# Patient Record
Sex: FEMALE | Race: WHITE | Employment: OTHER | URBAN - METROPOLITAN AREA
[De-identification: names, ages, dates, MRNs, and addresses within clinical notes are randomized per-mention and may not be internally consistent; named-entity substitution may affect disease eponyms.]

---

## 2018-06-12 ENCOUNTER — HOSPITAL ENCOUNTER (OUTPATIENT)
Age: 73
Discharge: HOME | End: 2018-06-12
Payer: MEDICARE

## 2018-06-12 LAB
ANION GAP SERPL CALCULATED.3IONS-SCNC: 12 MMOL/L (ref 7–16)
BUN BLDV-MCNC: 31 MG/DL (ref 8–23)
CALCIUM SERPL-MCNC: 9.6 MG/DL (ref 8.6–10.2)
CHLORIDE BLD-SCNC: 100 MMOL/L (ref 98–107)
CO2: 26 MMOL/L (ref 22–29)
CREAT SERPL-MCNC: 1.5 MG/DL (ref 0.5–1)
CREATININE URINE: 231 MG/DL (ref 29–226)
FERRITIN: 59 NG/ML
GFR AFRICAN AMERICAN: 41
GFR NON-AFRICAN AMERICAN: 34 ML/MIN/1.73
GLUCOSE BLD-MCNC: 206 MG/DL (ref 74–109)
HCT VFR BLD CALC: 40.1 % (ref 34–48)
HEMOGLOBIN: 13.1 G/DL (ref 11.5–15.5)
IMMATURE RETIC FRACT: 11.3 % (ref 3–15.9)
IRON SATURATION: 40 % (ref 15–50)
IRON: 159 MCG/DL (ref 37–145)
MAGNESIUM: 1.5 MG/DL (ref 1.6–2.6)
MCH RBC QN AUTO: 29.2 PG (ref 26–35)
MCHC RBC AUTO-ENTMCNC: 32.7 % (ref 32–34.5)
MCV RBC AUTO: 89.3 FL (ref 80–99.9)
MICROALBUMIN UR-MCNC: <12 MG/L
MICROALBUMIN/CREAT UR-RTO: ABNORMAL (ref 0–30)
PARATHYROID HORMONE INTACT: 14 PG/ML (ref 15–65)
PDW BLD-RTO: 12.4 FL (ref 11.5–15)
PHOSPHORUS: 3 MG/DL (ref 2.5–4.5)
PLATELET # BLD: 319 E9/L (ref 130–450)
PMV BLD AUTO: 10 FL (ref 7–12)
POTASSIUM SERPL-SCNC: 4.4 MMOL/L (ref 3.5–5)
RBC # BLD: 4.49 E12/L (ref 3.5–5.5)
RETIC HGB EQUIVALENT: 36.3 PG (ref 28.2–36.6)
RETICULOCYTE ABSOLUTE COUNT: 0.08 E12/L
RETICULOCYTE COUNT PCT: 1.8 % (ref 0.4–1.9)
SODIUM BLD-SCNC: 138 MMOL/L (ref 132–146)
TOTAL IRON BINDING CAPACITY: 394 MCG/DL (ref 250–450)
URIC ACID, SERUM: 6.2 MG/DL (ref 2.4–5.7)
VITAMIN D 25-HYDROXY: 41 NG/ML (ref 30–100)
WBC # BLD: 8.4 E9/L (ref 4.5–11.5)

## 2018-06-12 PROCEDURE — 82570 ASSAY OF URINE CREATININE: CPT

## 2018-06-12 PROCEDURE — 83970 ASSAY OF PARATHORMONE: CPT

## 2018-06-12 PROCEDURE — 85027 COMPLETE CBC AUTOMATED: CPT

## 2018-06-12 PROCEDURE — 80048 BASIC METABOLIC PNL TOTAL CA: CPT

## 2018-06-12 PROCEDURE — 82044 UR ALBUMIN SEMIQUANTITATIVE: CPT

## 2018-06-12 PROCEDURE — 82306 VITAMIN D 25 HYDROXY: CPT

## 2018-06-12 PROCEDURE — 83735 ASSAY OF MAGNESIUM: CPT

## 2018-06-12 PROCEDURE — 36415 COLL VENOUS BLD VENIPUNCTURE: CPT

## 2018-06-12 PROCEDURE — 83550 IRON BINDING TEST: CPT

## 2018-06-12 PROCEDURE — 85045 AUTOMATED RETICULOCYTE COUNT: CPT

## 2018-06-12 PROCEDURE — 84550 ASSAY OF BLOOD/URIC ACID: CPT

## 2018-06-12 PROCEDURE — 82728 ASSAY OF FERRITIN: CPT

## 2018-06-12 PROCEDURE — 84100 ASSAY OF PHOSPHORUS: CPT

## 2018-06-12 PROCEDURE — 83540 ASSAY OF IRON: CPT

## 2018-11-15 ENCOUNTER — HOSPITAL ENCOUNTER (OUTPATIENT)
Age: 73
Discharge: HOME | End: 2018-11-15
Payer: MEDICARE

## 2018-11-15 LAB
ANION GAP SERPL CALCULATED.3IONS-SCNC: 11 MMOL/L (ref 7–16)
BUN BLDV-MCNC: 26 MG/DL (ref 8–23)
CALCIUM SERPL-MCNC: 9.5 MG/DL (ref 8.6–10.2)
CHLORIDE BLD-SCNC: 103 MMOL/L (ref 98–107)
CO2: 27 MMOL/L (ref 22–29)
CREAT SERPL-MCNC: 1.5 MG/DL (ref 0.5–1)
CREATININE URINE: 105 MG/DL (ref 29–226)
FERRITIN: 85 NG/ML
GFR AFRICAN AMERICAN: 41
GFR NON-AFRICAN AMERICAN: 34 ML/MIN/1.73
GLUCOSE BLD-MCNC: 91 MG/DL (ref 74–99)
HCT VFR BLD CALC: 37.7 % (ref 34–48)
HEMOGLOBIN: 12.5 G/DL (ref 11.5–15.5)
IMMATURE RETIC FRACT: 6.8 % (ref 3–15.9)
IRON SATURATION: 30 % (ref 15–50)
IRON: 103 MCG/DL (ref 37–145)
MAGNESIUM: 1.7 MG/DL (ref 1.6–2.6)
MCH RBC QN AUTO: 29.8 PG (ref 26–35)
MCHC RBC AUTO-ENTMCNC: 33.2 % (ref 32–34.5)
MCV RBC AUTO: 90 FL (ref 80–99.9)
MICROALBUMIN UR-MCNC: 32.6 MG/L
MICROALBUMIN/CREAT UR-RTO: 31 (ref 0–30)
PARATHYROID HORMONE INTACT: 27 PG/ML (ref 15–65)
PDW BLD-RTO: 12.2 FL (ref 11.5–15)
PHOSPHORUS: 3 MG/DL (ref 2.5–4.5)
PLATELET # BLD: 319 E9/L (ref 130–450)
PMV BLD AUTO: 10.2 FL (ref 7–12)
POTASSIUM SERPL-SCNC: 4.6 MMOL/L (ref 3.5–5)
RBC # BLD: 4.19 E12/L (ref 3.5–5.5)
RETIC HGB EQUIVALENT: 35.2 PG (ref 28.2–36.6)
RETICULOCYTE ABSOLUTE COUNT: 0.08 E12/L
RETICULOCYTE COUNT PCT: 1.9 % (ref 0.4–1.9)
SODIUM BLD-SCNC: 141 MMOL/L (ref 132–146)
TOTAL IRON BINDING CAPACITY: 344 MCG/DL (ref 250–450)
URIC ACID, SERUM: 5.7 MG/DL (ref 2.4–5.7)
VITAMIN D 25-HYDROXY: 56 NG/ML (ref 30–100)
WBC # BLD: 8.3 E9/L (ref 4.5–11.5)

## 2018-11-15 PROCEDURE — 83970 ASSAY OF PARATHORMONE: CPT

## 2018-11-15 PROCEDURE — 83550 IRON BINDING TEST: CPT

## 2018-11-15 PROCEDURE — 83540 ASSAY OF IRON: CPT

## 2018-11-15 PROCEDURE — 84100 ASSAY OF PHOSPHORUS: CPT

## 2018-11-15 PROCEDURE — 83735 ASSAY OF MAGNESIUM: CPT

## 2018-11-15 PROCEDURE — 82044 UR ALBUMIN SEMIQUANTITATIVE: CPT

## 2018-11-15 PROCEDURE — 84550 ASSAY OF BLOOD/URIC ACID: CPT

## 2018-11-15 PROCEDURE — 82570 ASSAY OF URINE CREATININE: CPT

## 2018-11-15 PROCEDURE — 80048 BASIC METABOLIC PNL TOTAL CA: CPT

## 2018-11-15 PROCEDURE — 82306 VITAMIN D 25 HYDROXY: CPT

## 2018-11-15 PROCEDURE — 85027 COMPLETE CBC AUTOMATED: CPT

## 2018-11-15 PROCEDURE — 82728 ASSAY OF FERRITIN: CPT

## 2018-11-15 PROCEDURE — 36415 COLL VENOUS BLD VENIPUNCTURE: CPT

## 2018-11-15 PROCEDURE — 85045 AUTOMATED RETICULOCYTE COUNT: CPT

## 2019-06-05 ENCOUNTER — HOSPITAL ENCOUNTER (OUTPATIENT)
Dept: MAMMOGRAPHY | Age: 74
Discharge: HOME | End: 2019-06-07
Payer: MEDICARE

## 2019-06-05 DIAGNOSIS — Z12.39 BREAST CANCER SCREENING: ICD-10-CM

## 2019-06-05 PROCEDURE — 77063 BREAST TOMOSYNTHESIS BI: CPT

## 2019-06-13 ENCOUNTER — HOSPITAL ENCOUNTER (OUTPATIENT)
Age: 74
Discharge: HOME | End: 2019-06-13
Payer: MEDICARE

## 2019-06-13 LAB
ANION GAP SERPL CALCULATED.3IONS-SCNC: 13 MMOL/L (ref 7–16)
BACTERIA: ABNORMAL /HPF
BILIRUBIN URINE: NEGATIVE
BLOOD, URINE: NEGATIVE
BUN BLDV-MCNC: 30 MG/DL (ref 8–23)
CALCIUM SERPL-MCNC: 9.4 MG/DL (ref 8.6–10.2)
CHLORIDE BLD-SCNC: 104 MMOL/L (ref 98–107)
CLARITY: ABNORMAL
CO2: 25 MMOL/L (ref 22–29)
COLOR: YELLOW
CREAT SERPL-MCNC: 1.7 MG/DL (ref 0.5–1)
CREATININE URINE: 228 MG/DL (ref 29–226)
EPITHELIAL CELLS, UA: ABNORMAL /HPF
FERRITIN: 158 NG/ML
GFR AFRICAN AMERICAN: 36
GFR NON-AFRICAN AMERICAN: 30 ML/MIN/1.73
GLUCOSE BLD-MCNC: 111 MG/DL (ref 74–99)
GLUCOSE URINE: NEGATIVE MG/DL
HCT VFR BLD CALC: 39.8 % (ref 34–48)
HEMOGLOBIN: 12.8 G/DL (ref 11.5–15.5)
IMMATURE RETIC FRACT: 10.4 % (ref 3–15.9)
IRON SATURATION: 29 % (ref 15–50)
IRON: 108 MCG/DL (ref 37–145)
KETONES, URINE: NEGATIVE MG/DL
LEUKOCYTE ESTERASE, URINE: ABNORMAL
MAGNESIUM: 1.7 MG/DL (ref 1.6–2.6)
MCH RBC QN AUTO: 30 PG (ref 26–35)
MCHC RBC AUTO-ENTMCNC: 32.2 % (ref 32–34.5)
MCV RBC AUTO: 93.2 FL (ref 80–99.9)
MICROALBUMIN UR-MCNC: 78.3 MG/L
MICROALBUMIN/CREAT UR-RTO: 34.3 (ref 0–30)
NITRITE, URINE: POSITIVE
PARATHYROID HORMONE INTACT: 25 PG/ML (ref 15–65)
PDW BLD-RTO: 12.4 FL (ref 11.5–15)
PH UA: 5.5 (ref 5–9)
PHOSPHORUS: 3.8 MG/DL (ref 2.5–4.5)
PLATELET # BLD: 321 E9/L (ref 130–450)
PMV BLD AUTO: 10.6 FL (ref 7–12)
POTASSIUM SERPL-SCNC: 4.5 MMOL/L (ref 3.5–5)
PROTEIN UA: ABNORMAL MG/DL
RBC # BLD: 4.27 E12/L (ref 3.5–5.5)
RBC UA: ABNORMAL /HPF (ref 0–2)
RETIC HGB EQUIVALENT: 34.3 PG (ref 28.2–36.6)
RETICULOCYTE ABSOLUTE COUNT: 0.09 E12/L
RETICULOCYTE COUNT PCT: 2.2 % (ref 0.4–1.9)
SODIUM BLD-SCNC: 142 MMOL/L (ref 132–146)
SPECIFIC GRAVITY UA: >=1.03 (ref 1–1.03)
TOTAL IRON BINDING CAPACITY: 373 MCG/DL (ref 250–450)
URIC ACID, SERUM: 5.5 MG/DL (ref 2.4–5.7)
UROBILINOGEN, URINE: 0.2 E.U./DL
VITAMIN D 25-HYDROXY: 46 NG/ML (ref 30–100)
WBC # BLD: 9.2 E9/L (ref 4.5–11.5)
WBC UA: ABNORMAL /HPF (ref 0–5)

## 2019-06-13 PROCEDURE — 83540 ASSAY OF IRON: CPT

## 2019-06-13 PROCEDURE — 80048 BASIC METABOLIC PNL TOTAL CA: CPT

## 2019-06-13 PROCEDURE — 83735 ASSAY OF MAGNESIUM: CPT

## 2019-06-13 PROCEDURE — 85045 AUTOMATED RETICULOCYTE COUNT: CPT

## 2019-06-13 PROCEDURE — 82044 UR ALBUMIN SEMIQUANTITATIVE: CPT

## 2019-06-13 PROCEDURE — 83550 IRON BINDING TEST: CPT

## 2019-06-13 PROCEDURE — 85027 COMPLETE CBC AUTOMATED: CPT

## 2019-06-13 PROCEDURE — 82570 ASSAY OF URINE CREATININE: CPT

## 2019-06-13 PROCEDURE — 81001 URINALYSIS AUTO W/SCOPE: CPT

## 2019-06-13 PROCEDURE — 82306 VITAMIN D 25 HYDROXY: CPT

## 2019-06-13 PROCEDURE — 36415 COLL VENOUS BLD VENIPUNCTURE: CPT

## 2019-06-13 PROCEDURE — 84550 ASSAY OF BLOOD/URIC ACID: CPT

## 2019-06-13 PROCEDURE — 84100 ASSAY OF PHOSPHORUS: CPT

## 2019-06-13 PROCEDURE — 82728 ASSAY OF FERRITIN: CPT

## 2019-06-13 PROCEDURE — 83970 ASSAY OF PARATHORMONE: CPT

## 2019-06-19 ENCOUNTER — HOSPITAL ENCOUNTER (OUTPATIENT)
Dept: ULTRASOUND IMAGING | Age: 74
Discharge: HOME | End: 2019-06-19
Payer: MEDICARE

## 2019-06-19 DIAGNOSIS — N18.30 CHRONIC KIDNEY DISEASE, STAGE III (MODERATE) (HCC): ICD-10-CM

## 2019-06-19 PROCEDURE — 76775 US EXAM ABDO BACK WALL LIM: CPT

## 2019-11-01 ENCOUNTER — HOSPITAL ENCOUNTER (OUTPATIENT)
Age: 74
Discharge: HOME | End: 2019-11-01
Payer: MEDICARE

## 2019-11-01 LAB
ALBUMIN SERPL-MCNC: 4.6 G/DL (ref 3.5–5.2)
ALP BLD-CCNC: 47 U/L (ref 35–104)
ALT SERPL-CCNC: 22 U/L (ref 0–32)
ANION GAP SERPL CALCULATED.3IONS-SCNC: 10 MMOL/L (ref 7–16)
AST SERPL-CCNC: 24 U/L (ref 0–31)
BACTERIA: ABNORMAL /HPF
BASOPHILS ABSOLUTE: 0.13 E9/L (ref 0–0.2)
BASOPHILS RELATIVE PERCENT: 1.6 % (ref 0–2)
BILIRUB SERPL-MCNC: 0.4 MG/DL (ref 0–1.2)
BILIRUBIN URINE: NEGATIVE
BLOOD, URINE: NEGATIVE
BUN BLDV-MCNC: 28 MG/DL (ref 8–23)
CALCIUM SERPL-MCNC: 9.7 MG/DL (ref 8.6–10.2)
CHLORIDE BLD-SCNC: 104 MMOL/L (ref 98–107)
CHOLESTEROL, FASTING: 185 MG/DL (ref 0–199)
CLARITY: CLEAR
CO2: 28 MMOL/L (ref 22–29)
COLOR: YELLOW
CREAT SERPL-MCNC: 1.4 MG/DL (ref 0.5–1)
CREATININE URINE: 102 MG/DL (ref 29–226)
EOSINOPHILS ABSOLUTE: 0.28 E9/L (ref 0.05–0.5)
EOSINOPHILS RELATIVE PERCENT: 3.6 % (ref 0–6)
EPITHELIAL CELLS, UA: ABNORMAL /HPF
FERRITIN: 243 NG/ML
GFR AFRICAN AMERICAN: 45
GFR NON-AFRICAN AMERICAN: 37 ML/MIN/1.73
GLUCOSE FASTING: 133 MG/DL (ref 74–99)
GLUCOSE URINE: NEGATIVE MG/DL
HBA1C MFR BLD: 7.9 % (ref 4–5.6)
HCT VFR BLD CALC: 38.2 % (ref 34–48)
HCT VFR BLD CALC: 38.6 % (ref 34–48)
HDLC SERPL-MCNC: 33 MG/DL
HEMOGLOBIN: 12.3 G/DL (ref 11.5–15.5)
IMMATURE GRANULOCYTES #: 0.2 E9/L
IMMATURE GRANULOCYTES %: 2.5 % (ref 0–5)
IMMATURE RETIC FRACT: 11.7 % (ref 3–15.9)
IRON SATURATION: 29 % (ref 15–50)
IRON: 118 MCG/DL (ref 37–145)
KETONES, URINE: NEGATIVE MG/DL
LDL CHOLESTEROL CALCULATED: 97 MG/DL (ref 0–99)
LEUKOCYTE ESTERASE, URINE: ABNORMAL
LYMPHOCYTES ABSOLUTE: 1.62 E9/L (ref 1.5–4)
LYMPHOCYTES RELATIVE PERCENT: 20.6 % (ref 20–42)
MAGNESIUM: 1.7 MG/DL (ref 1.6–2.6)
MCH RBC QN AUTO: 31.1 PG (ref 26–35)
MCHC RBC AUTO-ENTMCNC: 32.2 % (ref 32–34.5)
MCV RBC AUTO: 96.7 FL (ref 80–99.9)
MICROALBUMIN UR-MCNC: 98.8 MG/L
MICROALBUMIN/CREAT UR-RTO: 96.9 (ref 0–30)
MONOCYTES ABSOLUTE: 0.57 E9/L (ref 0.1–0.95)
MONOCYTES RELATIVE PERCENT: 7.2 % (ref 2–12)
NEUTROPHILS ABSOLUTE: 5.08 E9/L (ref 1.8–7.3)
NEUTROPHILS RELATIVE PERCENT: 64.5 % (ref 43–80)
NITRITE, URINE: NEGATIVE
PARATHYROID HORMONE INTACT: 15 PG/ML (ref 15–65)
PDW BLD-RTO: 11.9 FL (ref 11.5–15)
PH UA: 5 (ref 5–9)
PHOSPHORUS: 2.5 MG/DL (ref 2.5–4.5)
PLATELET # BLD: 344 E9/L (ref 130–450)
PMV BLD AUTO: 10.3 FL (ref 7–12)
POTASSIUM SERPL-SCNC: 4.5 MMOL/L (ref 3.5–5)
PROTEIN UA: ABNORMAL MG/DL
RBC # BLD: 3.95 E12/L (ref 3.5–5.5)
RBC UA: ABNORMAL /HPF (ref 0–2)
RETIC HGB EQUIVALENT: 35 PG (ref 28.2–36.6)
RETICULOCYTE ABSOLUTE COUNT: 0.08 E12/L
RETICULOCYTE COUNT PCT: 2.1 % (ref 0.4–1.9)
SODIUM BLD-SCNC: 142 MMOL/L (ref 132–146)
SPECIFIC GRAVITY UA: 1.02 (ref 1–1.03)
T4 FREE: 1.11 NG/DL (ref 0.93–1.7)
TOTAL IRON BINDING CAPACITY: 406 MCG/DL (ref 250–450)
TOTAL PROTEIN: 7.8 G/DL (ref 6.4–8.3)
TRIGLYCERIDE, FASTING: 274 MG/DL (ref 0–149)
TSH SERPL DL<=0.05 MIU/L-ACNC: 1.99 UIU/ML (ref 0.27–4.2)
URIC ACID, SERUM: 5.4 MG/DL (ref 2.4–5.7)
UROBILINOGEN, URINE: 0.2 E.U./DL
VITAMIN D 25-HYDROXY: 47 NG/ML (ref 30–100)
VLDLC SERPL CALC-MCNC: 55 MG/DL
WBC # BLD: 7.9 E9/L (ref 4.5–11.5)
WBC UA: ABNORMAL /HPF (ref 0–5)

## 2019-11-01 PROCEDURE — 83550 IRON BINDING TEST: CPT

## 2019-11-01 PROCEDURE — 80053 COMPREHEN METABOLIC PANEL: CPT

## 2019-11-01 PROCEDURE — 36415 COLL VENOUS BLD VENIPUNCTURE: CPT

## 2019-11-01 PROCEDURE — 80061 LIPID PANEL: CPT

## 2019-11-01 PROCEDURE — 82306 VITAMIN D 25 HYDROXY: CPT

## 2019-11-01 PROCEDURE — 83970 ASSAY OF PARATHORMONE: CPT

## 2019-11-01 PROCEDURE — 84443 ASSAY THYROID STIM HORMONE: CPT

## 2019-11-01 PROCEDURE — 84550 ASSAY OF BLOOD/URIC ACID: CPT

## 2019-11-01 PROCEDURE — 85045 AUTOMATED RETICULOCYTE COUNT: CPT

## 2019-11-01 PROCEDURE — 84439 ASSAY OF FREE THYROXINE: CPT

## 2019-11-01 PROCEDURE — 81001 URINALYSIS AUTO W/SCOPE: CPT

## 2019-11-01 PROCEDURE — 83540 ASSAY OF IRON: CPT

## 2019-11-01 PROCEDURE — 83735 ASSAY OF MAGNESIUM: CPT

## 2019-11-01 PROCEDURE — 84100 ASSAY OF PHOSPHORUS: CPT

## 2019-11-01 PROCEDURE — 83036 HEMOGLOBIN GLYCOSYLATED A1C: CPT

## 2019-11-01 PROCEDURE — 82570 ASSAY OF URINE CREATININE: CPT

## 2019-11-01 PROCEDURE — 82728 ASSAY OF FERRITIN: CPT

## 2019-11-01 PROCEDURE — 85025 COMPLETE CBC W/AUTO DIFF WBC: CPT

## 2019-11-01 PROCEDURE — 82044 UR ALBUMIN SEMIQUANTITATIVE: CPT

## 2020-12-28 ENCOUNTER — HOSPITAL ENCOUNTER (OUTPATIENT)
Age: 75
Discharge: HOME | End: 2020-12-28
Payer: MEDICARE

## 2020-12-28 LAB
ALBUMIN SERPL-MCNC: 4.1 G/DL (ref 3.5–5.2)
ALP BLD-CCNC: 73 U/L (ref 35–104)
ALT SERPL-CCNC: 15 U/L (ref 0–32)
ANION GAP SERPL CALCULATED.3IONS-SCNC: 10 MMOL/L (ref 7–16)
AST SERPL-CCNC: 18 U/L (ref 0–31)
BACTERIA: ABNORMAL /HPF
BILIRUB SERPL-MCNC: 0.3 MG/DL (ref 0–1.2)
BILIRUBIN URINE: NEGATIVE
BLOOD, URINE: NEGATIVE
BUN BLDV-MCNC: 29 MG/DL (ref 8–23)
CALCIUM SERPL-MCNC: 9.2 MG/DL (ref 8.6–10.2)
CHLORIDE BLD-SCNC: 101 MMOL/L (ref 98–107)
CHOLESTEROL, FASTING: 221 MG/DL (ref 0–199)
CLARITY: CLEAR
CO2: 28 MMOL/L (ref 22–29)
COLOR: YELLOW
CREAT SERPL-MCNC: 1.4 MG/DL (ref 0.5–1)
CREATININE URINE: 205 MG/DL (ref 29–226)
EPITHELIAL CELLS, UA: ABNORMAL /HPF
FERRITIN: 75 NG/ML
GFR AFRICAN AMERICAN: 45
GFR NON-AFRICAN AMERICAN: 37 ML/MIN/1.73
GLUCOSE FASTING: 100 MG/DL (ref 74–99)
GLUCOSE URINE: NEGATIVE MG/DL
HBA1C MFR BLD: 6.9 % (ref 4–5.6)
HCT VFR BLD CALC: 38.4 % (ref 34–48)
HDLC SERPL-MCNC: 39 MG/DL
HEMOGLOBIN: 12.5 G/DL (ref 11.5–15.5)
HYALINE CASTS: ABNORMAL /LPF (ref 0–2)
IMMATURE RETIC FRACT: 15 % (ref 3–15.9)
IRON SATURATION: 27 % (ref 15–50)
IRON: 81 MCG/DL (ref 37–145)
KETONES, URINE: NEGATIVE MG/DL
LDL CHOLESTEROL CALCULATED: 129 MG/DL (ref 0–99)
LEUKOCYTE ESTERASE, URINE: NEGATIVE
MAGNESIUM: 1.6 MG/DL (ref 1.6–2.6)
MCH RBC QN AUTO: 29.3 PG (ref 26–35)
MCHC RBC AUTO-ENTMCNC: 32.6 % (ref 32–34.5)
MCV RBC AUTO: 89.9 FL (ref 80–99.9)
MICROALBUMIN UR-MCNC: 76.3 MG/L
MICROALBUMIN/CREAT UR-RTO: 37.2 (ref 0–30)
MUCUS: PRESENT /LPF
NITRITE, URINE: NEGATIVE
PARATHYROID HORMONE INTACT: 36 PG/ML (ref 15–65)
PDW BLD-RTO: 12.8 FL (ref 11.5–15)
PH UA: 5 (ref 5–9)
PHOSPHORUS: 4.1 MG/DL (ref 2.5–4.5)
PLATELET # BLD: 358 E9/L (ref 130–450)
PMV BLD AUTO: 9.9 FL (ref 7–12)
POTASSIUM SERPL-SCNC: 4.5 MMOL/L (ref 3.5–5)
PROTEIN UA: NORMAL MG/DL
RBC # BLD: 4.27 E12/L (ref 3.5–5.5)
RBC UA: ABNORMAL /HPF (ref 0–2)
RETIC HGB EQUIVALENT: 33.6 PG (ref 28.2–36.6)
RETICULOCYTE ABSOLUTE COUNT: 0.09 E12/L
RETICULOCYTE COUNT PCT: 2 % (ref 0.4–1.9)
SODIUM BLD-SCNC: 139 MMOL/L (ref 132–146)
SPECIFIC GRAVITY UA: 1.02 (ref 1–1.03)
T4 FREE: 0.96 NG/DL (ref 0.93–1.7)
TOTAL IRON BINDING CAPACITY: 299 MCG/DL (ref 250–450)
TOTAL PROTEIN: 7.9 G/DL (ref 6.4–8.3)
TRIGLYCERIDE, FASTING: 267 MG/DL (ref 0–149)
TSH SERPL DL<=0.05 MIU/L-ACNC: 1.57 UIU/ML (ref 0.27–4.2)
URIC ACID, SERUM: 8.3 MG/DL (ref 2.4–5.7)
UROBILINOGEN, URINE: 0.2 E.U./DL
VITAMIN D 25-HYDROXY: 52 NG/ML (ref 30–100)
VLDLC SERPL CALC-MCNC: 53 MG/DL
WBC # BLD: 9.5 E9/L (ref 4.5–11.5)
WBC UA: ABNORMAL /HPF (ref 0–5)

## 2020-12-28 PROCEDURE — 36415 COLL VENOUS BLD VENIPUNCTURE: CPT

## 2020-12-28 PROCEDURE — 82570 ASSAY OF URINE CREATININE: CPT

## 2020-12-28 PROCEDURE — 80061 LIPID PANEL: CPT

## 2020-12-28 PROCEDURE — 83540 ASSAY OF IRON: CPT

## 2020-12-28 PROCEDURE — 83550 IRON BINDING TEST: CPT

## 2020-12-28 PROCEDURE — 83036 HEMOGLOBIN GLYCOSYLATED A1C: CPT

## 2020-12-28 PROCEDURE — 81001 URINALYSIS AUTO W/SCOPE: CPT

## 2020-12-28 PROCEDURE — 85045 AUTOMATED RETICULOCYTE COUNT: CPT

## 2020-12-28 PROCEDURE — 84443 ASSAY THYROID STIM HORMONE: CPT

## 2020-12-28 PROCEDURE — 84550 ASSAY OF BLOOD/URIC ACID: CPT

## 2020-12-28 PROCEDURE — 83735 ASSAY OF MAGNESIUM: CPT

## 2020-12-28 PROCEDURE — 82044 UR ALBUMIN SEMIQUANTITATIVE: CPT

## 2020-12-28 PROCEDURE — 82728 ASSAY OF FERRITIN: CPT

## 2020-12-28 PROCEDURE — 80053 COMPREHEN METABOLIC PANEL: CPT

## 2020-12-28 PROCEDURE — 84439 ASSAY OF FREE THYROXINE: CPT

## 2020-12-28 PROCEDURE — 82306 VITAMIN D 25 HYDROXY: CPT

## 2020-12-28 PROCEDURE — 85027 COMPLETE CBC AUTOMATED: CPT

## 2020-12-28 PROCEDURE — 84100 ASSAY OF PHOSPHORUS: CPT

## 2020-12-28 PROCEDURE — 83970 ASSAY OF PARATHORMONE: CPT

## 2021-01-13 ENCOUNTER — HOSPITAL ENCOUNTER (OUTPATIENT)
Dept: MAMMOGRAPHY | Age: 76
Discharge: HOME | End: 2021-01-15
Payer: MEDICARE

## 2021-01-13 DIAGNOSIS — Z12.31 OTHER SCREENING MAMMOGRAM: ICD-10-CM

## 2021-01-13 PROCEDURE — 77063 BREAST TOMOSYNTHESIS BI: CPT

## 2022-12-21 ENCOUNTER — HOSPITAL ENCOUNTER (OUTPATIENT)
Dept: PHYSICAL THERAPY | Age: 77
Setting detail: THERAPIES SERIES
Discharge: HOME | End: 2022-12-21
Payer: MEDICARE

## 2022-12-21 PROCEDURE — 97161 PT EVAL LOW COMPLEX 20 MIN: CPT

## 2022-12-23 NOTE — PROGRESS NOTES
Physical Therapy  Initial Assessment  Date: 2022  Patient Name: Jaclyn White  MRN: [id number]  : 1945    Referring Physician: Brian Gray MD     PCP: Amisha Mcknight MD     Medical Diagnosis: Radiculopathy, lumbar region [M54.16] Back Pain  No data recorded    Insurance: Payor: Royx Lomeli / Plan: Kt Zuluaga / Product Type: *No Product type* /   Insurance ID: [id number] - (Medicare Managed)      Restrictions:       Subjective:   General  Chart Reviewed: Yes  Patient Assessed for Rehabilitation Services: Yes  Additional Pertinent Hx: Patient presents to PT to assess and treat issues of persistent back pain with limited mobility. Patient is s/p epidural injection per Dr Conner Dean  History obtained from[de-identified] Chart Review, Patient  Family/Caregiver Present: No  Diagnosis: Back Pain  Follows Commands: Within Functional Limits  PT Visit Information  Onset Date: 22  PT Insurance Information: [employer]  Subjective  Subjective: Persistent pain Dependent gait Limited functional mobility Difficulty with maintained and sustained postures Left LE radicular sympotms  Previous treatments prior to current episode?: Injections, Outpatient PT, Home PT, Surgery  Pain Screening  Patient Currently in Pain: Yes       Vision/Hearing:       Orientation:       Social History:       Functional Status:       Objective:                                                                   Outpatient Rehab Objectives (Peds):  Na    Neuro Screen (Peds): Not Assessed     Outcome Measure(s) Completed (Peds):    Not Assessed    Treatments Completed:  N/A - Evaluation Only    Assessment:              Plan:    Physcial Therapy Plan  Plan weeks: 5 weeks  Current Treatment Recommendations: Strengthening, ROM, Functional mobility training, Balance training, Endurance training, Home exercise program, Patient/Caregiver education & training, Aquatics    G-Code:       Balance and Gait:  Not Assessed    OutComes Score:                                                      AM-PAC Score:             Goals:  Short Term Goals  Time Frame for Short Term Goals: 2 weeks  Short Term Goal 1: patient will be able to engage in consistent active exercise while reporting no increase in back pain  STG Goal 1 Status[de-identified] New  Long Term Goals  Time Frame for Long Term Goals : 5 weeks  Long Term Goal 1: Patient will be able to engage in ADL's and ambulation while effectively controlling back pain at 4/10 or less  LTG Goal 1 Status[de-identified] New  Long Term Goal 2: Trunk ROM will be Fair or better  LTG Goal 2 Status[de-identified] New  Long Term Goal 3: Trunk strength 3+/5 or better  LTG Goal 3 Status[de-identified] New  Long Term Goal 4: Patient will be able to maintain and self direct an appropriate follow up independent exercise program  Patient Goals   Patient Goals : control back pain       Therapy Time:   Individual Concurrent Group Co-treatment   Time In           Time Out           Minutes                   Rafael Bae PT       Physical Therapy: Initial Evaluation    Patient: Olena Chambers (39 y.o. female)   Examination Date:   Plan of Care Certification Period: 2022 to        :  1945 ;    Confirmed: Yes MRN: [id number]  CSN: [id number]   Insurance: Payor: [address] [place] [address] / Plan: Jasper Gonzalez / Product Type: *No Product type* /   Insurance ID: [id number] - (Medicare Managed) Secondary Insurance (if applicable):    Referring Physician: Quang Villasenor MD     PCP: Iva Bazzi MD Visits to Date/Visits Approved:   /      No Show/Cancelled Appts:   /       Medical Diagnosis: Radiculopathy, lumbar region [M54.16] Back Pain  Treatment Diagnosis:       PERTINENT MEDICAL HISTORY   Patient Assessed for Rehabilitation Services: Yes       Medical History: Chart Reviewed: Yes   Past Medical History:   Diagnosis Date    Atrial fibrillation ([facility] [address]     CAD (coronary artery disease)     CHF (congestive heart failure) ([facility] [address]     after heart attack    Chronic back pain     GERD (gastroesophageal reflux disease)     Hx of blood clots 1997    thrombophlebitis leg    Hyperlipidemia     Hypertension     Irritable bowel syndrome     Myocardial infarction [facility]) 2004    Neuropathy     Obesity     Osteoarthritis     Renal insufficiency     chronic see's dr Powell December    Restless legs syndrome     Type II or unspecified type diabetes mellitus without mention of complication, not stated as uncontrolled      Surgical History:   Past Surgical History:   Procedure Laterality Date    CARPAL TUNNEL RELEASE Bilateral     CATARACT REMOVAL WITH IMPLANT Right 12 1 15    CATARACT REMOVAL WITH IMPLANT Left 5 10 16    COLONOSCOPY      CORONARY ANGIOPLASTY WITH STENT PLACEMENT  2004    2 stents    HYSTERECTOMY (CERVIX STATUS UNKNOWN)      vaginal    PARATHYROIDECTOMY      SPINE SURGERY      lumbar x5 rods & screws    TONSILLECTOMY      UPPER GASTROINTESTINAL ENDOSCOPY         Medications:   Current Outpatient Medications:     TRESIBA FLEXTOUCH 200 UNIT/ML SOPN, , Disp: , Rfl:     HUMALOG KWIKPEN 200 UNIT/ML SOPN pen, , Disp: , Rfl:     Insulin Lispro, Human, (HUMALOG SC), Inject into the skin three times daily Sliding scale, Disp: , Rfl:     aspirin 81 MG tablet, Take 81 mg by mouth nightly 4 tabs, Disp: , Rfl:     Liraglutide (VICTOZA SC), Inject 1.8 mg into the skin daily, Disp: , Rfl:     Insulin Detemir (LEVEMIR SC), Inject 64 Units into the skin nightly Sliding scale, Disp: , Rfl:     atorvastatin (LIPITOR) 40 MG tablet, Take 1 tablet by mouth nightly., Disp: 90 tablet, Rfl: 1    gabapentin (NEURONTIN) 300 MG capsule, Take 1 capsule by mouth 2 times daily. , Disp: 180 capsule, Rfl: 1    omeprazole (PRILOSEC) 20 MG capsule, Take 20 mg by mouth daily. , Disp: , Rfl:     Insulin Syringes, Disposable, U-100 0.5 ML MISC, Pt injects 4 x daily, Disp: 400 each, Rfl: 0    Insulin Syringes, Disposable, U-100 1 ML MISC, Pt inject 2 x daily, Disp: 200 each, Rfl: 0    Insulin Pen Needle (BD PEN NEEDLE LEXIE U/F) 32G X 4 MM MISC, Injects 1 x daily, Disp: 100 each, Rfl: 0    lisinopril (PRINIVIL;ZESTRIL) 40 MG tablet, Take 20 mg by mouth daily. , Disp: , Rfl:     isosorbide mononitrate (IMDUR) 30 MG CR tablet, Take 30 mg by mouth daily. , Disp: , Rfl:     metoprolol (TOPROL-XL) 100 MG XL tablet, Take 150 mg by mouth nightly , Disp: , Rfl:     Probiotic Product (ACIDOPHILUS PROBIOTIC) CAPS capsule, Take 1 capsule by mouth daily. , Disp: , Rfl:     fexofenadine (ALLEGRA) 180 MG tablet, Take 180 mg by mouth nightly., Disp: , Rfl:     Multiple Vitamins-Minerals (PRESERVISION AREDS 2) CAPS, Take 1 tablet by mouth nightly., Disp: , Rfl:     Cobalamine Combinations (VITAMIN B12-FOLIC ACID PO), Take 1 capsule by mouth daily. , Disp: , Rfl:     Omega-3 Fatty Acids (OMEGA-3 FISH OIL PO), Take 1 capsule by mouth 2 times daily , Disp: , Rfl:     Biotin 5000 MCG CAPS, Take 1 capsule by mouth daily. , Disp: , Rfl:     Cholecalciferol (VITAMIN D) 2000 UNITS CAPS capsule, Take 1 capsule by mouth daily. , Disp: , Rfl:     Coenzyme Q-10 200 MG CAPS, Take 1 capsule by mouth every morning., Disp: , Rfl:     Calcium Citrate-Vitamin D (CITRACAL + D PO), Take 1 tablet by mouth. 400mg/500iu, Disp: , Rfl:     HYDROcodone-acetaminophen (NORCO) 5-325 MG per tablet, Take 1 tablet by mouth every 6 hours as needed. , Disp: , Rfl:   Allergies: Doxycycline calcium, Sulfa antibiotics, Iodine, and Pcn [penicillins]      SUBJECTIVE EXAMINATION     History obtained from[de-identified] Chart Review, Patient,      Family/Caregiver Present: No    Subjective History: Onset Date: 11/09/22  Subjective: Persistent pain Dependent gait Limited functional mobility Difficulty with maintained and sustained postures Left LE radicular sympotms  Additional Pertinent Hx (if applicable): Patient presents to PT to assess and treat issues of persistent back pain with limited mobility.  Patient is s/p epidural injection per Dr Lucy Barillas   Previous treatments prior to current episode?: Injections, Outpatient PT, Home PT, Surgery      Learning/Language: Learning  Does the patient/guardian have any barriers to learning?: No barriers  Will there be a co-learner?: No  What is the preferred language of the patient/guardian?: English  Is an  required?: No  How does the patient/guardian prefer to learn new concepts?: Listening     Pain Screening    Pain Screening  Patient Currently in Pain: Yes    Functional Status         Social History:    Social History  Lives With: Alone  Type of Home: House  Home Layout: One level, Laundry in basement  Home Equipment: Eletha Bouquet, rolling, Cane, [employer], quad    Occupation/Interests:        Prior Level of Function:              Current Level of Function:                  OBJECTIVE EXAMINATION   Restrictions:              Review of Systems:  Vision: Within Functional Limits  Hearing: Within functional limits  Overall Orientation Status: Within Functional Limits  Patient affect[de-identified] Normal  Follows Commands: Within Functional Limits    VBI Screening / Lumbar Screening:         Regional Screen:         Observations:   General Observations  Posture:  Other (comment)  Cervical: Forward head posture, Right Lateral flexion/head tilt, Right cervical rotation  Thoracic Spine: Thoracic Hyperkyphosis, Trunk Flexed Posture  Shoulders: Rounded, Asymmetric  Spine / Pelvis: Decreased lumbar lordosis, Trunk Flexed Posture, Leg Length Discrepancy    Palpation:        Ambulation/Gait (if applicable):   Ambulation  Surface: Level tile  Device: Single point cane  Assistance: Modified Independent  Gait Deviations: Slow Arianne, Decreased step length, Decreased step height, Decreased head and trunk rotation  Stairs/Curb  Stairs?: No    Balance Screen:        Neuro Screen:   Not Assessed  Left AROM  Right AROM         AROM LLE (degrees)  L SLR: 30  L Hip Flexion (0-125): 90  L Hip Extension (0-10): 5    AROM RLE (degrees)  R SLR: 30  R Hip Flexion (0-125): 90  R Hip Extension (0-10): 5     Left PROM  Right PROM                    Left Strength  Right Strength         Strength LLE  L Hip Flexion: 3/5  L Hip Extension: 3-/5  L Hip ABduction: 3-/5    Strength RLE  R Hip Flexion: 3/5  R Hip Extension: 3-/5  R Hip ABduction: 3-/5     Cervical Assessment               Thoracic Assessment             Lumbar Assessment     AROM Lumbar Spine   Lumbar Spine AROM : Painful  Flexion: 60  Extension: 30       Trunk Strength     Trunk Strength  Trunk Flexion: 3-/5  Trunk Extension: 3-/5     Muscle Length/Flexibility:      Joint Mobility (if applicable):        Special Tests:        Balance/Gait Assessment(s) Performed:   Not Assessed    Additional Finding(s) (if applicable):    NA       ASSESSMENT     Impression:      Body Structures, Functions, Activity Limitations Requiring Skilled Therapeutic Intervention: Decreased functional mobility , Decreased ROM, Decreased strength, Decreased endurance, Decreased balance, Decreased posture, Increased pain    Statement of Medical Necessity: Physical Therapy is both indicated and medically necessary as outlined in the POC to increase the likelihood of meeting the functionally related goals stated below. Patient's Activity Tolerance:        Patient's rehabilitation potential/prognosis is considered to be:  Fair    Factors which may impact rehabilitation potential include: None        GOALS   Patient Goal(s):    Short Term Goals Completed by 2 weeks Goal Status   patient will be able to engage in consistent active exercise while reporting no increase in back pain New                               Long Term Goals Completed by 5 weeks Goal Status   Patient will be able to engage in ADL's and ambulation while effectively controlling back pain at 4/10 or less New   Trunk ROM will be Fair or better New   Trunk strength 3+/5 or better New   Patient will be able to maintain and self direct an appropriate follow up independent exercise program TREATMENT PLAN       Requires PT Follow-Up: Yes    Pt. actively involved in establishing Plan of Care and Goals: Yes  Patient/ Caregiver education and instruction:               Treatment may include any combination of the following: Strengthening, ROM, Functional mobility training, Balance training, Endurance training, Home exercise program, Patient/Caregiver education & training, Aquatics     Frequency / Duration:  Patient to be seen 2 times per week for 5 weeks weeks      Eval Complexity: Overall Evaluation : Low  Decision Making: Low Complexity  Clinical Presentation: Low    PT Treatment Completed:  N/A - Evaluation Only    Therapy Time  Individual Time In:         Individual Time Out:    Minutes: Therapist Signature: Harvey Lara, PT    Date: 62/76/2133     I certify that the above Therapy Services are being furnished while the patient is under my care. I agree with the treatment plan and certify that this therapy is necessary. Physician's Signature:  ___________________________   Date:_______                                                                   Hilary Chairez MD        Physician Comments: _______________________________________________    Please sign and return to [facility]. Please fax to the location listed below.  Sebastian Ryan for this referral!    [address] [facility]  [facility] [address]  Dept: [phone]  Fax: [phone]       POC NOTE

## 2022-12-23 NOTE — PLAN OF CARE
[address]  [facility]  [place] [address]  Dept: [address]: [phone]    PHYSICAL THERAPY PLAN OF CARE: INITIAL EVALUATION    Patient: Mel Rubinstein (19 y.o. female)   Examination Date:   Plan of Care Certification Period: 2022 to  23      :  1945  MRN: [id number]  CSN: [id number]   Insurance: Payor: Caitie Puente / Plan: Monico Gitelman / Product Type: *No Product type* /   Insurance ID: [id number] - (Medicare Managed) Secondary Insurance (if applicable):    Referring Physician: Ivelisse Yoon MD     PCP: Placido Jim MD Visits to Date/Visits Approved:   /      No Show/Cancelled Appts:   /       Medical Diagnosis: Radiculopathy, lumbar region [M54.16] Back Pain  No data recorded        SUBJECTIVE EXAMINATION      History obtained from[de-identified] Chart Review, Patient,      Family/Caregiver Present: No     Subjective History: Onset Date: 22  Subjective: Persistent pain Dependent gait Limited functional mobility Difficulty with maintained and sustained postures Left LE radicular sympotms  Additional Pertinent Hx (if applicable): Patient presents to PT to assess and treat issues of persistent back pain with limited mobility. Patient is s/p epidural injection per Dr Ernesto Banks   Previous treatments prior to current episode?: Injections, Outpatient PT, Home PT, Surgery     ASSESSMENT      Impression:       Body Structures, Functions, Activity Limitations Requiring Skilled Therapeutic Intervention: Decreased functional mobility , Decreased ROM, Decreased strength, Decreased endurance, Decreased balance, Decreased posture, Increased pain     Statement of Medical Necessity: Physical Therapy is both indicated and medically necessary as outlined in the POC to increase the likelihood of meeting the functionally related goals stated below.       Patient's Activity Tolerance:        Patient's rehabilitation potential/prognosis is considered to be: Fair     Factors which may impact rehabilitation potential include: None          Physcial Therapy Plan  Plan weeks: 5 weeks  Current Treatment Recommendations: Strengthening, ROM, Functional mobility training, Balance training, Endurance training, Home exercise program, Patient/Caregiver education & training, Aquatics    G-Code:       Balance and Gait:  Not Assessed    OutComes Score:                                                     AM-PAC Score:             Goals:  Short Term Goals  Time Frame for Short Term Goals: 2 weeks  Short Term Goal 1: patient will be able to engage in consistent active exercise while reporting no increase in back pain  STG Goal 1 Status[de-identified] New  Long Term Goals  Time Frame for Long Term Goals : 5 weeks  Long Term Goal 1: Patient will be able to engage in ADL's and ambulation while effectively controlling back pain at 4/10 or less  LTG Goal 1 Status[de-identified] New  Long Term Goal 2: Trunk ROM will be Fair or better  LTG Goal 2 Status[de-identified] New  Long Term Goal 3: Trunk strength 3+/5 or better  LTG Goal 3 Status[de-identified] New  Long Term Goal 4: Patient will be able to maintain and self direct an appropriate follow up independent exercise program  Patient Goals   Patient Goals : control back pain      Patient Status: [x] Continue / Initiate Plan of Care   Frequency / Duration:  Patient to be seen 2 times per week for 5 weeks weeks   Eval Complexity: Overall Evaluation : Low  Decision Making: Low Complexity  Clinical Presentation: Low     PT Treatment Completed:  N/A - Evaluation Only     Therapy Time  Individual Time In:         Individual Time Out:    Minutes: Therapist Signature: Delma Bowling PT    Date: 46/83/9024      I certify that the above Therapy Services are being furnished while the patient is under my care. I agree with the treatment plan and certify that this therapy is necessary.        [de-identified] Signature:  ___________________________   Date:_______ Sachin Boyer MD          Physician Comments: _______________________________________________     Please sign and return to [facility]. Please fax to the location listed below. Sebastian Ryan for this referral!     [address]  [facility]  [address] [address]  Dept: [phone]  Fax: [phone]         POC NOTE    Signature: Electronically signed by Radha Vega PT on 12/23/2022 at 12:18 PM.     If you have any questions or concerns, please don't hesitate to call.   Thank you for your referral!

## 2023-01-09 ENCOUNTER — HOSPITAL ENCOUNTER (OUTPATIENT)
Dept: PHYSICAL THERAPY | Age: 78
Setting detail: THERAPIES SERIES
Discharge: HOME | End: 2023-01-09
Payer: MEDICARE

## 2023-01-09 ENCOUNTER — HOSPITAL ENCOUNTER (OUTPATIENT)
Dept: PHYSICAL THERAPY | Age: 78
Setting detail: THERAPIES SERIES
End: 2023-01-09
Payer: MEDICARE

## 2023-01-09 PROCEDURE — 97113 AQUATIC THERAPY/EXERCISES: CPT

## 2023-01-11 ENCOUNTER — HOSPITAL ENCOUNTER (OUTPATIENT)
Dept: PHYSICAL THERAPY | Age: 78
Setting detail: THERAPIES SERIES
Discharge: HOME | End: 2023-01-11

## 2023-01-16 ENCOUNTER — HOSPITAL ENCOUNTER (OUTPATIENT)
Dept: PHYSICAL THERAPY | Age: 78
Setting detail: THERAPIES SERIES
Discharge: HOME | End: 2023-01-16
Payer: MEDICARE

## 2023-01-16 PROCEDURE — 97113 AQUATIC THERAPY/EXERCISES: CPT

## 2023-01-16 NOTE — PROGRESS NOTES
[facility]  Phone: [phone] Fax: [phone]        Physical Therapy Aquatic Flow Sheet   Date: 2023  Date:  2023    Patient Name:  Michele Walsh    :  1945  Restrictions/Precautions:    Diagnosis:   Radiculopathy, lumbar region [M54.16] Back Pain  Treatment Diagnosis:    Insurance/Certification information:  [employer] MEDICARE / Plan: Shital Distad / Product Type: *No Product type* /   Insurance ID: [id number] - (Medicare Managed)  Referring Physician:  William Thornton MD     PCP: Lisa Olivas MD  Plan of care signed (Y/N):    Visit# / total visits:  2/10 visits  Pain level: 8/10   Electronically signed by:  Sunitha German PTA  Time In: 6370  Time Out:1045    Subjective:  Patient presents to PT to assess and treat issues of persistent back pain with limited mobility.  Patient is s/p epidural injection per Dr Rodney Horvath   Previous treatments prior to current episode?: Injections, Outpatient PT, Home PT, Surgery         Key  B= Belt DB= Dumbells T= Theratube   H= Hydrotone N= Noodles W= Weights   P= Paddles S= Speedo equipment K= Kickboard     Exercises/Activities   Warm-up/Amb  Minutes  Exercises  Minutes    Slow forward   5  HR/TR  5    Slow sideways  5  Marches  5    Slow backwards  5  Mini-squats  5    Medium forward    Forward backward kick  5x2    Medium sideways    Hip abduction  5x2    Small shuffle    Hamstring curls      Jog    Knee extension      Braiding    Pelvic tilts      Bicycling  5  Scap squeezes          Shoulder flex/ext      Functional    Shoulder abd/add      Step    Shoulder H. abd/add      Lifting    Shoulder IR/ER      Hand to opp knee    Rowing      Push down squat    Bilateral pull down      UE PNF    Push/pull      LE PNF    Push downs      Wall push ups    Arm circles      SLS    Elbow flex/ext          Chin tuck      Stretching    UT shrugs/rolls      Gastroc/Soleus    Rocking horse      Hamstring  5        SKTC    Other Piriformis          Hip flexor          Ladder pull          Pec stretch          Post deltoid           Timed Code Treatment Minutes:  60    Total Treatment Minutes:  60    Treatment/Activity Tolerance:  [x] Patient tolerated treatment well [] Patient limited by fatique  [] Patient limited by pain [] Patient limited by other medical complications  [] Other:     Prognosis: [] Good [x] Fair  [] Poor    Patient Requires Follow-up: [x] Yes  [] No    Plan:   [x] Continue per plan of care [] Alter current plan (see comments)  [] Plan of care initiated [] Hold pending MD visit [] Discharge    Treatment Charges: Mins Units   Initial Evaluation     Re-Evaluation     Ther Exercise         TE     Aquatic Treatment 60 4   Ther Activities        TA     Gait Training          GT     Neuro Re-education NR     Modalities     Non-Billable Service Time     Other     Total Time/Units 60 4         Electronically signed by:  Jordyn Mcintyre PTA [id number]

## 2023-01-18 ENCOUNTER — HOSPITAL ENCOUNTER (OUTPATIENT)
Dept: PHYSICAL THERAPY | Age: 78
Setting detail: THERAPIES SERIES
Discharge: HOME | End: 2023-01-18
Payer: MEDICARE

## 2023-01-18 PROCEDURE — 97113 AQUATIC THERAPY/EXERCISES: CPT

## 2023-01-18 NOTE — PROGRESS NOTES
[facility]  Phone: [phone] Fax: [phone]        Physical Therapy Aquatic Flow Sheet   Date: 2023  Date:  2023    Patient Name:  Anuja Mckenzie    :  1945  Restrictions/Precautions:    Diagnosis:   Radiculopathy, lumbar region [M54.16] Back Pain  Treatment Diagnosis:    Insurance/Certification information:  HUMANA MEDICARE / Plan: Holley Clipper / Product Type: *No Product type* /   Insurance ID: [id number] - (Medicare Managed)  Referring Physician:  Isaac Thomson MD     PCP: Eleazar Ortiz MD  Plan of care signed (Y/N):    Visit# / total visits:  4/10 visits  Pain level: 8/10   Electronically signed by:  Johnathan Paulino PTA  Time In: 945  Time Out:      Key  B= Belt DB= Dumbells T= Theratube   H= Hydrotone N= Noodles W= Weights   P= Paddles S= Speedo equipment K= Kickboard     Exercises/Activities   Warm-up/Amb  Minutes  Exercises  Minutes    Slow forward   5  HR/TR  5    Slow sideways  5  Marches  5    Slow backwards  5  Mini-squats  5    Medium forward    Forward backward kick  5x2    Medium sideways    Hip abduction  5x2    Small shuffle    Hamstring curls      Jog    Knee extension      Braiding    Pelvic tilts      Bicycling  5  Scap squeezes          Shoulder flex/ext      Functional    Shoulder abd/add      Step    Shoulder H. abd/add      Lifting    Shoulder IR/ER      Hand to opp knee    Rowing      Push down squat    Bilateral pull down      UE PNF    Push/pull      LE PNF    Push downs      Wall push ups    Arm circles      SLS    Elbow flex/ext          Chin tuck      Stretching    UT shrugs/rolls      Gastroc/Soleus    Rocking horse      Hamstring  5        SKTC    Other      Piriformis          Hip flexor          Ladder pull          Pec stretch          Post deltoid           Timed Code Treatment Minutes:  60    Total Treatment Minutes:  60    Treatment/Activity Tolerance:  [x] Patient tolerated treatment well [] Patient limited by fatique  [] Patient limited by pain [] Patient limited by other medical complications  [] Other:     Prognosis: [] Good [x] Fair  [] Poor    Patient Requires Follow-up: [x] Yes  [] No    Plan:   [x] Continue per plan of care [] Alter current plan (see comments)  [] Plan of care initiated [] Hold pending MD visit [] Discharge    Treatment Charges: Mins Units   Initial Evaluation     Re-Evaluation     Ther Exercise         TE     Aquatic Treatment 60 4   Ther Activities        TA     Gait Training          GT     Neuro Re-education NR     Modalities     Non-Billable Service Time     Other     Total Time/Units 60 4         Electronically signed by:  Freddy Bolanos PTA [id number]

## 2023-01-21 NOTE — PROGRESS NOTES
[facility]  Phone: [phone] Fax: [phone]        Physical Therapy Aquatic Flow Sheet   Date: 2023  Date:  2023    Patient Name:  Trinity Martin    :  1945  Restrictions/Precautions:    Diagnosis:   Radiculopathy, lumbar region [M54.16] Back Pain  Treatment Diagnosis:    Insurance/Certification information:  HUMANA MEDICARE / Plan: Ada Robb / Product Type: *No Product type* /   Insurance ID: [id number] - (Medicare Managed)  Referring Physician:  Pancho Briceno MD     PCP: Mirella Madison MD  Plan of care signed (Y/N):    Visit# / total visits:  5/10 visits  Pain level: 8/10   Electronically signed by:  Flori Sweet PTA  Time In: 1030  Time Out:1130      Key  B= Belt DB= Dumbells T= Theratube   H= Hydrotone N= Noodles W= Weights   P= Paddles S= Speedo equipment K= Kickboard     Exercises/Activities   Warm-up/Amb  Minutes  Exercises  Minutes    Slow forward   5  HR/TR  5    Slow sideways  5  Marches  5    Slow backwards  5  Mini-squats  5    Medium forward    Forward backward kick  5x2    Medium sideways    Hip abduction  5x2    Small shuffle    Hamstring curls      Jog    Knee extension      Braiding    Pelvic tilts      Bicycling  5  Scap squeezes          Shoulder flex/ext      Functional    Shoulder abd/add      Step    Shoulder H. abd/add      Lifting    Shoulder IR/ER      Hand to opp knee    Rowing      Push down squat    Bilateral pull down      UE PNF    Push/pull      LE PNF    Push downs      Wall push ups    Arm circles      SLS    Elbow flex/ext          Chin tuck      Stretching    UT shrugs/rolls      Gastroc/Soleus    Rocking horse      Hamstring  5        SKTC    Other      Piriformis          Hip flexor          Ladder pull          Pec stretch          Post deltoid           Timed Code Treatment Minutes:  60    Total Treatment Minutes:  60    Treatment/Activity Tolerance:  [x] Patient tolerated treatment well [] Patient limited by fatique  [] Patient limited by pain [] Patient limited by other medical complications  [] Other:     Prognosis: [] Good [x] Fair  [] Poor    Patient Requires Follow-up: [x] Yes  [] No    Plan:   [x] Continue per plan of care [] Alter current plan (see comments)  [] Plan of care initiated [] Hold pending MD visit [] Discharge    Treatment Charges: Mins Units   Initial Evaluation     Re-Evaluation     Ther Exercise         TE     Aquatic Treatment 60 4   Ther Activities        TA     Gait Training          GT     Neuro Re-education NR     Modalities     Non-Billable Service Time     Other     Total Time/Units 60 4         Electronically signed by:  Jacki Ba PTA [id number]

## 2023-01-23 ENCOUNTER — HOSPITAL ENCOUNTER (OUTPATIENT)
Dept: PHYSICAL THERAPY | Age: 78
Setting detail: THERAPIES SERIES
Discharge: HOME | End: 2023-01-23
Payer: MEDICARE

## 2023-01-23 PROCEDURE — 97113 AQUATIC THERAPY/EXERCISES: CPT

## 2023-01-25 ENCOUNTER — HOSPITAL ENCOUNTER (OUTPATIENT)
Dept: PHYSICAL THERAPY | Age: 78
Setting detail: THERAPIES SERIES
End: 2023-01-25
Payer: MEDICARE

## 2023-01-25 NOTE — PROGRESS NOTES
[facility]  Phone: [phone] Fax: [phone]        Physical Therapy Aquatic Flow Sheet   Date: 2023  Date:  2023    Patient Name:  Priti Camilo    :  1945  Restrictions/Precautions:    Diagnosis:   Radiculopathy, lumbar region [M54.16] Back Pain  Treatment Diagnosis:    Insurance/Certification information:  HUMANA MEDICARE / Plan: Racquel Chandlerin / Product Type: *No Product type* /   Insurance ID: [id number] - (Medicare Managed)  Referring Physician:  Lesley Ramírez MD     PCP: Eddi Russ MD  Plan of care signed (Y/N):    Visit# / total visits:  6/10 visits  Pain level: 8/10   Electronically signed by:  Jos Ansari PTA  Time In: 1030  Time Out:1130      Key  B= Belt DB= Dumbells T= Theratube   H= Hydrotone N= Noodles W= Weights   P= Paddles S= Speedo equipment K= Kickboard     Exercises/Activities   Warm-up/Amb  Minutes  Exercises  Minutes    Slow forward   5  HR/TR  5    Slow sideways  5  Marches  5    Slow backwards  5  Mini-squats  5    Medium forward    Forward backward kick  5x2    Medium sideways    Hip abduction  5x2    Small shuffle    Hamstring curls      Jog    Knee extension      Braiding    Pelvic tilts      Bicycling  5  Scap squeezes          Shoulder flex/ext      Functional    Shoulder abd/add      Step    Shoulder H. abd/add      Lifting    Shoulder IR/ER      Hand to opp knee    Rowing      Push down squat    Bilateral pull down      UE PNF    Push/pull      LE PNF    Push downs      Wall push ups    Arm circles      SLS    Elbow flex/ext          Chin tuck      Stretching    UT shrugs/rolls      Gastroc/Soleus    Rocking horse      Hamstring  5        SKTC    Other      Piriformis          Hip flexor          Ladder pull          Pec stretch          Post deltoid           Timed Code Treatment Minutes:  60    Total Treatment Minutes:  60    Treatment/Activity Tolerance:  [x] Patient tolerated treatment well [] Patient limited by fatique  [] Patient limited by pain [] Patient limited by other medical complications  [x] Other:Time Frame for Short Term Goals: 2 weeks  Short Term Goal 1: patient will be able to engage in consistent active exercise while reporting no increase in back pain  STG Goal 1 Status[de-identified] New  Long Term Goals  Time Frame for Long Term Goals : 5 weeks  Long Term Goal 1: Patient will be able to engage in ADL's and ambulation while effectively controlling back pain at 4/10 or less  LTG Goal 1 Status[de-identified] New  Long Term Goal 2: Trunk ROM will be Fair or better  LTG Goal 2 Status[de-identified] New  Long Term Goal 3: Trunk strength 3+/5 or better  LTG Goal 3 Status[de-identified] New  Long Term Goal 4: Patient will be able to maintain and self direct an appropriate follow up independent exercise program  Patient Goals   Patient Goals : control back pain     Prognosis: [] Good [x] Fair  [] Poor    Patient Requires Follow-up: [x] Yes  [] No    Plan:   [x] Continue per plan of care [] Alter current plan (see comments)  [] Plan of care initiated [] Hold pending MD visit [] Discharge    Treatment Charges: Mins Units   Initial Evaluation     Re-Evaluation     Ther Exercise         TE     Aquatic Treatment 60 4   Ther Activities        TA     Gait Training          GT     Neuro Re-education NR     Modalities     Non-Billable Service Time     Other     Total Time/Units 60 4         Electronically signed by:  Claudia Dooley PTA [id number]

## 2023-01-30 ENCOUNTER — APPOINTMENT (OUTPATIENT)
Dept: PHYSICAL THERAPY | Age: 78
End: 2023-01-30
Payer: MEDICARE

## 2023-02-01 ENCOUNTER — APPOINTMENT (OUTPATIENT)
Dept: PHYSICAL THERAPY | Age: 78
End: 2023-02-01
Payer: MEDICARE

## 2023-02-13 ENCOUNTER — HOSPITAL ENCOUNTER (OUTPATIENT)
Dept: PHYSICAL THERAPY | Age: 78
Setting detail: THERAPIES SERIES
Discharge: HOME | End: 2023-02-13
Payer: MEDICARE

## 2023-02-13 PROCEDURE — 97113 AQUATIC THERAPY/EXERCISES: CPT

## 2023-02-13 NOTE — PROGRESS NOTES
[facility]  Phone: [phone] Fax: [phone]        Physical Therapy Aquatic Flow Sheet     Date:  2023    Patient Name:  Juan Cox    :  1945  Restrictions/Precautions:    Diagnosis:   Radiculopathy, lumbar region [M54.16] Back Pain  Treatment Diagnosis:    Insurance/Certification information:  HUMANA MEDICARE / Plan: Moody Minus / Product Type: *No Product type* /   Insurance ID: [id number] - (Medicare Managed)  Referring Physician:  Micaela Lutz MD     PCP: Vasiliy Peralta MD  Plan of care signed (Y/N):    Visit# / total visits:  7/10 visits  Pain level: 8/10   Electronically signed by:  Lucinda King PTA  Time In: 945  Time Out:      Key  B= Belt DB= Dumbells T= Theratube   H= Hydrotone N= Noodles W= Weights   P= Paddles S= Speedo equipment K= Kickboard     Exercises/Activities   Warm-up/Amb  Minutes  Exercises  Minutes    Slow forward   5  HR/TR  5    Slow sideways  5  Marches  5    Slow backwards  5  Mini-squats  5    Medium forward    Forward backward kick  5x2    Medium sideways    Hip abduction  5x2    Small shuffle    Hamstring curls      Jog    Knee extension      Braiding    Pelvic tilts      Bicycling  5  Scap squeezes          Shoulder flex/ext      Functional    Shoulder abd/add      Step    Shoulder H. abd/add      Lifting    Shoulder IR/ER      Hand to opp knee    Rowing      Push down squat    Bilateral pull down      UE PNF    Push/pull      LE PNF    Push downs      Wall push ups    Arm circles      SLS    Elbow flex/ext          Chin tuck      Stretching    UT shrugs/rolls      Gastroc/Soleus    Rocking horse      Hamstring  5        SKTC    Other      Piriformis          Hip flexor          Ladder pull          Pec stretch          Post deltoid           Timed Code Treatment Minutes:  60    Total Treatment Minutes:  60    Treatment/Activity Tolerance:  [x] Patient tolerated treatment well [] Patient limited by shanon  [] Patient limited by pain [] Patient limited by other medical complications  [x] Other:Time Frame for Short Term Goals: 2 weeks  Short Term Goal 1: patient will be able to engage in consistent active exercise while reporting no increase in back pain  STG Goal 1 Status[de-identified] New  Long Term Goals  Time Frame for Long Term Goals : 5 weeks  Long Term Goal 1: Patient will be able to engage in ADL's and ambulation while effectively controlling back pain at 4/10 or less  LTG Goal 1 Status[de-identified] New  Long Term Goal 2: Trunk ROM will be Fair or better  LTG Goal 2 Status[de-identified] New  Long Term Goal 3: Trunk strength 3+/5 or better  LTG Goal 3 Status[de-identified] New  Long Term Goal 4: Patient will be able to maintain and self direct an appropriate follow up independent exercise program  Patient Goals   Patient Goals : control back pain     Prognosis: [] Good [x] Fair  [] Poor    Patient Requires Follow-up: [x] Yes  [] No    Plan:   [x] Continue per plan of care [] Alter current plan (see comments)  [] Plan of care initiated [] Hold pending MD visit [] Discharge    Treatment Charges: Mins Units   Initial Evaluation     Re-Evaluation     Ther Exercise         TE     Aquatic Treatment 60 4   Ther Activities        TA     Gait Training          GT     Neuro Re-education NR     Modalities     Non-Billable Service Time     Other     Total Time/Units 60 4         Electronically signed by:  Bam Hutchins PTA [id number]

## 2023-02-15 ENCOUNTER — HOSPITAL ENCOUNTER (OUTPATIENT)
Dept: PHYSICAL THERAPY | Age: 78
Setting detail: THERAPIES SERIES
End: 2023-02-15
Payer: MEDICARE

## 2023-02-20 ENCOUNTER — HOSPITAL ENCOUNTER (OUTPATIENT)
Dept: PHYSICAL THERAPY | Age: 78
Setting detail: THERAPIES SERIES
Discharge: HOME | End: 2023-02-20
Payer: MEDICARE

## 2023-02-20 PROCEDURE — 97113 AQUATIC THERAPY/EXERCISES: CPT

## 2023-02-20 NOTE — PROGRESS NOTES
[facility]  Phone: [phone] Fax: [phone]        Physical Therapy Aquatic Flow Sheet     Date:  2023    Patient Name:  Evin Lewis    :  1945  Restrictions/Precautions:    Diagnosis:   Radiculopathy, lumbar region [M54.16] Back Pain  Treatment Diagnosis:    Insurance/Certification information:  HUMANA MEDICARE / Plan: Laban Latus / Product Type: *No Product type* /   Insurance ID: [id number] - (Medicare Managed)  Referring Physician:  Shawna Henriquez MD     PCP: Florence Erwin MD  Plan of care signed (Y/N):    Visit# / total visits:  8/10 visits  Pain level: 8/10   Electronically signed by:  Xiomara Arzate PTA  Time In: 945  Time XTL:8258      Key  B= Belt DB= Dumbells T= Theratube   H= Hydrotone N= Noodles W= Weights   P= Paddles S= Speedo equipment K= Kickboard     Exercises/Activities   Warm-up/Amb  Minutes  Exercises  Minutes    Slow forward   5  HR/TR  5    Slow sideways  5  Marches  5    Slow backwards  5  Mini-squats  5    Medium forward    Forward backward kick  5x2    Medium sideways    Hip abduction  5x2    Small shuffle    Hamstring curls      Jog    Knee extension      Braiding    Pelvic tilts      Bicycling  5  Scap squeezes          Shoulder flex/ext      Functional    Shoulder abd/add      Step    Shoulder H. abd/add      Lifting    Shoulder IR/ER      Hand to opp knee    Rowing      Push down squat    Bilateral pull down      UE PNF    Push/pull      LE PNF    Push downs      Wall push ups    Arm circles      SLS    Elbow flex/ext          Chin tuck      Stretching    UT shrugs/rolls      Gastroc/Soleus    Rocking horse      Hamstring  5        SKTC    Other      Piriformis          Hip flexor          Ladder pull          Pec stretch          Post deltoid           Timed Code Treatment Minutes:  60    Total Treatment Minutes:  60    Treatment/Activity Tolerance:  [x] Patient tolerated treatment well [] Patient limited by shanon  [] Patient limited by pain [] Patient limited by other medical complications  [x] Other:Time Frame for Short Term Goals: 2 weeks  Short Term Goal 1: patient will be able to engage in consistent active exercise while reporting no increase in back pain  STG Goal 1 Status[de-identified] New  Long Term Goals  Time Frame for Long Term Goals : 5 weeks  Long Term Goal 1: Patient will be able to engage in ADL's and ambulation while effectively controlling back pain at 4/10 or less  LTG Goal 1 Status[de-identified] New  Long Term Goal 2: Trunk ROM will be Fair or better  LTG Goal 2 Status[de-identified] New  Long Term Goal 3: Trunk strength 3+/5 or better  LTG Goal 3 Status[de-identified] New  Long Term Goal 4: Patient will be able to maintain and self direct an appropriate follow up independent exercise program  Patient Goals   Patient Goals : control back pain     Prognosis: [] Good [x] Fair  [] Poor    Patient Requires Follow-up: [x] Yes  [] No    Plan:   [x] Continue per plan of care [] Alter current plan (see comments)  [] Plan of care initiated [] Hold pending MD visit [] Discharge    Treatment Charges: Mins Units   Initial Evaluation     Re-Evaluation     Ther Exercise         TE     Aquatic Treatment 60 4   Ther Activities        TA     Gait Training          GT     Neuro Re-education NR     Modalities     Non-Billable Service Time     Other     Total Time/Units 60 4         Electronically signed by:  Saintclair Chandler PTA [id number]

## 2023-02-22 ENCOUNTER — HOSPITAL ENCOUNTER (OUTPATIENT)
Dept: PHYSICAL THERAPY | Age: 78
Setting detail: THERAPIES SERIES
Discharge: HOME | End: 2023-02-22
Payer: MEDICARE

## 2023-02-27 ENCOUNTER — HOSPITAL ENCOUNTER (OUTPATIENT)
Dept: PHYSICAL THERAPY | Age: 78
Setting detail: THERAPIES SERIES
Discharge: HOME | End: 2023-02-27
Payer: MEDICARE

## 2023-02-27 PROCEDURE — 97113 AQUATIC THERAPY/EXERCISES: CPT

## 2023-02-27 NOTE — PROGRESS NOTES
[facility]  Phone: [phone] Fax: [phone]        Physical Therapy Aquatic Flow Sheet     Date:  2023    Patient Name:  Dusty Mcnulty    :  1945  Restrictions/Precautions:    Diagnosis:   Radiculopathy, lumbar region [M54.16] Back Pain  Treatment Diagnosis:    Insurance/Certification information:  HUMANA MEDICARE / Plan: Ludin Shila / Product Type: *No Product type* /   Insurance ID: [id number] - (Medicare Managed)  Referring Physician:  Lorraine Gonzalez MD     PCP: Sheila Gutierrez MD  Plan of care signed (Y/N):    Visit# / total visits:its  Pain level: 8/10   Electronically signed by:  Sergei Yepez PTA  Time In: 945  Time SEK:5289      Key  B= Belt DB= Dumbells T= Theratube   H= Hydrotone N= Noodles W= Weights   P= Paddles S= Speedo equipment K= Kickboard     Exercises/Activities   Warm-up/Amb  Minutes  Exercises  Minutes    Slow forward   5  HR/TR  5    Slow sideways  5  Marches  5    Slow backwards  5  Mini-squats  5    Medium forward    Forward backward kick  5x2    Medium sideways    Hip abduction  5x2    Small shuffle    Hamstring curls      Jog    Knee extension      Braiding    Pelvic tilts      Bicycling  5  Scap squeezes          Shoulder flex/ext      Functional    Shoulder abd/add      Step    Shoulder H. abd/add      Lifting    Shoulder IR/ER      Hand to opp knee    Rowing      Push down squat    Bilateral pull down      UE PNF    Push/pull      LE PNF    Push downs      Wall push ups    Arm circles      SLS    Elbow flex/ext          Chin tuck      Stretching    UT shrugs/rolls      Gastroc/Soleus    Rocking horse      Hamstring  5        SKTC    Other      Piriformis          Hip flexor          Ladder pull          Pec stretch          Post deltoid           Timed Code Treatment Minutes:  60    Total Treatment Minutes:  60    Treatment/Activity Tolerance:  [x] Patient tolerated treatment well [] Patient limited by shanon  [] Patient limited by pain [] Patient limited by other medical complications  [x] Other:Time Frame for Short Term Goals: 2 weeks  Short Term Goal 1: patient will be able to engage in consistent active exercise while reporting no increase in back pain  STG Goal 1 Status[de-identified] New  Long Term Goals  Time Frame for Long Term Goals : 5 weeks  Long Term Goal 1: Patient will be able to engage in ADL's and ambulation while effectively controlling back pain at 4/10 or less  LTG Goal 1 Status[de-identified] New  Long Term Goal 2: Trunk ROM will be Fair or better  LTG Goal 2 Status[de-identified] New  Long Term Goal 3: Trunk strength 3+/5 or better  LTG Goal 3 Status[de-identified] New  Long Term Goal 4: Patient will be able to maintain and self direct an appropriate follow up independent exercise program  Patient Goals   Patient Goals : control back pain     Prognosis: [] Good [x] Fair  [] Poor    Patient Requires Follow-up: [x] Yes  [] No    Plan:   [x] Continue per plan of care [] Alter current plan (see comments)  [] Plan of care initiated [] Hold pending MD visit [] Discharge    Treatment Charges: Mins Units   Initial Evaluation     Re-Evaluation     Ther Exercise         TE     Aquatic Treatment 60 4   Ther Activities        TA     Gait Training          GT     Neuro Re-education NR     Modalities     Non-Billable Service Time     Other     Total Time/Units 60 4         Electronically signed by:  Ailyn Randle PTA [id number]

## 2023-09-28 ENCOUNTER — HOSPITAL ENCOUNTER (EMERGENCY)
Age: 78
Discharge: HOME | End: 2023-09-28
Attending: STUDENT IN AN ORGANIZED HEALTH CARE EDUCATION/TRAINING PROGRAM
Payer: MEDICARE

## 2023-09-28 ENCOUNTER — APPOINTMENT (OUTPATIENT)
Dept: MRI IMAGING | Age: 78
End: 2023-09-28
Payer: MEDICARE

## 2023-09-28 DIAGNOSIS — M54.40 ACUTE LEFT-SIDED LOW BACK PAIN WITH SCIATICA, SCIATICA LATERALITY UNSPECIFIED: ICD-10-CM

## 2023-09-28 DIAGNOSIS — M48.00 SPINAL STENOSIS, UNSPECIFIED SPINAL REGION: Primary | ICD-10-CM

## 2023-09-28 LAB
ALBUMIN SERPL-MCNC: 4.1 G/DL (ref 3.5–5.2)
ALP SERPL-CCNC: 80 U/L (ref 35–104)
ALT SERPL-CCNC: 13 U/L (ref 0–32)
ANION GAP SERPL CALCULATED.3IONS-SCNC: 9 MMOL/L (ref 7–16)
AST SERPL-CCNC: 19 U/L (ref 0–31)
BASOPHILS # BLD: 0.13 K/UL (ref 0–0.2)
BASOPHILS NFR BLD: 2 % (ref 0–2)
BILIRUB SERPL-MCNC: 0.4 MG/DL (ref 0–1.2)
BUN SERPL-MCNC: 23 MG/DL (ref 6–23)
CALCIUM SERPL-MCNC: 9.2 MG/DL (ref 8.6–10.2)
CHLORIDE SERPL-SCNC: 106 MMOL/L (ref 98–107)
CO2 SERPL-SCNC: 27 MMOL/L (ref 22–29)
CREAT SERPL-MCNC: 1.1 MG/DL (ref 0.5–1)
EOSINOPHIL # BLD: 0.38 K/UL (ref 0.05–0.5)
EOSINOPHILS RELATIVE PERCENT: 4 % (ref 0–6)
ERYTHROCYTE [DISTWIDTH] IN BLOOD BY AUTOMATED COUNT: 12.7 % (ref 11.5–15)
GFR SERPL CREATININE-BSD FRML MDRD: 52 ML/MIN/1.73M2
GLUCOSE SERPL-MCNC: 119 MG/DL (ref 74–99)
HCT VFR BLD AUTO: 38.5 % (ref 34–48)
HGB BLD-MCNC: 12.4 G/DL (ref 11.5–15.5)
IMM GRANULOCYTES # BLD AUTO: 0.06 K/UL (ref 0–0.58)
IMM GRANULOCYTES NFR BLD: 1 % (ref 0–5)
LYMPHOCYTES NFR BLD: 1.71 K/UL (ref 1.5–4)
LYMPHOCYTES RELATIVE PERCENT: 20 % (ref 20–42)
MCH RBC QN AUTO: 29.4 PG (ref 26–35)
MCHC RBC AUTO-ENTMCNC: 32.2 G/DL (ref 32–34.5)
MCV RBC AUTO: 91.2 FL (ref 80–99.9)
MONOCYTES NFR BLD: 0.65 K/UL (ref 0.1–0.95)
MONOCYTES NFR BLD: 8 % (ref 2–12)
NEUTROPHILS NFR BLD: 66 % (ref 43–80)
NEUTS SEG NFR BLD: 5.68 K/UL (ref 1.8–7.3)
PLATELET # BLD AUTO: 320 K/UL (ref 130–450)
PMV BLD AUTO: 10.3 FL (ref 7–12)
POTASSIUM SERPL-SCNC: 4.8 MMOL/L (ref 3.5–5)
PROT SERPL-MCNC: 7.4 G/DL (ref 6.4–8.3)
RBC # BLD AUTO: 4.22 M/UL (ref 3.5–5.5)
SODIUM SERPL-SCNC: 142 MMOL/L (ref 132–146)
WBC OTHER # BLD: 8.6 K/UL (ref 4.5–11.5)

## 2023-09-28 PROCEDURE — 99285 EMERGENCY DEPT VISIT HI MDM: CPT

## 2023-09-28 PROCEDURE — A9577 INJ MULTIHANCE: HCPCS | Performed by: RADIOLOGY

## 2023-09-28 PROCEDURE — 80053 COMPREHEN METABOLIC PANEL: CPT

## 2023-09-28 PROCEDURE — 6360000004 HC RX CONTRAST MEDICATION: Performed by: RADIOLOGY

## 2023-09-28 PROCEDURE — 6370000000 HC RX 637 (ALT 250 FOR IP): Performed by: STUDENT IN AN ORGANIZED HEALTH CARE EDUCATION/TRAINING PROGRAM

## 2023-09-28 PROCEDURE — 85025 COMPLETE CBC W/AUTO DIFF WBC: CPT

## 2023-09-28 PROCEDURE — 72158 MRI LUMBAR SPINE W/O & W/DYE: CPT

## 2023-09-28 RX ORDER — HYDROCODONE BITARTRATE AND ACETAMINOPHEN 5; 325 MG/1; MG/1
1 TABLET ORAL ONCE
Status: COMPLETED | OUTPATIENT
Start: 2023-09-28 | End: 2023-09-28

## 2023-09-28 RX ADMIN — HYDROCODONE BITARTRATE AND ACETAMINOPHEN 1 TABLET: 5; 325 TABLET ORAL at 17:51

## 2023-09-28 RX ADMIN — HYDROCODONE BITARTRATE AND ACETAMINOPHEN 1 TABLET: 5; 325 TABLET ORAL at 21:10

## 2023-09-28 RX ADMIN — GADOBENATE DIMEGLUMINE 20 ML: 529 INJECTION, SOLUTION INTRAVENOUS at 19:49

## 2023-09-28 ASSESSMENT — ENCOUNTER SYMPTOMS
DIARRHEA: 0
VOMITING: 0
SHORTNESS OF BREATH: 0
COLOR CHANGE: 0
ABDOMINAL PAIN: 0
NAUSEA: 0
BACK PAIN: 1
COUGH: 0

## 2023-09-28 ASSESSMENT — PAIN SCALES - GENERAL
PAINLEVEL_OUTOF10: 10
PAINLEVEL_OUTOF10: 10

## 2023-09-28 ASSESSMENT — PAIN DESCRIPTION - DESCRIPTORS: DESCRIPTORS: SHARP

## 2023-09-28 ASSESSMENT — PAIN - FUNCTIONAL ASSESSMENT: PAIN_FUNCTIONAL_ASSESSMENT: 0-10

## 2023-09-28 ASSESSMENT — PAIN DESCRIPTION - LOCATION
LOCATION: BACK
LOCATION: BACK;BUTTOCKS;LEG

## 2023-09-28 ASSESSMENT — PAIN DESCRIPTION - ORIENTATION: ORIENTATION: LEFT

## 2023-09-28 NOTE — ED PROVIDER NOTES
normal.      Breath sounds: Normal breath sounds. Abdominal:      General: There is no distension. Tenderness: There is no abdominal tenderness. Musculoskeletal:         General: Normal range of motion. Cervical back: Neck supple. Comments: Strength 4 out of 5 in the left lower extremity, 5 out of 5 in the right lower extremity. 2+ DP pulses bilaterally. Producible tenderness to palpation over the left lateral lumbar region around L4-L5 area but in the paraspinal musculature no tenderness in the midline. There is also some tenderness in the SI joint area. Skin:     General: Skin is warm. Capillary Refill: Capillary refill takes less than 2 seconds. Neurological:      General: No focal deficit present. Mental Status: She is alert. Comments: Sensation mildly decreased in the left leg anteriorly as compared to the right leg. Procedures     MDM  Patient presented to emergency department for concern of possible cauda equina. She does have shooting pain in the left SI joint left lower back radiating down the left leg. She does have slightly diminished sensation to the left leg as compared to the right with slightly diminished strength this has been ongoing and chronic. She does have exacerbation of her pain here. With patient passing gas and having associated passing of soft stool the previous provider was concern for possible cauda equina. MRI here was obtained, noted to have severe spinal stenosis at L4-L5 which may be creased compared to prior, they note no new impingement upon L5 nerve root. No cauda equina. Patient is ambulatory with walker at baseline. She was given Norco for pain here which she does have at home as well. She is to follow-up with her pain management physician and she is also to follow-up with her spinal surgeon. She was given copy of the MRI reports as well. Vitals remained stable. She is stable for discharge home.

## 2023-09-29 VITALS
RESPIRATION RATE: 16 BRPM | TEMPERATURE: 98.2 F | HEART RATE: 72 BPM | OXYGEN SATURATION: 97 % | DIASTOLIC BLOOD PRESSURE: 64 MMHG | SYSTOLIC BLOOD PRESSURE: 187 MMHG

## 2023-09-29 NOTE — DISCHARGE INSTRUCTIONS
FINDINGS:  BONES/ALIGNMENT: There is stable mild scoliosis of the lumbar spine convex  towards the right with the apex at the L2-3 level. Again seen are changes of laminectomy and fusion from L2 through L4 with  bilateral inter pedicular screws and vertical connecting rods. The fused  segments remain in anatomic alignment. There is no sign of residual or  recurrent stenosis in the area of fusion. There is stable mild anterior wedging of the L1 and L2 vertebral bodies  without any osseous edema to suggest acute fracture. These findings are  consistent with old compression fractures. There is slightly increased grade 1 anterior subluxation of L5 on S1 related  to bilateral facet arthropathy. SPINAL CORD:  The conus terminates normally. SOFT TISSUES: No abnormal enhancement is seen of the lumbar spine. No  paraspinal mass identified. T12-L1: Stable mild central and right disc bulge with posterior osteophytic  ridging, unchanged compared to the previous study. No foraminal stenosis. Stable severe disc degenerative disease. Mild bilateral facet arthropathy. L1-L2: Stable mild diffuse disc bulging with posterior osteophytic ridging,  without spinal stenosis. Stable moderate right and mild left foraminal zone  disc bulging without contact with the exiting nerve roots. Progression of  mild bilateral facet arthropathy. Severe disc degenerative disease. L2-L3: Status post brought central laminectomy with stable mild broad  posterior osteophytic ridging without spinal stenosis. Posterior osteophytes  extend into both neural foramina without impingement upon the exiting nerve  roots, unchanged from the previous study. Stable solid osseous fusion. L3-L4: Status post broad central laminectomy with stable minimal posterior  osteophytic ridging, without spinal stenosis.   Posterior osteophytes result  in mild narrowing of the neural foramina, without impingement upon the  exiting nerve

## 2023-11-02 ENCOUNTER — HOSPITAL ENCOUNTER (OUTPATIENT)
Age: 78
Discharge: HOME | End: 2023-11-04

## 2023-11-02 LAB
ABO + RH BLD: NORMAL
ANION GAP SERPL CALCULATED.3IONS-SCNC: 10 MMOL/L (ref 7–16)
ARM BAND NUMBER: NORMAL
BLOOD BANK SAMPLE EXPIRATION: NORMAL
BLOOD GROUP ANTIBODIES SERPL: NEGATIVE
BUN SERPL-MCNC: 28 MG/DL (ref 6–23)
CALCIUM SERPL-MCNC: 9.6 MG/DL (ref 8.6–10.2)
CHLORIDE SERPL-SCNC: 101 MMOL/L (ref 98–107)
CO2 SERPL-SCNC: 28 MMOL/L (ref 22–29)
CREAT SERPL-MCNC: 1.3 MG/DL (ref 0.5–1)
GFR SERPL CREATININE-BSD FRML MDRD: 41 ML/MIN/1.73M2
GLUCOSE SERPL-MCNC: 127 MG/DL (ref 74–99)
POTASSIUM SERPL-SCNC: 4.3 MMOL/L (ref 3.5–5)
SODIUM SERPL-SCNC: 139 MMOL/L (ref 132–146)

## 2023-11-02 PROCEDURE — 86850 RBC ANTIBODY SCREEN: CPT

## 2023-11-02 PROCEDURE — 87081 CULTURE SCREEN ONLY: CPT

## 2023-11-02 PROCEDURE — 86901 BLOOD TYPING SEROLOGIC RH(D): CPT

## 2023-11-02 PROCEDURE — 86900 BLOOD TYPING SEROLOGIC ABO: CPT

## 2023-11-02 PROCEDURE — 80048 BASIC METABOLIC PNL TOTAL CA: CPT

## 2023-11-04 LAB
MICROORGANISM SPEC CULT: NORMAL
SPECIMEN DESCRIPTION: NORMAL

## 2023-11-10 ENCOUNTER — HOSPITAL ENCOUNTER (OUTPATIENT)
Age: 78
DRG: 175 | End: 2023-11-10
Payer: MEDICARE

## 2023-11-10 LAB
ANION GAP SERPL CALCULATED.3IONS-SCNC: 13 MMOL/L (ref 7–16)
BUN SERPL-MCNC: 24 MG/DL (ref 6–23)
CALCIUM SERPL-MCNC: 8.4 MG/DL (ref 8.6–10.2)
CHLORIDE SERPL-SCNC: 101 MMOL/L (ref 98–107)
CO2 SERPL-SCNC: 21 MMOL/L (ref 22–29)
CREAT SERPL-MCNC: 1.1 MG/DL (ref 0.5–1)
ERYTHROCYTE [DISTWIDTH] IN BLOOD BY AUTOMATED COUNT: 12.9 % (ref 11.5–15)
GFR SERPL CREATININE-BSD FRML MDRD: 52 ML/MIN/1.73M2
GLUCOSE SERPL-MCNC: 209 MG/DL (ref 74–99)
HCT VFR BLD AUTO: 32.8 % (ref 34–48)
HGB BLD-MCNC: 10.3 G/DL (ref 11.5–15.5)
MCH RBC QN AUTO: 29.4 PG (ref 26–35)
MCHC RBC AUTO-ENTMCNC: 31.4 G/DL (ref 32–34.5)
MCV RBC AUTO: 93.7 FL (ref 80–99.9)
PLATELET # BLD AUTO: 261 K/UL (ref 130–450)
PMV BLD AUTO: 10.8 FL (ref 7–12)
POTASSIUM SERPL-SCNC: 4.7 MMOL/L (ref 3.5–5)
RBC # BLD AUTO: 3.5 M/UL (ref 3.5–5.5)
SODIUM SERPL-SCNC: 135 MMOL/L (ref 132–146)
WBC OTHER # BLD: 18.1 K/UL (ref 4.5–11.5)

## 2023-11-10 PROCEDURE — 80048 BASIC METABOLIC PNL TOTAL CA: CPT

## 2023-11-10 PROCEDURE — 85027 COMPLETE CBC AUTOMATED: CPT

## 2023-11-11 LAB
ANION GAP SERPL CALCULATED.3IONS-SCNC: 13 MMOL/L (ref 7–16)
BNP SERPL-MCNC: ABNORMAL PG/ML (ref 0–450)
BUN SERPL-MCNC: 29 MG/DL (ref 6–23)
CALCIUM SERPL-MCNC: 9 MG/DL (ref 8.6–10.2)
CHLORIDE SERPL-SCNC: 101 MMOL/L (ref 98–107)
CO2 SERPL-SCNC: 23 MMOL/L (ref 22–29)
CREAT SERPL-MCNC: 1.5 MG/DL (ref 0.5–1)
ERYTHROCYTE [DISTWIDTH] IN BLOOD BY AUTOMATED COUNT: 13.3 % (ref 11.5–15)
GFR SERPL CREATININE-BSD FRML MDRD: 36 ML/MIN/1.73M2
GLUCOSE SERPL-MCNC: 158 MG/DL (ref 74–99)
HCT VFR BLD AUTO: 29.7 % (ref 34–48)
HGB BLD-MCNC: 9.2 G/DL (ref 11.5–15.5)
MCH RBC QN AUTO: 29.7 PG (ref 26–35)
MCHC RBC AUTO-ENTMCNC: 31 G/DL (ref 32–34.5)
MCV RBC AUTO: 95.8 FL (ref 80–99.9)
PLATELET # BLD AUTO: 225 K/UL (ref 130–450)
PMV BLD AUTO: 11 FL (ref 7–12)
POTASSIUM SERPL-SCNC: 4.5 MMOL/L (ref 3.5–5)
RBC # BLD AUTO: 3.1 M/UL (ref 3.5–5.5)
SODIUM SERPL-SCNC: 137 MMOL/L (ref 132–146)
WBC OTHER # BLD: 19.2 K/UL (ref 4.5–11.5)

## 2023-11-11 PROCEDURE — 83880 ASSAY OF NATRIURETIC PEPTIDE: CPT

## 2023-11-11 PROCEDURE — 80048 BASIC METABOLIC PNL TOTAL CA: CPT

## 2023-11-11 PROCEDURE — 85027 COMPLETE CBC AUTOMATED: CPT

## 2023-11-11 PROCEDURE — 84145 PROCALCITONIN (PCT): CPT

## 2023-11-11 PROCEDURE — 87040 BLOOD CULTURE FOR BACTERIA: CPT

## 2023-11-12 ENCOUNTER — APPOINTMENT (OUTPATIENT)
Dept: GENERAL RADIOLOGY | Age: 78
DRG: 175 | End: 2023-11-12
Payer: MEDICARE

## 2023-11-12 ENCOUNTER — HOSPITAL ENCOUNTER (OUTPATIENT)
Age: 78
Discharge: HOME | End: 2023-11-14

## 2023-11-12 ENCOUNTER — APPOINTMENT (OUTPATIENT)
Dept: CT IMAGING | Age: 78
DRG: 175 | End: 2023-11-12
Payer: MEDICARE

## 2023-11-12 ENCOUNTER — HOSPITAL ENCOUNTER (INPATIENT)
Age: 78
LOS: 1 days | Discharge: ANOTHER HOSPITAL | DRG: 175 | End: 2023-11-13
Attending: STUDENT IN AN ORGANIZED HEALTH CARE EDUCATION/TRAINING PROGRAM | Admitting: INTERNAL MEDICINE
Payer: MEDICARE

## 2023-11-12 DIAGNOSIS — J96.01 ACUTE RESPIRATORY FAILURE WITH HYPOXIA (HCC): ICD-10-CM

## 2023-11-12 DIAGNOSIS — I26.99 OTHER ACUTE PULMONARY EMBOLISM WITHOUT ACUTE COR PULMONALE (HCC): Primary | ICD-10-CM

## 2023-11-12 DIAGNOSIS — J18.9 PNEUMONIA OF BOTH LUNGS DUE TO INFECTIOUS ORGANISM, UNSPECIFIED PART OF LUNG: ICD-10-CM

## 2023-11-12 DIAGNOSIS — I21.4 NSTEMI (NON-ST ELEVATED MYOCARDIAL INFARCTION) (HCC): ICD-10-CM

## 2023-11-12 LAB
ANION GAP SERPL CALCULATED.3IONS-SCNC: 9 MMOL/L (ref 7–16)
ANION GAP SERPL CALCULATED.3IONS-SCNC: 9 MMOL/L (ref 7–16)
BASOPHILS # BLD: 0.04 K/UL (ref 0–0.2)
BASOPHILS NFR BLD: 0 % (ref 0–2)
BNP SERPL-MCNC: ABNORMAL PG/ML (ref 0–450)
BUN SERPL-MCNC: 30 MG/DL (ref 6–23)
BUN SERPL-MCNC: 33 MG/DL (ref 6–23)
CALCIUM SERPL-MCNC: 9 MG/DL (ref 8.6–10.2)
CALCIUM SERPL-MCNC: 9.1 MG/DL (ref 8.6–10.2)
CHLORIDE SERPL-SCNC: 100 MMOL/L (ref 98–107)
CHLORIDE SERPL-SCNC: 102 MMOL/L (ref 98–107)
CO2 SERPL-SCNC: 23 MMOL/L (ref 22–29)
CO2 SERPL-SCNC: 25 MMOL/L (ref 22–29)
CREAT SERPL-MCNC: 1.2 MG/DL (ref 0.5–1)
CREAT SERPL-MCNC: 1.4 MG/DL (ref 0.5–1)
D DIMER: 1826 NG/ML DDU (ref 0–232)
EOSINOPHIL # BLD: 0.03 K/UL (ref 0.05–0.5)
EOSINOPHILS RELATIVE PERCENT: 0 % (ref 0–6)
ERYTHROCYTE [DISTWIDTH] IN BLOOD BY AUTOMATED COUNT: 13.2 % (ref 11.5–15)
ERYTHROCYTE [DISTWIDTH] IN BLOOD BY AUTOMATED COUNT: 13.2 % (ref 11.5–15)
GFR SERPL CREATININE-BSD FRML MDRD: 38 ML/MIN/1.73M2
GFR SERPL CREATININE-BSD FRML MDRD: 46 ML/MIN/1.73M2
GLUCOSE SERPL-MCNC: 169 MG/DL (ref 74–99)
GLUCOSE SERPL-MCNC: 217 MG/DL (ref 74–99)
HCT VFR BLD AUTO: 29 % (ref 34–48)
HCT VFR BLD AUTO: 30.5 % (ref 34–48)
HGB BLD-MCNC: 9.4 G/DL (ref 11.5–15.5)
HGB BLD-MCNC: 9.6 G/DL (ref 11.5–15.5)
IMM GRANULOCYTES # BLD AUTO: 0.14 K/UL (ref 0–0.58)
IMM GRANULOCYTES NFR BLD: 1 % (ref 0–5)
LYMPHOCYTES NFR BLD: 0.96 K/UL (ref 1.5–4)
LYMPHOCYTES RELATIVE PERCENT: 6 % (ref 20–42)
MCH RBC QN AUTO: 29.7 PG (ref 26–35)
MCH RBC QN AUTO: 29.9 PG (ref 26–35)
MCHC RBC AUTO-ENTMCNC: 31.5 G/DL (ref 32–34.5)
MCHC RBC AUTO-ENTMCNC: 32.4 G/DL (ref 32–34.5)
MCV RBC AUTO: 91.5 FL (ref 80–99.9)
MCV RBC AUTO: 95 FL (ref 80–99.9)
MONOCYTES NFR BLD: 1.14 K/UL (ref 0.1–0.95)
MONOCYTES NFR BLD: 7 % (ref 2–12)
NEUTROPHILS NFR BLD: 86 % (ref 43–80)
NEUTS SEG NFR BLD: 14.63 K/UL (ref 1.8–7.3)
PLATELET # BLD AUTO: 207 K/UL (ref 130–450)
PLATELET # BLD AUTO: 235 K/UL (ref 130–450)
PMV BLD AUTO: 10.6 FL (ref 7–12)
PMV BLD AUTO: 11.1 FL (ref 7–12)
POTASSIUM SERPL-SCNC: 4.4 MMOL/L (ref 3.5–5)
POTASSIUM SERPL-SCNC: 5 MMOL/L (ref 3.5–5)
RBC # BLD AUTO: 3.17 M/UL (ref 3.5–5.5)
RBC # BLD AUTO: 3.21 M/UL (ref 3.5–5.5)
SODIUM SERPL-SCNC: 134 MMOL/L (ref 132–146)
SODIUM SERPL-SCNC: 134 MMOL/L (ref 132–146)
TROPONIN I SERPL HS-MCNC: 777 NG/L (ref 0–9)
TROPONIN I SERPL HS-MCNC: NORMAL NG/L (ref 0–14)
TROPONIN INTERP: NORMAL
TROPONIN T SERPL-MCNC: NORMAL NG/ML
WBC OTHER # BLD: 16.2 K/UL (ref 4.5–11.5)
WBC OTHER # BLD: 16.9 K/UL (ref 4.5–11.5)

## 2023-11-12 PROCEDURE — 6370000000 HC RX 637 (ALT 250 FOR IP): Performed by: STUDENT IN AN ORGANIZED HEALTH CARE EDUCATION/TRAINING PROGRAM

## 2023-11-12 PROCEDURE — 85379 FIBRIN DEGRADATION QUANT: CPT

## 2023-11-12 PROCEDURE — 71045 X-RAY EXAM CHEST 1 VIEW: CPT

## 2023-11-12 PROCEDURE — 85025 COMPLETE CBC W/AUTO DIFF WBC: CPT

## 2023-11-12 PROCEDURE — 99285 EMERGENCY DEPT VISIT HI MDM: CPT

## 2023-11-12 PROCEDURE — 93005 ELECTROCARDIOGRAM TRACING: CPT | Performed by: STUDENT IN AN ORGANIZED HEALTH CARE EDUCATION/TRAINING PROGRAM

## 2023-11-12 PROCEDURE — 83880 ASSAY OF NATRIURETIC PEPTIDE: CPT

## 2023-11-12 PROCEDURE — 80048 BASIC METABOLIC PNL TOTAL CA: CPT

## 2023-11-12 PROCEDURE — 96375 TX/PRO/DX INJ NEW DRUG ADDON: CPT

## 2023-11-12 PROCEDURE — 84484 ASSAY OF TROPONIN QUANT: CPT

## 2023-11-12 PROCEDURE — 85027 COMPLETE CBC AUTOMATED: CPT

## 2023-11-12 PROCEDURE — 6360000002 HC RX W HCPCS: Performed by: STUDENT IN AN ORGANIZED HEALTH CARE EDUCATION/TRAINING PROGRAM

## 2023-11-12 PROCEDURE — 96374 THER/PROPH/DIAG INJ IV PUSH: CPT

## 2023-11-12 RX ORDER — ASPIRIN 81 MG/1
324 TABLET, CHEWABLE ORAL ONCE
Status: COMPLETED | OUTPATIENT
Start: 2023-11-12 | End: 2023-11-12

## 2023-11-12 RX ORDER — FENTANYL CITRATE 50 UG/ML
50 INJECTION, SOLUTION INTRAMUSCULAR; INTRAVENOUS ONCE
Status: COMPLETED | OUTPATIENT
Start: 2023-11-12 | End: 2023-11-12

## 2023-11-12 RX ORDER — DIPHENHYDRAMINE HYDROCHLORIDE 50 MG/ML
50 INJECTION INTRAMUSCULAR; INTRAVENOUS ONCE
Status: COMPLETED | OUTPATIENT
Start: 2023-11-12 | End: 2023-11-12

## 2023-11-12 RX ADMIN — METHYLPREDNISOLONE SODIUM SUCCINATE 125 MG: 125 INJECTION, POWDER, FOR SOLUTION INTRAMUSCULAR; INTRAVENOUS at 21:46

## 2023-11-12 RX ADMIN — FENTANYL CITRATE 50 MCG: 50 INJECTION, SOLUTION INTRAMUSCULAR; INTRAVENOUS at 21:46

## 2023-11-12 RX ADMIN — ASPIRIN 81 MG CHEWABLE TABLET 324 MG: 81 TABLET CHEWABLE at 21:46

## 2023-11-12 RX ADMIN — DIPHENHYDRAMINE HYDROCHLORIDE 50 MG: 50 INJECTION INTRAMUSCULAR; INTRAVENOUS at 21:46

## 2023-11-13 ENCOUNTER — APPOINTMENT (OUTPATIENT)
Age: 78
DRG: 321 | End: 2023-11-13
Attending: INTERNAL MEDICINE
Payer: MEDICARE

## 2023-11-13 ENCOUNTER — APPOINTMENT (OUTPATIENT)
Dept: CT IMAGING | Age: 78
DRG: 175 | End: 2023-11-13
Payer: MEDICARE

## 2023-11-13 ENCOUNTER — APPOINTMENT (OUTPATIENT)
Dept: GENERAL RADIOLOGY | Age: 78
DRG: 175 | End: 2023-11-13
Payer: MEDICARE

## 2023-11-13 ENCOUNTER — HOSPITAL ENCOUNTER (INPATIENT)
Age: 78
LOS: 8 days | Discharge: NURSING, REHAB OR LONG-TERM CARE | DRG: 321 | End: 2023-11-21
Attending: INTERNAL MEDICINE | Admitting: INTERNAL MEDICINE
Payer: MEDICARE

## 2023-11-13 VITALS
OXYGEN SATURATION: 95 % | RESPIRATION RATE: 12 BRPM | TEMPERATURE: 99.8 F | SYSTOLIC BLOOD PRESSURE: 140 MMHG | WEIGHT: 200 LBS | HEIGHT: 64 IN | DIASTOLIC BLOOD PRESSURE: 69 MMHG | HEART RATE: 107 BPM | BODY MASS INDEX: 34.15 KG/M2

## 2023-11-13 DIAGNOSIS — R07.9 CHEST PAIN: ICD-10-CM

## 2023-11-13 DIAGNOSIS — I26.99 OTHER ACUTE PULMONARY EMBOLISM, UNSPECIFIED WHETHER ACUTE COR PULMONALE PRESENT (HCC): Primary | ICD-10-CM

## 2023-11-13 DIAGNOSIS — M96.1 POST-LAMINECTOMY SYNDROME: ICD-10-CM

## 2023-11-13 PROBLEM — D63.1 ANEMIA IN CHRONIC KIDNEY DISEASE: Status: ACTIVE | Noted: 2021-09-14

## 2023-11-13 PROBLEM — E87.5 HYPERKALEMIA: Status: ACTIVE | Noted: 2021-08-31

## 2023-11-13 PROBLEM — E78.2 MIXED HYPERLIPIDEMIA: Status: ACTIVE | Noted: 2019-05-29

## 2023-11-13 PROBLEM — I21.4 NSTEMI (NON-ST ELEVATED MYOCARDIAL INFARCTION) (HCC): Status: ACTIVE | Noted: 2023-11-13

## 2023-11-13 PROBLEM — M54.31 SCIATICA OF RIGHT SIDE: Status: ACTIVE | Noted: 2020-08-10

## 2023-11-13 PROBLEM — R26.81 UNSTEADINESS ON FEET: Status: ACTIVE | Noted: 2020-08-10

## 2023-11-13 PROBLEM — E79.0 HYPERURICEMIA: Status: ACTIVE | Noted: 2021-08-31

## 2023-11-13 PROBLEM — E83.42 HYPOMAGNESEMIA: Status: ACTIVE | Noted: 2021-08-31

## 2023-11-13 PROBLEM — E11.22 TYPE 2 DIABETES MELLITUS WITH CHRONIC KIDNEY DISEASE (HCC): Status: ACTIVE | Noted: 2021-08-31

## 2023-11-13 PROBLEM — D50.9 IRON DEFICIENCY ANEMIA: Status: ACTIVE | Noted: 2021-08-31

## 2023-11-13 PROBLEM — I10 HYPERTENSION: Status: ACTIVE | Noted: 2019-01-23

## 2023-11-13 PROBLEM — L98.9 DISORDER OF SKIN: Status: ACTIVE | Noted: 2023-11-13

## 2023-11-13 PROBLEM — M54.17 LUMBOSACRAL RADICULOPATHY: Status: ACTIVE | Noted: 2023-11-13

## 2023-11-13 PROBLEM — N18.9 ANEMIA IN CHRONIC KIDNEY DISEASE: Status: ACTIVE | Noted: 2021-09-14

## 2023-11-13 PROBLEM — N17.9 ACUTE RENAL FAILURE (HCC): Status: ACTIVE | Noted: 2021-08-31

## 2023-11-13 PROBLEM — R80.9 PROTEINURIA: Status: ACTIVE | Noted: 2021-08-31

## 2023-11-13 PROBLEM — I12.9 BENIGN HYPERTENSIVE KIDNEY DISEASE WITH CHRONIC KIDNEY DISEASE: Status: ACTIVE | Noted: 2021-08-31

## 2023-11-13 PROBLEM — N18.32 STAGE 3B CHRONIC KIDNEY DISEASE (HCC): Status: ACTIVE | Noted: 2021-08-31

## 2023-11-13 LAB
AADO2: 161.9 MMHG
ALBUMIN SERPL-MCNC: 2.8 G/DL (ref 3.5–5.2)
ALP SERPL-CCNC: 69 U/L (ref 35–104)
ALT SERPL-CCNC: 10 U/L (ref 0–32)
ANION GAP SERPL CALCULATED.3IONS-SCNC: 12 MMOL/L (ref 7–16)
ANION GAP SERPL CALCULATED.3IONS-SCNC: 9 MMOL/L (ref 7–16)
AST SERPL-CCNC: 26 U/L (ref 0–31)
B PARAP IS1001 DNA NPH QL NAA+NON-PROBE: NOT DETECTED
B PERT DNA SPEC QL NAA+PROBE: NOT DETECTED
B.E.: -0.6 MMOL/L (ref -3–3)
B.E.: -7.3 MMOL/L (ref -3–3)
BACTERIA URNS QL MICRO: ABNORMAL
BASOPHILS # BLD: 0.04 K/UL (ref 0–0.2)
BASOPHILS NFR BLD: 0 % (ref 0–2)
BILIRUB DIRECT SERPL-MCNC: 0.2 MG/DL (ref 0–0.3)
BILIRUB INDIRECT SERPL-MCNC: 0.4 MG/DL (ref 0–1)
BILIRUB SERPL-MCNC: 0.6 MG/DL (ref 0–1.2)
BILIRUB UR QL STRIP: NEGATIVE
BUN SERPL-MCNC: 38 MG/DL (ref 6–23)
BUN SERPL-MCNC: 43 MG/DL (ref 6–23)
C PNEUM DNA NPH QL NAA+NON-PROBE: NOT DETECTED
CALCIUM SERPL-MCNC: 8.4 MG/DL (ref 8.6–10.2)
CALCIUM SERPL-MCNC: 8.6 MG/DL (ref 8.6–10.2)
CHLORIDE SERPL-SCNC: 101 MMOL/L (ref 98–107)
CHLORIDE SERPL-SCNC: 101 MMOL/L (ref 98–107)
CLARITY UR: CLEAR
CO2 SERPL-SCNC: 21 MMOL/L (ref 22–29)
CO2 SERPL-SCNC: 24 MMOL/L (ref 22–29)
COHB: 0.3 % (ref 0–1.5)
COHB: 0.5 % (ref 0–1.5)
COLOR UR: YELLOW
CREAT SERPL-MCNC: 1.2 MG/DL (ref 0.5–1)
CREAT SERPL-MCNC: 1.5 MG/DL (ref 0.5–1)
CRITICAL: ABNORMAL
CRITICAL: ABNORMAL
DATE ANALYZED: ABNORMAL
DATE ANALYZED: ABNORMAL
DATE OF COLLECTION: ABNORMAL
DATE OF COLLECTION: ABNORMAL
ECHO AV AREA PEAK VELOCITY: 2.9 CM2
ECHO AV AREA VTI: 3.1 CM2
ECHO AV AREA/BSA PEAK VELOCITY: 1.5 CM2/M2
ECHO AV AREA/BSA VTI: 1.6 CM2/M2
ECHO AV MEAN GRADIENT: 4 MMHG
ECHO AV MEAN VELOCITY: 0.9 M/S
ECHO AV PEAK GRADIENT: 8 MMHG
ECHO AV PEAK VELOCITY: 1.4 M/S
ECHO AV VELOCITY RATIO: 0.79
ECHO AV VTI: 24.9 CM
ECHO BSA: 2.02 M2
ECHO EST RA PRESSURE: 15 MMHG
ECHO LA DIAMETER INDEX: 1.99 CM/M2
ECHO LA DIAMETER: 3.9 CM
ECHO LA VOL A-L A2C: 54 ML (ref 22–52)
ECHO LA VOL A-L A4C: 38 ML (ref 22–52)
ECHO LA VOL MOD A2C: 53 ML (ref 22–52)
ECHO LA VOL MOD A4C: 37 ML (ref 22–52)
ECHO LA VOLUME AREA LENGTH: 48 ML
ECHO LA VOLUME INDEX A-L A2C: 28 ML/M2 (ref 16–34)
ECHO LA VOLUME INDEX A-L A4C: 19 ML/M2 (ref 16–34)
ECHO LA VOLUME INDEX AREA LENGTH: 24 ML/M2 (ref 16–34)
ECHO LA VOLUME INDEX MOD A2C: 27 ML/M2 (ref 16–34)
ECHO LA VOLUME INDEX MOD A4C: 19 ML/M2 (ref 16–34)
ECHO LV EDV A2C: 140 ML
ECHO LV EDV A4C: 130 ML
ECHO LV EDV BP: 132 ML (ref 56–104)
ECHO LV EDV INDEX A4C: 66 ML/M2
ECHO LV EDV INDEX BP: 67 ML/M2
ECHO LV EDV NDEX A2C: 71 ML/M2
ECHO LV EJECTION FRACTION A2C: 31 %
ECHO LV EJECTION FRACTION A4C: 36 %
ECHO LV EJECTION FRACTION BIPLANE: 30 % (ref 55–100)
ECHO LV ESV A2C: 96 ML
ECHO LV ESV A4C: 84 ML
ECHO LV ESV BP: 92 ML (ref 19–49)
ECHO LV ESV INDEX A2C: 49 ML/M2
ECHO LV ESV INDEX A4C: 43 ML/M2
ECHO LV ESV INDEX BP: 47 ML/M2
ECHO LV FRACTIONAL SHORTENING: 10 % (ref 28–44)
ECHO LV INTERNAL DIMENSION DIASTOLE INDEX: 2.55 CM/M2
ECHO LV INTERNAL DIMENSION DIASTOLIC: 5 CM (ref 3.9–5.3)
ECHO LV INTERNAL DIMENSION SYSTOLIC INDEX: 2.3 CM/M2
ECHO LV INTERNAL DIMENSION SYSTOLIC: 4.5 CM
ECHO LV ISOVOLUMETRIC RELAXATION TIME (IVRT): 73.8 MS
ECHO LV IVSD: 1.1 CM (ref 0.6–0.9)
ECHO LV IVSS: 1.7 CM
ECHO LV MASS 2D: 194.4 G (ref 67–162)
ECHO LV MASS INDEX 2D: 99.2 G/M2 (ref 43–95)
ECHO LV POSTERIOR WALL DIASTOLIC: 1 CM (ref 0.6–0.9)
ECHO LV POSTERIOR WALL SYSTOLIC: 1.3 CM
ECHO LV RELATIVE WALL THICKNESS RATIO: 0.4
ECHO LVOT AREA: 3.5 CM2
ECHO LVOT AV VTI INDEX: 0.87
ECHO LVOT DIAM: 2.1 CM
ECHO LVOT MEAN GRADIENT: 3 MMHG
ECHO LVOT PEAK GRADIENT: 5 MMHG
ECHO LVOT PEAK VELOCITY: 1.1 M/S
ECHO LVOT STROKE VOLUME INDEX: 38.2 ML/M2
ECHO LVOT SV: 74.8 ML
ECHO LVOT VTI: 21.6 CM
ECHO MV A VELOCITY: 2.03 M/S
ECHO MV AREA PHT: 4.3 CM2
ECHO MV AREA VTI: 1.3 CM2
ECHO MV E DECELERATION TIME (DT): 191.6 MS
ECHO MV E VELOCITY: 1.91 M/S
ECHO MV E/A RATIO: 0.94
ECHO MV LVOT VTI INDEX: 2.64
ECHO MV MAX VELOCITY: 2.1 M/S
ECHO MV MEAN GRADIENT: 7 MMHG
ECHO MV MEAN VELOCITY: 1.2 M/S
ECHO MV PEAK GRADIENT: 17 MMHG
ECHO MV PRESSURE HALF TIME (PHT): 50.9 MS
ECHO MV VTI: 57.1 CM
ECHO PV MAX VELOCITY: 1.1 M/S
ECHO PV MEAN GRADIENT: 3 MMHG
ECHO PV MEAN VELOCITY: 0.9 M/S
ECHO PV PEAK GRADIENT: 5 MMHG
ECHO PV VTI: 20.9 CM
ECHO RV INTERNAL DIMENSION: 3.4 CM
EKG ATRIAL RATE: 111 BPM
EKG ATRIAL RATE: 121 BPM
EKG P AXIS: 56 DEGREES
EKG P AXIS: 62 DEGREES
EKG P-R INTERVAL: 148 MS
EKG P-R INTERVAL: 150 MS
EKG Q-T INTERVAL: 318 MS
EKG Q-T INTERVAL: 334 MS
EKG QRS DURATION: 108 MS
EKG QRS DURATION: 94 MS
EKG QTC CALCULATION (BAZETT): 451 MS
EKG QTC CALCULATION (BAZETT): 454 MS
EKG R AXIS: -12 DEGREES
EKG R AXIS: -5 DEGREES
EKG T AXIS: 111 DEGREES
EKG T AXIS: 128 DEGREES
EKG VENTRICULAR RATE: 111 BPM
EKG VENTRICULAR RATE: 121 BPM
EOSINOPHIL # BLD: 0 K/UL (ref 0.05–0.5)
EOSINOPHILS RELATIVE PERCENT: 0 % (ref 0–6)
ERYTHROCYTE [DISTWIDTH] IN BLOOD BY AUTOMATED COUNT: 13.2 % (ref 11.5–15)
ERYTHROCYTE [DISTWIDTH] IN BLOOD BY AUTOMATED COUNT: 13.2 % (ref 11.5–15)
FIO2: 100 %
FIO2: 40 %
FLUAV RNA NPH QL NAA+NON-PROBE: NOT DETECTED
FLUBV RNA NPH QL NAA+NON-PROBE: NOT DETECTED
GFR SERPL CREATININE-BSD FRML MDRD: 37 ML/MIN/1.73M2
GFR SERPL CREATININE-BSD FRML MDRD: 46 ML/MIN/1.73M2
GLUCOSE BLD-MCNC: 285 MG/DL (ref 74–99)
GLUCOSE BLD-MCNC: 350 MG/DL (ref 74–99)
GLUCOSE BLD-MCNC: 352 MG/DL (ref 74–99)
GLUCOSE BLD-MCNC: 377 MG/DL (ref 74–99)
GLUCOSE BLD-MCNC: 423 MG/DL (ref 74–99)
GLUCOSE BLD-MCNC: 434 MG/DL (ref 74–99)
GLUCOSE SERPL-MCNC: 355 MG/DL (ref 74–99)
GLUCOSE SERPL-MCNC: 446 MG/DL (ref 74–99)
GLUCOSE UR STRIP-MCNC: 500 MG/DL
HADV DNA NPH QL NAA+NON-PROBE: NOT DETECTED
HCO3: 19.6 MMOL/L (ref 22–26)
HCO3: 23.4 MMOL/L (ref 22–26)
HCOV 229E RNA NPH QL NAA+NON-PROBE: NOT DETECTED
HCOV HKU1 RNA NPH QL NAA+NON-PROBE: NOT DETECTED
HCOV NL63 RNA NPH QL NAA+NON-PROBE: NOT DETECTED
HCOV OC43 RNA NPH QL NAA+NON-PROBE: NOT DETECTED
HCT VFR BLD AUTO: 27.5 % (ref 34–48)
HCT VFR BLD AUTO: 32.4 % (ref 34–48)
HCYS SERPL-SCNC: 4.1 UMOL/L (ref 0–15)
HGB BLD-MCNC: 10.2 G/DL (ref 11.5–15.5)
HGB BLD-MCNC: 8.7 G/DL (ref 11.5–15.5)
HGB UR QL STRIP.AUTO: NEGATIVE
HHB: 1.6 % (ref 0–5)
HHB: 5.4 % (ref 0–5)
HMPV RNA NPH QL NAA+NON-PROBE: NOT DETECTED
HPIV1 RNA NPH QL NAA+NON-PROBE: NOT DETECTED
HPIV2 RNA NPH QL NAA+NON-PROBE: NOT DETECTED
HPIV3 RNA NPH QL NAA+NON-PROBE: NOT DETECTED
HPIV4 RNA NPH QL NAA+NON-PROBE: NOT DETECTED
IMM GRANULOCYTES # BLD AUTO: 0.2 K/UL (ref 0–0.58)
IMM GRANULOCYTES NFR BLD: 1 % (ref 0–5)
INR PPP: 1.5
KETONES UR STRIP-MCNC: NEGATIVE MG/DL
LAB: ABNORMAL
LAB: ABNORMAL
LACTATE BLDV-SCNC: 1.8 MMOL/L (ref 0.5–2.2)
LACTATE BLDV-SCNC: 2.5 MMOL/L (ref 0.5–1.9)
LEUKOCYTE ESTERASE UR QL STRIP: NEGATIVE
LYMPHOCYTES NFR BLD: 0.68 K/UL (ref 1.5–4)
LYMPHOCYTES RELATIVE PERCENT: 4 % (ref 20–42)
Lab: 1404
Lab: 232
M PNEUMO DNA NPH QL NAA+NON-PROBE: NOT DETECTED
MAGNESIUM SERPL-MCNC: 2.1 MG/DL (ref 1.6–2.6)
MAGNESIUM SERPL-MCNC: 3.8 MG/DL (ref 1.6–2.6)
MCH RBC QN AUTO: 29.7 PG (ref 26–35)
MCH RBC QN AUTO: 29.8 PG (ref 26–35)
MCHC RBC AUTO-ENTMCNC: 31.5 G/DL (ref 32–34.5)
MCHC RBC AUTO-ENTMCNC: 31.6 G/DL (ref 32–34.5)
MCV RBC AUTO: 94.2 FL (ref 80–99.9)
MCV RBC AUTO: 94.2 FL (ref 80–99.9)
METHB: 0.3 % (ref 0–1.5)
METHB: 0.5 % (ref 0–1.5)
MODE: ABNORMAL
MODE: ABNORMAL
MONOCYTES NFR BLD: 0.51 K/UL (ref 0.1–0.95)
MONOCYTES NFR BLD: 3 % (ref 2–12)
NEUTROPHILS NFR BLD: 91 % (ref 43–80)
NEUTS SEG NFR BLD: 14.7 K/UL (ref 1.8–7.3)
NITRITE UR QL STRIP: NEGATIVE
O2 CONTENT: 13.7 ML/DL
O2 CONTENT: 16.7 ML/DL
O2 SATURATION: 94.6 % (ref 92–98.5)
O2 SATURATION: 98.4 % (ref 92–98.5)
O2HB: 93.8 % (ref 94–97)
O2HB: 97.6 % (ref 94–97)
OPERATOR ID: 7296
OPERATOR ID: 882
PARTIAL THROMBOPLASTIN TIME: 29.7 SEC (ref 24.5–35.1)
PARTIAL THROMBOPLASTIN TIME: 61.1 SEC (ref 24.5–35.1)
PARTIAL THROMBOPLASTIN TIME: >240 SEC (ref 24.5–35.1)
PATIENT TEMP: 37 C
PATIENT TEMP: 37 C
PCO2: 35.9 MMHG (ref 35–45)
PCO2: 45.3 MMHG (ref 35–45)
PEEP/CPAP: 6 CMH2O
PFO2: 1.34 MMHG/%
PFO2: 1.8 MMHG/%
PH BLOOD GAS: 7.25 (ref 7.35–7.45)
PH BLOOD GAS: 7.43 (ref 7.35–7.45)
PH UR STRIP: 5.5 [PH] (ref 5–9)
PHOSPHATE SERPL-MCNC: 2.8 MG/DL (ref 2.5–4.5)
PHOSPHATE SERPL-MCNC: 2.9 MG/DL (ref 2.5–4.5)
PIP: 12 CMH2O
PLATELET # BLD AUTO: 222 K/UL (ref 130–450)
PLATELET # BLD AUTO: 279 K/UL (ref 130–450)
PMV BLD AUTO: 10.7 FL (ref 7–12)
PMV BLD AUTO: 11.1 FL (ref 7–12)
PO2: 134.1 MMHG (ref 75–100)
PO2: 72 MMHG (ref 75–100)
POTASSIUM SERPL-SCNC: 4.8 MMOL/L (ref 3.5–5)
POTASSIUM SERPL-SCNC: 5.3 MMOL/L (ref 3.5–5)
PROCALCITONIN SERPL-MCNC: 0.34 NG/ML (ref 0–0.08)
PROCALCITONIN SERPL-MCNC: 0.64 NG/ML (ref 0–0.08)
PROT SERPL-MCNC: 6.2 G/DL (ref 6.4–8.3)
PROT UR STRIP-MCNC: NEGATIVE MG/DL
PROTHROMBIN TIME: 16.5 SEC (ref 9.3–12.4)
RBC # BLD AUTO: 2.92 M/UL (ref 3.5–5.5)
RBC # BLD AUTO: 3.44 M/UL (ref 3.5–5.5)
RBC #/AREA URNS HPF: ABNORMAL /HPF
RI(T): 2.25
RI(T): 3.98
RSV RNA NPH QL NAA+NON-PROBE: NOT DETECTED
RV+EV RNA NPH QL NAA+NON-PROBE: NOT DETECTED
SARS-COV-2 RNA NPH QL NAA+NON-PROBE: NOT DETECTED
SODIUM SERPL-SCNC: 134 MMOL/L (ref 132–146)
SODIUM SERPL-SCNC: 134 MMOL/L (ref 132–146)
SOURCE, BLOOD GAS: ABNORMAL
SOURCE, BLOOD GAS: ABNORMAL
SP GR UR STRIP: 1.01 (ref 1–1.03)
SPECIMEN DESCRIPTION: NORMAL
THB: 10.3 G/DL (ref 11.5–16.5)
THB: 12 G/DL (ref 11.5–16.5)
TIME ANALYZED: 1409
TIME ANALYZED: 243
TROPONIN I SERPL HS-MCNC: 694 NG/L (ref 0–9)
UROBILINOGEN UR STRIP-ACNC: 0.2 EU/DL (ref 0–1)
WBC #/AREA URNS HPF: ABNORMAL /HPF
WBC OTHER # BLD: 16.1 K/UL (ref 4.5–11.5)
WBC OTHER # BLD: 21.6 K/UL (ref 4.5–11.5)

## 2023-11-13 PROCEDURE — 87086 URINE CULTURE/COLONY COUNT: CPT

## 2023-11-13 PROCEDURE — 0202U NFCT DS 22 TRGT SARS-COV-2: CPT

## 2023-11-13 PROCEDURE — 80053 COMPREHEN METABOLIC PANEL: CPT

## 2023-11-13 PROCEDURE — 82962 GLUCOSE BLOOD TEST: CPT

## 2023-11-13 PROCEDURE — 81001 URINALYSIS AUTO W/SCOPE: CPT

## 2023-11-13 PROCEDURE — 2580000003 HC RX 258

## 2023-11-13 PROCEDURE — 6360000002 HC RX W HCPCS: Performed by: CLINICAL NURSE SPECIALIST

## 2023-11-13 PROCEDURE — 2700000000 HC OXYGEN THERAPY PER DAY

## 2023-11-13 PROCEDURE — 84484 ASSAY OF TROPONIN QUANT: CPT

## 2023-11-13 PROCEDURE — 2500000003 HC RX 250 WO HCPCS

## 2023-11-13 PROCEDURE — 80048 BASIC METABOLIC PNL TOTAL CA: CPT

## 2023-11-13 PROCEDURE — 2000000000 HC ICU R&B

## 2023-11-13 PROCEDURE — 85025 COMPLETE CBC W/AUTO DIFF WBC: CPT

## 2023-11-13 PROCEDURE — 94660 CPAP INITIATION&MGMT: CPT

## 2023-11-13 PROCEDURE — 83605 ASSAY OF LACTIC ACID: CPT

## 2023-11-13 PROCEDURE — 87205 SMEAR GRAM STAIN: CPT

## 2023-11-13 PROCEDURE — 82805 BLOOD GASES W/O2 SATURATION: CPT

## 2023-11-13 PROCEDURE — 87070 CULTURE OTHR SPECIMN AEROBIC: CPT

## 2023-11-13 PROCEDURE — C8929 TTE W OR WO FOL WCON,DOPPLER: HCPCS

## 2023-11-13 PROCEDURE — C9113 INJ PANTOPRAZOLE SODIUM, VIA: HCPCS

## 2023-11-13 PROCEDURE — 93010 ELECTROCARDIOGRAM REPORT: CPT | Performed by: INTERNAL MEDICINE

## 2023-11-13 PROCEDURE — 5A09357 ASSISTANCE WITH RESPIRATORY VENTILATION, LESS THAN 24 CONSECUTIVE HOURS, CONTINUOUS POSITIVE AIRWAY PRESSURE: ICD-10-PCS | Performed by: INTERNAL MEDICINE

## 2023-11-13 PROCEDURE — 85610 PROTHROMBIN TIME: CPT

## 2023-11-13 PROCEDURE — 6370000000 HC RX 637 (ALT 250 FOR IP): Performed by: INTERNAL MEDICINE

## 2023-11-13 PROCEDURE — APPSS60 APP SPLIT SHARED TIME 46-60 MINUTES: Performed by: CLINICAL NURSE SPECIALIST

## 2023-11-13 PROCEDURE — 99223 1ST HOSP IP/OBS HIGH 75: CPT | Performed by: INTERNAL MEDICINE

## 2023-11-13 PROCEDURE — 87040 BLOOD CULTURE FOR BACTERIA: CPT

## 2023-11-13 PROCEDURE — 83090 ASSAY OF HOMOCYSTEINE: CPT

## 2023-11-13 PROCEDURE — 6370000000 HC RX 637 (ALT 250 FOR IP): Performed by: CLINICAL NURSE SPECIALIST

## 2023-11-13 PROCEDURE — 6370000000 HC RX 637 (ALT 250 FOR IP)

## 2023-11-13 PROCEDURE — 82248 BILIRUBIN DIRECT: CPT

## 2023-11-13 PROCEDURE — 99291 CRITICAL CARE FIRST HOUR: CPT | Performed by: INTERNAL MEDICINE

## 2023-11-13 PROCEDURE — 6360000004 HC RX CONTRAST MEDICATION: Performed by: INTERNAL MEDICINE

## 2023-11-13 PROCEDURE — 83735 ASSAY OF MAGNESIUM: CPT

## 2023-11-13 PROCEDURE — 71275 CT ANGIOGRAPHY CHEST: CPT

## 2023-11-13 PROCEDURE — 93005 ELECTROCARDIOGRAM TRACING: CPT | Performed by: STUDENT IN AN ORGANIZED HEALTH CARE EDUCATION/TRAINING PROGRAM

## 2023-11-13 PROCEDURE — 71045 X-RAY EXAM CHEST 1 VIEW: CPT

## 2023-11-13 PROCEDURE — 5A09357 ASSISTANCE WITH RESPIRATORY VENTILATION, LESS THAN 24 CONSECUTIVE HOURS, CONTINUOUS POSITIVE AIRWAY PRESSURE: ICD-10-PCS

## 2023-11-13 PROCEDURE — 6360000002 HC RX W HCPCS

## 2023-11-13 PROCEDURE — 81291 MTHFR GENE: CPT

## 2023-11-13 PROCEDURE — 85027 COMPLETE CBC AUTOMATED: CPT

## 2023-11-13 PROCEDURE — 87081 CULTURE SCREEN ONLY: CPT

## 2023-11-13 PROCEDURE — 84145 PROCALCITONIN (PCT): CPT

## 2023-11-13 PROCEDURE — 85730 THROMBOPLASTIN TIME PARTIAL: CPT

## 2023-11-13 PROCEDURE — 51798 US URINE CAPACITY MEASURE: CPT

## 2023-11-13 PROCEDURE — 1200000000 HC SEMI PRIVATE

## 2023-11-13 PROCEDURE — 6370000000 HC RX 637 (ALT 250 FOR IP): Performed by: STUDENT IN AN ORGANIZED HEALTH CARE EDUCATION/TRAINING PROGRAM

## 2023-11-13 PROCEDURE — 93306 TTE W/DOPPLER COMPLETE: CPT | Performed by: INTERNAL MEDICINE

## 2023-11-13 PROCEDURE — 2580000003 HC RX 258: Performed by: STUDENT IN AN ORGANIZED HEALTH CARE EDUCATION/TRAINING PROGRAM

## 2023-11-13 PROCEDURE — 84100 ASSAY OF PHOSPHORUS: CPT

## 2023-11-13 PROCEDURE — 6360000004 HC RX CONTRAST MEDICATION: Performed by: RADIOLOGY

## 2023-11-13 PROCEDURE — 6360000002 HC RX W HCPCS: Performed by: STUDENT IN AN ORGANIZED HEALTH CARE EDUCATION/TRAINING PROGRAM

## 2023-11-13 PROCEDURE — 94640 AIRWAY INHALATION TREATMENT: CPT

## 2023-11-13 RX ORDER — INSULIN LISPRO 100 [IU]/ML
0-8 INJECTION, SOLUTION INTRAVENOUS; SUBCUTANEOUS
Status: DISCONTINUED | OUTPATIENT
Start: 2023-11-13 | End: 2023-11-21 | Stop reason: HOSPADM

## 2023-11-13 RX ORDER — INSULIN GLARGINE 100 [IU]/ML
20 INJECTION, SOLUTION SUBCUTANEOUS NIGHTLY
Status: DISCONTINUED | OUTPATIENT
Start: 2023-11-13 | End: 2023-11-14

## 2023-11-13 RX ORDER — SODIUM CHLORIDE 0.9 % (FLUSH) 0.9 %
5-40 SYRINGE (ML) INJECTION EVERY 12 HOURS SCHEDULED
Status: DISCONTINUED | OUTPATIENT
Start: 2023-11-13 | End: 2023-11-21 | Stop reason: HOSPADM

## 2023-11-13 RX ORDER — HEPARIN SODIUM 1000 [USP'U]/ML
2000 INJECTION, SOLUTION INTRAVENOUS; SUBCUTANEOUS PRN
Status: DISCONTINUED | OUTPATIENT
Start: 2023-11-13 | End: 2023-11-13 | Stop reason: SDUPTHER

## 2023-11-13 RX ORDER — NITROGLYCERIN 20 MG/100ML
5-200 INJECTION INTRAVENOUS CONTINUOUS
Status: DISCONTINUED | OUTPATIENT
Start: 2023-11-13 | End: 2023-11-13 | Stop reason: HOSPADM

## 2023-11-13 RX ORDER — FUROSEMIDE 10 MG/ML
40 INJECTION INTRAMUSCULAR; INTRAVENOUS ONCE
Status: COMPLETED | OUTPATIENT
Start: 2023-11-13 | End: 2023-11-13

## 2023-11-13 RX ORDER — FENTANYL CITRATE 50 UG/ML
INJECTION, SOLUTION INTRAMUSCULAR; INTRAVENOUS
Status: COMPLETED
Start: 2023-11-13 | End: 2023-11-13

## 2023-11-13 RX ORDER — HEPARIN SODIUM 1000 [USP'U]/ML
80 INJECTION, SOLUTION INTRAVENOUS; SUBCUTANEOUS PRN
Status: DISCONTINUED | OUTPATIENT
Start: 2023-11-13 | End: 2023-11-13 | Stop reason: HOSPADM

## 2023-11-13 RX ORDER — SODIUM CHLORIDE 9 MG/ML
INJECTION, SOLUTION INTRAVENOUS PRN
Status: DISCONTINUED | OUTPATIENT
Start: 2023-11-13 | End: 2023-11-21 | Stop reason: HOSPADM

## 2023-11-13 RX ORDER — IPRATROPIUM BROMIDE AND ALBUTEROL SULFATE 2.5; .5 MG/3ML; MG/3ML
3 SOLUTION RESPIRATORY (INHALATION) ONCE
Status: COMPLETED | OUTPATIENT
Start: 2023-11-13 | End: 2023-11-13

## 2023-11-13 RX ORDER — INSULIN LISPRO 100 [IU]/ML
0-4 INJECTION, SOLUTION INTRAVENOUS; SUBCUTANEOUS NIGHTLY
Status: DISCONTINUED | OUTPATIENT
Start: 2023-11-13 | End: 2023-11-21 | Stop reason: HOSPADM

## 2023-11-13 RX ORDER — ATORVASTATIN CALCIUM 40 MG/1
40 TABLET, FILM COATED ORAL NIGHTLY
Status: DISCONTINUED | OUTPATIENT
Start: 2023-11-13 | End: 2023-11-21 | Stop reason: HOSPADM

## 2023-11-13 RX ORDER — HEPARIN SODIUM 10000 [USP'U]/100ML
5-30 INJECTION, SOLUTION INTRAVENOUS CONTINUOUS
Status: DISCONTINUED | OUTPATIENT
Start: 2023-11-13 | End: 2023-11-20

## 2023-11-13 RX ORDER — HEPARIN SODIUM 1000 [USP'U]/ML
40 INJECTION, SOLUTION INTRAVENOUS; SUBCUTANEOUS PRN
Status: DISCONTINUED | OUTPATIENT
Start: 2023-11-13 | End: 2023-11-20

## 2023-11-13 RX ORDER — HEPARIN SODIUM 1000 [USP'U]/ML
4000 INJECTION, SOLUTION INTRAVENOUS; SUBCUTANEOUS PRN
Status: DISCONTINUED | OUTPATIENT
Start: 2023-11-13 | End: 2023-11-13 | Stop reason: SDUPTHER

## 2023-11-13 RX ORDER — LEVOFLOXACIN 5 MG/ML
750 INJECTION, SOLUTION INTRAVENOUS ONCE
Status: DISCONTINUED | OUTPATIENT
Start: 2023-11-13 | End: 2023-11-13 | Stop reason: HOSPADM

## 2023-11-13 RX ORDER — ACETAMINOPHEN 650 MG/1
650 SUPPOSITORY RECTAL EVERY 6 HOURS PRN
Status: DISCONTINUED | OUTPATIENT
Start: 2023-11-13 | End: 2023-11-16 | Stop reason: ALTCHOICE

## 2023-11-13 RX ORDER — ONDANSETRON 2 MG/ML
4 INJECTION INTRAMUSCULAR; INTRAVENOUS EVERY 6 HOURS PRN
Status: DISCONTINUED | OUTPATIENT
Start: 2023-11-13 | End: 2023-11-21 | Stop reason: HOSPADM

## 2023-11-13 RX ORDER — HEPARIN SODIUM 1000 [USP'U]/ML
80 INJECTION, SOLUTION INTRAVENOUS; SUBCUTANEOUS PRN
Status: DISCONTINUED | OUTPATIENT
Start: 2023-11-13 | End: 2023-11-20

## 2023-11-13 RX ORDER — INSULIN LISPRO 100 [IU]/ML
8 INJECTION, SOLUTION INTRAVENOUS; SUBCUTANEOUS ONCE
Status: COMPLETED | OUTPATIENT
Start: 2023-11-13 | End: 2023-11-13

## 2023-11-13 RX ORDER — POLYETHYLENE GLYCOL 3350 17 G/17G
17 POWDER, FOR SOLUTION ORAL DAILY PRN
Status: DISCONTINUED | OUTPATIENT
Start: 2023-11-13 | End: 2023-11-21 | Stop reason: HOSPADM

## 2023-11-13 RX ORDER — INSULIN LISPRO 100 [IU]/ML
0-4 INJECTION, SOLUTION INTRAVENOUS; SUBCUTANEOUS NIGHTLY
Status: DISCONTINUED | OUTPATIENT
Start: 2023-11-13 | End: 2023-11-15 | Stop reason: SDUPTHER

## 2023-11-13 RX ORDER — ONDANSETRON 4 MG/1
4 TABLET, ORALLY DISINTEGRATING ORAL EVERY 8 HOURS PRN
Status: DISCONTINUED | OUTPATIENT
Start: 2023-11-13 | End: 2023-11-21 | Stop reason: HOSPADM

## 2023-11-13 RX ORDER — HEPARIN SODIUM 10000 [USP'U]/100ML
5-30 INJECTION, SOLUTION INTRAVENOUS CONTINUOUS
Status: DISCONTINUED | OUTPATIENT
Start: 2023-11-13 | End: 2023-11-13

## 2023-11-13 RX ORDER — INSULIN LISPRO 100 [IU]/ML
0-4 INJECTION, SOLUTION INTRAVENOUS; SUBCUTANEOUS
Status: DISCONTINUED | OUTPATIENT
Start: 2023-11-13 | End: 2023-11-13

## 2023-11-13 RX ORDER — HEPARIN SODIUM 1000 [USP'U]/ML
80 INJECTION, SOLUTION INTRAVENOUS; SUBCUTANEOUS ONCE
Status: COMPLETED | OUTPATIENT
Start: 2023-11-13 | End: 2023-11-13

## 2023-11-13 RX ORDER — HEPARIN SODIUM 10000 [USP'U]/100ML
5-30 INJECTION, SOLUTION INTRAVENOUS CONTINUOUS
Status: DISCONTINUED | OUTPATIENT
Start: 2023-11-13 | End: 2023-11-13 | Stop reason: HOSPADM

## 2023-11-13 RX ORDER — PANTOPRAZOLE SODIUM 40 MG/10ML
40 INJECTION, POWDER, LYOPHILIZED, FOR SOLUTION INTRAVENOUS ONCE
Status: COMPLETED | OUTPATIENT
Start: 2023-11-13 | End: 2023-11-13

## 2023-11-13 RX ORDER — HEPARIN SODIUM 1000 [USP'U]/ML
40 INJECTION, SOLUTION INTRAVENOUS; SUBCUTANEOUS PRN
Status: DISCONTINUED | OUTPATIENT
Start: 2023-11-13 | End: 2023-11-13 | Stop reason: HOSPADM

## 2023-11-13 RX ORDER — NITROGLYCERIN 20 MG/100ML
5-200 INJECTION INTRAVENOUS CONTINUOUS
Status: DISCONTINUED | OUTPATIENT
Start: 2023-11-13 | End: 2023-11-14

## 2023-11-13 RX ORDER — CARVEDILOL 6.25 MG/1
12.5 TABLET ORAL 2 TIMES DAILY WITH MEALS
Status: DISCONTINUED | OUTPATIENT
Start: 2023-11-13 | End: 2023-11-15

## 2023-11-13 RX ORDER — HYDRALAZINE HYDROCHLORIDE 10 MG/1
10 TABLET, FILM COATED ORAL EVERY 8 HOURS SCHEDULED
Status: DISCONTINUED | OUTPATIENT
Start: 2023-11-13 | End: 2023-11-15

## 2023-11-13 RX ORDER — MAGNESIUM SULFATE IN WATER 40 MG/ML
2000 INJECTION, SOLUTION INTRAVENOUS ONCE
Status: COMPLETED | OUTPATIENT
Start: 2023-11-13 | End: 2023-11-13

## 2023-11-13 RX ORDER — ASPIRIN 81 MG/1
81 TABLET ORAL DAILY
Status: DISCONTINUED | OUTPATIENT
Start: 2023-11-13 | End: 2023-11-16 | Stop reason: CLARIF

## 2023-11-13 RX ORDER — INSULIN GLARGINE 100 [IU]/ML
10 INJECTION, SOLUTION SUBCUTANEOUS ONCE
Status: COMPLETED | OUTPATIENT
Start: 2023-11-13 | End: 2023-11-13

## 2023-11-13 RX ORDER — DEXTROSE MONOHYDRATE 100 MG/ML
INJECTION, SOLUTION INTRAVENOUS CONTINUOUS PRN
Status: DISCONTINUED | OUTPATIENT
Start: 2023-11-13 | End: 2023-11-21 | Stop reason: HOSPADM

## 2023-11-13 RX ORDER — 0.9 % SODIUM CHLORIDE 0.9 %
1000 INTRAVENOUS SOLUTION INTRAVENOUS ONCE
Status: COMPLETED | OUTPATIENT
Start: 2023-11-13 | End: 2023-11-13

## 2023-11-13 RX ORDER — MAGNESIUM SULFATE IN WATER 40 MG/ML
2000 INJECTION, SOLUTION INTRAVENOUS PRN
Status: DISCONTINUED | OUTPATIENT
Start: 2023-11-13 | End: 2023-11-21 | Stop reason: HOSPADM

## 2023-11-13 RX ORDER — BUMETANIDE 0.25 MG/ML
1 INJECTION INTRAMUSCULAR; INTRAVENOUS ONCE
Status: COMPLETED | OUTPATIENT
Start: 2023-11-13 | End: 2023-11-13

## 2023-11-13 RX ORDER — FENTANYL CITRATE 50 UG/ML
50 INJECTION, SOLUTION INTRAMUSCULAR; INTRAVENOUS ONCE
Status: COMPLETED | OUTPATIENT
Start: 2023-11-13 | End: 2023-11-13

## 2023-11-13 RX ORDER — HEPARIN SODIUM 1000 [USP'U]/ML
60 INJECTION, SOLUTION INTRAVENOUS; SUBCUTANEOUS ONCE
Status: DISCONTINUED | OUTPATIENT
Start: 2023-11-13 | End: 2023-11-13

## 2023-11-13 RX ORDER — SODIUM CHLORIDE 0.9 % (FLUSH) 0.9 %
5-40 SYRINGE (ML) INJECTION PRN
Status: DISCONTINUED | OUTPATIENT
Start: 2023-11-13 | End: 2023-11-21 | Stop reason: HOSPADM

## 2023-11-13 RX ORDER — ACETAMINOPHEN 325 MG/1
650 TABLET ORAL EVERY 6 HOURS PRN
Status: DISCONTINUED | OUTPATIENT
Start: 2023-11-13 | End: 2023-11-16 | Stop reason: ALTCHOICE

## 2023-11-13 RX ADMIN — Medication 10 ML: at 08:45

## 2023-11-13 RX ADMIN — HEPARIN SODIUM 18 UNITS/KG/HR: 10000 INJECTION, SOLUTION INTRAVENOUS at 02:06

## 2023-11-13 RX ADMIN — NITROGLYCERIN 100 MCG/MIN: 20 INJECTION INTRAVENOUS at 02:25

## 2023-11-13 RX ADMIN — VANCOMYCIN HYDROCHLORIDE 1250 MG: 10 INJECTION, POWDER, LYOPHILIZED, FOR SOLUTION INTRAVENOUS at 10:25

## 2023-11-13 RX ADMIN — NITROGLYCERIN 60 MCG/MIN: 20 INJECTION INTRAVENOUS at 13:58

## 2023-11-13 RX ADMIN — FENTANYL CITRATE 50 MCG: 50 INJECTION, SOLUTION INTRAMUSCULAR; INTRAVENOUS at 02:12

## 2023-11-13 RX ADMIN — NITROGLYCERIN 90 MCG/MIN: 20 INJECTION INTRAVENOUS at 08:45

## 2023-11-13 RX ADMIN — FUROSEMIDE 40 MG: 10 INJECTION, SOLUTION INTRAMUSCULAR; INTRAVENOUS at 03:13

## 2023-11-13 RX ADMIN — HYDRALAZINE HYDROCHLORIDE 10 MG: 10 TABLET, FILM COATED ORAL at 20:06

## 2023-11-13 RX ADMIN — HEPARIN SODIUM 15 UNITS/KG/HR: 10000 INJECTION, SOLUTION INTRAVENOUS at 19:52

## 2023-11-13 RX ADMIN — INSULIN GLARGINE 20 UNITS: 100 INJECTION, SOLUTION SUBCUTANEOUS at 20:02

## 2023-11-13 RX ADMIN — INSULIN LISPRO 4 UNITS: 100 INJECTION, SOLUTION INTRAVENOUS; SUBCUTANEOUS at 12:16

## 2023-11-13 RX ADMIN — ATORVASTATIN CALCIUM 40 MG: 40 TABLET, FILM COATED ORAL at 19:58

## 2023-11-13 RX ADMIN — SODIUM CHLORIDE 1000 ML: 9 INJECTION, SOLUTION INTRAVENOUS at 02:13

## 2023-11-13 RX ADMIN — CARVEDILOL 12.5 MG: 6.25 TABLET, FILM COATED ORAL at 10:51

## 2023-11-13 RX ADMIN — HEPARIN SODIUM 7260 UNITS: 1000 INJECTION INTRAVENOUS; SUBCUTANEOUS at 02:00

## 2023-11-13 RX ADMIN — INSULIN LISPRO 8 UNITS: 100 INJECTION, SOLUTION INTRAVENOUS; SUBCUTANEOUS at 10:48

## 2023-11-13 RX ADMIN — PERFLUTREN 1.5 ML: 6.52 INJECTION, SUSPENSION INTRAVENOUS at 09:52

## 2023-11-13 RX ADMIN — INSULIN LISPRO 4 UNITS: 100 INJECTION, SOLUTION INTRAVENOUS; SUBCUTANEOUS at 20:06

## 2023-11-13 RX ADMIN — HEPARIN SODIUM 18 UNITS/KG/HR: 10000 INJECTION, SOLUTION INTRAVENOUS at 09:00

## 2023-11-13 RX ADMIN — IOPAMIDOL 75 ML: 755 INJECTION, SOLUTION INTRAVENOUS at 00:59

## 2023-11-13 RX ADMIN — IPRATROPIUM BROMIDE AND ALBUTEROL SULFATE 3 DOSE: .5; 2.5 SOLUTION RESPIRATORY (INHALATION) at 02:10

## 2023-11-13 RX ADMIN — PANTOPRAZOLE SODIUM 40 MG: 40 INJECTION, POWDER, FOR SOLUTION INTRAVENOUS at 15:09

## 2023-11-13 RX ADMIN — ASPIRIN 81 MG: 81 TABLET, COATED ORAL at 12:19

## 2023-11-13 RX ADMIN — INSULIN LISPRO 8 UNITS: 100 INJECTION, SOLUTION INTRAVENOUS; SUBCUTANEOUS at 17:40

## 2023-11-13 RX ADMIN — INSULIN GLARGINE 10 UNITS: 100 INJECTION, SOLUTION SUBCUTANEOUS at 14:39

## 2023-11-13 RX ADMIN — CARVEDILOL 12.5 MG: 6.25 TABLET, FILM COATED ORAL at 17:40

## 2023-11-13 RX ADMIN — BUMETANIDE 1 MG: 0.25 INJECTION INTRAMUSCULAR; INTRAVENOUS at 15:09

## 2023-11-13 RX ADMIN — ACETAMINOPHEN 650 MG: 325 TABLET ORAL at 12:17

## 2023-11-13 RX ADMIN — FUROSEMIDE 40 MG: 10 INJECTION, SOLUTION INTRAMUSCULAR; INTRAVENOUS at 10:51

## 2023-11-13 RX ADMIN — HYDRALAZINE HYDROCHLORIDE 10 MG: 10 TABLET, FILM COATED ORAL at 14:39

## 2023-11-13 RX ADMIN — MAGNESIUM SULFATE HEPTAHYDRATE 2000 MG: 40 INJECTION, SOLUTION INTRAVENOUS at 15:03

## 2023-11-13 ASSESSMENT — PAIN - FUNCTIONAL ASSESSMENT: PAIN_FUNCTIONAL_ASSESSMENT: ACTIVITIES ARE NOT PREVENTED

## 2023-11-13 ASSESSMENT — PAIN SCALES - GENERAL
PAINLEVEL_OUTOF10: 0
PAINLEVEL_OUTOF10: 0
PAINLEVEL_OUTOF10: 7
PAINLEVEL_OUTOF10: 2
PAINLEVEL_OUTOF10: 4

## 2023-11-13 ASSESSMENT — PAIN DESCRIPTION - DESCRIPTORS: DESCRIPTORS: DISCOMFORT;SORE

## 2023-11-13 ASSESSMENT — PAIN DESCRIPTION - ORIENTATION: ORIENTATION: MID

## 2023-11-13 ASSESSMENT — PAIN DESCRIPTION - LOCATION
LOCATION: HEAD
LOCATION: HEAD

## 2023-11-13 NOTE — ED PROVIDER NOTES
Department of Emergency Medicine   ED  Provider Note  Admit Date/RoomTime: 11/12/2023  9:01 PM  ED Room: 20/20          History of Present Illness:    11/12/23, Time: 9:30 PM HARISH Wade is a 70 y.o. female presenting to the ED for complaint of chest pain middle of chest with radiation to the left side of the chest.  Does also elicit some slight shortness of breath with it. Does have a history of previous heart attack feels slightly similar to that. Denies any abdominal pain changes bowel bladder habits. Denies any fevers chills no other acute complaints this time. Review of Systems:   Pertinent positives and negatives are stated within HPI, all other systems reviewed and are negative.        --------------------------------------------- PAST HISTORY ---------------------------------------------  Past Medical History:  has a past medical history of Atrial fibrillation ([address]), CAD (coronary artery disease), CHF (congestive heart failure) ([address]), Chronic back pain, GERD (gastroesophageal reflux disease), Hx of blood clots, Hyperlipidemia, Hypertension, Irritable bowel syndrome, Myocardial infarction ([address]), Neuropathy, Obesity, Osteoarthritis, Renal insufficiency, Restless legs syndrome, and Type II or unspecified type diabetes mellitus without mention of complication, not stated as uncontrolled. Past Surgical History:  has a past surgical history that includes Colonoscopy; Upper gastrointestinal endoscopy; parathyroidectomy; Carpal tunnel release (Bilateral); Coronary angioplasty with stent (2004); Spine surgery; Cataract removal with implant (Right, 12 1 15); Tonsillectomy; Hysterectomy; and Cataract removal with implant (Left, 5 10 16). Social History:  reports that she has quit smoking. She started smoking about 50 years ago. She has never used smokeless tobacco. She reports current alcohol use. She reports that she does not use drugs.     Family History: family history includes

## 2023-11-13 NOTE — PROGRESS NOTES
11/13/23 1140   NIV Type   NIV Started/Stopped On   Equipment Type V60   Mode Bilevel   Mask Type Full face mask   Mask Size Small   Assessment   Pulse 89   Respirations 20   SpO2 98 %   Comfort Level Good   Using Accessory Muscles No   Mask Compliance Good   Skin Assessment Clean, dry, & intact   Skin Protection for O2 Device Yes   Settings/Measurements   PIP Observed 16 cm H20   IPAP 12 cmH20   CPAP/EPAP 6 cmH2O   Vt (Measured) 497 mL   Rate Ordered 14   Insp Rise Time (%) 2 %   FiO2  (S)  75 %   I Time/ I Time % 0.8 s   Minute Volume (L/min) 9.94 Liters   Mask Leak (lpm) 37 lpm   Patient's Home Machine No   Alarm Settings   Alarms On Y   Low Pressure (cmH2O) 8 cmH2O   High Pressure (cmH2O) 25 cmH2O   Apnea (secs) 20 secs   RR Low (bpm) 12   RR High (bpm) 35 br/min       Date: 11/13/2023    Time: 11:44 AM    Patient Placed On BIPAP/CPAP/ Non-Invasive Ventilation? No  Facial area red/color change? No     If YES are Blister/Lesion present? No     BIPAP/CPAP skin barrier? Yes    Skin barrier type:mepilexlite     Comments: Placed on by RN, checked by RT    Luci Dukes.  MARIA E Tavarez RRT-ACCS

## 2023-11-13 NOTE — H&P
antibiotics, Iodine, and Pcn [penicillins]    Social History:   Social History     Socioeconomic History    Marital status:       Spouse name: Not on file    Number of children: Not on file    Years of education: Not on file    Highest education level: Not on file   Occupational History    Not on file   Tobacco Use    Smoking status: Former     Years: 3     Types: Cigarettes     Start date: 10/3/1973    Smokeless tobacco: Never   Substance and Sexual Activity    Alcohol use: Yes     Comment: social    Drug use: No    Sexual activity: Not on file   Other Topics Concern    Not on file   Social History Narrative    Not on file     Social Determinants of Health     Financial Resource Strain: Not on file   Food Insecurity: No Food Insecurity (11/13/2023)    Hunger Vital Sign     Worried About Running Out of Food in the Last Year: Never true     [address] in the Last Year: Never true   Transportation Needs: No Transportation Needs (11/13/2023)    Transportation Problems ([facility])     In the past 12 months, has lack of reliable transportation kept you from medical appointments, meetings, work or from getting things needed for daily living?: Not on file   Physical Activity: Not on file   Stress: Not on file   Social Connections: Not on file   Intimate Partner Violence: Not on file   Housing Stability: [address] [facility]  (11/13/2023)    Housing Stability Vital Sign     Unable to Pay for Housing in the Last Year: No     Number of Places Lived in the Last Year: 1     Unstable Housing in the Last Year: No         Family History:       Problem Relation Age of Onset    Heart Disease Mother     Kidney Disease Mother     Parkinsonism Mother     Diabetes Father     Cancer Father     Birth Defects Sister     High Cholesterol Sister     Arthritis Brother     Birth Defects Brother     Parkinsonism Brother     Depression Sister     Diabetes Sister     Thyroid Cancer Sister     High Cholesterol Sister     Arthritis Sister     Asthma 50.9 ms    MV Max Velocity 2.1 m/s    MV Mean Velocity 1.2 m/s    MV VTI 57.1 cm    MV Area by PHT 4.3 cm2    PV Peak Gradient 5 mmHg    PV Mean Gradient 3 mmHg    PV Max Velocity 1.1 m/s    PV Mean Velocity 0.9 m/s    PV VTI 20.9 cm   POCT Glucose    Collection Time: 11/13/23 10:25 AM   Result Value Ref Range    POC Glucose 434 (H) 74 - 99 mg/dL   POCT Glucose    Collection Time: 11/13/23 12:09 PM   Result Value Ref Range    POC Glucose 423 (H) 74 - 99 mg/dL       No orders to display           ASSESSMENT :      Active Problems:    Acute pulmonary embolism (HCC)  Resolved Problems:    * No resolved hospital problems. *    Elevated troponin  History of coronary artery disease  History of psoriatic arthritis  Morbid obesity  Remote history of arterial clots lower extremities  Recent lumbar fusion  Diabetes mellitus type 2    Plan :  Continue heparin drip  Cardiology following  Await echocardiogram  Ortho spinal to see      Electronically signed by Carrie Cardona MD on 11/13/2023 at 1:26 PM    NOTE: This report was transcribed using voice recognition software.  Every effort was made to ensure accuracy; however, inadvertent transcription errors may be present

## 2023-11-13 NOTE — CARE COORDINATION
Care Coordination Transfer from [facility] Spoke to pt and daughter Catrachita Andrew in Room. Follows with MICHAEL Matthews, uses [facility] and mail order. Lives at home alone, completely independent. Has walker as needed, hx of [facility]. Hx of [facility]. Plan is home at discharge, does not anticipate any home going needs, Daughter can transport. If DME is needed, no preference. The Plan for Transition of Care is related to the following treatment goals: dc    The Patient and/or patient representative refugio Andrew was provided with a choice of provider and agrees   with the discharge plan. [x] Yes [] No    Freedom of choice list was provided with basic dialogue that supports the patient's individualized plan of care/goals, treatment preferences and shares the quality data associated with the providers.  [x] Yes [] No   Electronically signed by Damian Singleton RN on 11/13/2023 at 11:17 AM

## 2023-11-13 NOTE — ED NOTES
Pt arrived on 6L NC ox sat was 95%. Bumped her down to 2l nc satting 94%. Pt is not normally on oxygen.       Polo Camahco RN  11/12/23 9189

## 2023-11-13 NOTE — CONSULTS
disease: she reports having MI in 2004 and having a \"clot removed from RCA\". She had one stent to the RCA and is unsure of where the other is   Paroxysmal atrial fibrillation following her MI. She states she was on Coumadin which was later stopped due to no recurrence  Congestive heart failure following her MI. She denies recurrence. Intolerance to Toprol XL (wheezing): she was changed to diltiazem by Dr. Orvan Oppenheim  Hypertension   Hyperlipidemia  Type II DM  CKD  GERD  Osteoarthritis   Restless leg syndrome  Multiple back surgeries (details unknown)  Bilateral carpal tunnel release  Hysterectomy  Parathyroidectomy       Prior Cardiac Testing:      Not available at this time     Medications Prior to Admit:  Prior to Admission medications    Medication Sig Start Date End Date Taking? Authorizing Provider   TRESIBA FLEXTOUCH 200 UNIT/ML SOPN  11/2/16   Ary Wu MD   HUMALOG KWIKPEN 200 UNIT/ML SOPN pen  11/3/16   Ary Wu MD   Insulin Lispro, Human, (HUMALOG SC) Inject into the skin three times daily Sliding scale    Ary Wu MD   aspirin 81 MG tablet Take 81 mg by mouth nightly 4 tabs    Ary Wu MD   Liraglutide (VICTOZA SC) Inject 1.8 mg into the skin daily    Ary Wu MD   Insulin Detemir (LEVEMIR SC) Inject 64 Units into the skin nightly Sliding scale    Ary Wu MD   atorvastatin (LIPITOR) 40 MG tablet Take 1 tablet by mouth nightly. 6/25/14   Rosales Amezcua MD   gabapentin (NEURONTIN) 300 MG capsule Take 1 capsule by mouth 2 times daily. 6/25/14   Rosales Amezcua MD   omeprazole (PRILOSEC) 20 MG capsule Take 20 mg by mouth daily.     ProviderAry MD   Insulin Syringes, Disposable, U-100 0.5 ML MISC Pt injects 4 x daily 3/28/14   Rosales Amezcua MD   Insulin Syringes, Disposable, U-100 1 ML MISC Pt inject 2 x daily 3/28/14   Rosales Amezcua MD   Insulin Pen Needle (BD PEN NEEDLE LEXIE U/F) 32G X 4 MM MISC Injects 1 x daily 3/28/14   Bharat Dee are equal, round, and reactive to light. Neck: Normal range of motion. Neck supple. No hepatojugular reflux and no JVD present. Carotid bruit is not present. No tracheal deviation present. No thyromegaly present. Cardiovascular: Normal rate, regular rhythm, normal heart sounds and intact distal pulses. Exam reveals no gallop and no friction rub. No murmur heard. Pulmonary/Chest: Effort normal and breath sounds normal. No respiratory distress. No wheezes. No rales. No tenderness. Abdominal: Soft. Bowel sounds are normal. No distension and no mass. No tenderness. No rebound and no guarding. Musculoskeletal: Normal range of motion. No edema and no tenderness. Lymphadenopathy:   No cervical adenopathy. Neurological: Alert and oriented to person, place, and time. Skin: Skin is warm and dry. No rash noted. Not diaphoretic. No erythema. Psychiatric: Normal mood and affect. Behavior is normal.     Echo Summary 11/13/2023:    Left Ventricle: Severely reduced left ventricular systolic function. EF by 2D Simpsons Biplane is 30%. Left ventricle size is normal. Normal wall thickness. Severe global hypokinesis present. Right Ventricle: Normal systolic function. Mitral Valve: MV mean gradient is 7 mmHg. Moderate annular calcification of the mitral valve. Mild to moderate regurgitation. Mild to moderate stenosis noted. MV mean gradient is 7 mmHg. Tricuspid Valve: Normal RVSP. Interatrial Septum: Agitated saline study was positive without provocation. Right to left shunt was noted. Pericardium: No pericardial effusion. Contrast used: Definity. See accompanying documentation for full consult.     ASSESSMENT AND PLAN:  Patient Active Problem List   Diagnosis    Right cataract    Left cataract    Acute pulmonary embolism (HCC)    Unsteadiness on feet    Stage 3b chronic kidney disease (HCC)    Sciatica of right side    Proteinuria    Post-laminectomy syndrome    Obesity    Mixed hyperlipidemia

## 2023-11-13 NOTE — ED NOTES
Radiology Procedure Waiver   Name: Fay Prado  : 1945  MRN: [id number]    Date:  23    Time: 12:51 AM EST    Benefits of immediately proceeding with radiology exam(s) without pre-testing outweigh the risks or are not indicated as specified below and therefore the following is/are being waived:    [x] Benefits of immediate radiology exam(s) outweigh any risk. OR    Pre-exam testing is not indicated for the following reason(s):  [] Pregnancy test   [] Patients LMP on-time and regular.   [] Patient had Tubal Ligation or has other Contraception Device. [] Patient  is Menopausal or Premenarcheal.    [] Patient had Full or Partial Hysterectomy. [] Protocol for CT contrast allegry   [] Patient has tolerated well previously   [] Patient does not have a true allergy    [] MRI Questionnaire     [] BUN/Creatinine   [] Patient age w/no hx of renal dysfunction. [] Patient on Dialysis. [] Recent Normal Labs.   Electronically signed by Marnie Conner MD on 23 at 12:51 AM EST               Marnie Conner MD  23 0590

## 2023-11-13 NOTE — CONSULTS
Bilateral pleural effusions. Mediastinal adenopathy. IMPRESSION:  S/p LLIF L4-5 with previous hardware removal 11/9/23  Left upper lobe PE  Concern for NSTEMI    PLAN:  MICU care   Will continue to follow patient closely  Deep venous thrombosis prophylaxis - IV heparin,  early mobilization, avoid IV heparin bolus if possible will observe closely for post-op hematoma   Post-op restrictions no bending or twisting at the waist, no lifting greater than 10lbs  PT/OT- when stable   Pain Control  Dressing changes daily  Okay to log every 2 hours  Discussed with Dr. Swetha Jacobs  Electronically signed by Talmage Krabbe, PA-C on 11/13/2023 at 8:45 AM      Addendum:    Patient seen and examined by myself 11/13/2023 at 3:15 PM.  Agree with note from physicians assistant Nyla Pfeiffer seen above. Patient awake and alert on BiPAP. Answering questions. Appears comfortable. Daughter sitting in the room with her. Patient denying any significant radicular pain in her legs. Patient transferred last evening due to chest pain. Found to have upper lobe pulmonary embolism. Patient started on IV heparin did receive a bolus. Physical exam awake alert answering questions. Surgical incisions clean dry and intact. Strength 5 5 lower extremity sensation intact light touch. Assessment and plan    Pulmonary embolism postoperatively. Patient on IV heparin drip. She is obviously at risk of postoperative bleeding however treatment of PE in necessary. Would prefer she not receive heparin bolus. We did not expose the epidural space mitigating risk of epidural hematoma. Retroperitoneal bleed would be a concern due to the surgical approach in the retroperitoneum. Closely monitor H&H. IF the  patient can be transitioned to an oral medication such as Coumadin, Eliquis etc. that would be preferable to heparin drip depending on medicine's recommendations. Continue supportive medical care.   Patient can be out of bed

## 2023-11-13 NOTE — CONSULTS
mouth 2 times daily. 6/25/14   Kain Norman MD   omeprazole (PRILOSEC) 20 MG capsule Take 20 mg by mouth daily. Ary Wu MD   Insulin Syringes, Disposable, U-100 0.5 ML MISC Pt injects 4 x daily 3/28/14   Kain Norman MD   Insulin Syringes, Disposable, U-100 1 ML MISC Pt inject 2 x daily 3/28/14   Kain Norman MD   Insulin Pen Needle (BD PEN NEEDLE LEXIE U/F) 32G X 4 MM MISC Injects 1 x daily 3/28/14   Kain Norman MD   lisinopril (PRINIVIL;ZESTRIL) 40 MG tablet Take 20 mg by mouth daily. Ary Wu MD   isosorbide mononitrate (IMDUR) 30 MG CR tablet Take 30 mg by mouth daily. Ary Wu MD   metoprolol (TOPROL-XL) 100 MG XL tablet Take 150 mg by mouth nightly     Ary Wu MD   Probiotic Product (ACIDOPHILUS PROBIOTIC) CAPS capsule Take 1 capsule by mouth daily. Ary Wu MD   fexofenadine (ALLEGRA) 180 MG tablet Take 180 mg by mouth nightly. Ary Wu MD   Multiple Vitamins-Minerals (PRESERVISION AREDS 2) CAPS Take 1 tablet by mouth nightly. Ary Wu MD   Cobalamine Combinations (VITAMIN B12-FOLIC ACID PO) Take 1 capsule by mouth daily. Ary Wu MD   Omega-3 Fatty Acids (OMEGA-3 FISH OIL PO) Take 1 capsule by mouth 2 times daily     Ary Wu MD   Biotin 5000 MCG CAPS Take 1 capsule by mouth daily. Ary Wu MD   Cholecalciferol (VITAMIN D) 2000 UNITS CAPS capsule Take 1 capsule by mouth daily. Ary Wu MD   Coenzyme Q-10 200 MG CAPS Take 1 capsule by mouth every morning. Ary Wu MD   Calcium Citrate-Vitamin D (CITRACAL + D PO) Take 1 tablet by mouth. 400mg/500iu    Ary Wu MD   HYDROcodone-acetaminophen (NORCO) 5-325 MG per tablet Take 1 tablet by mouth every 6 hours as needed.     Ary Wu MD       Allergies:  Doxycycline calcium, Sulfa antibiotics, Iodine, and Pcn [penicillins]    Social History:   TOBACCO: disease, rule out other causes  History of hypertension  History of hyperlipidemia statin  History of type 2 diabetes mellitus, on insulin 40 units at night and low-dose sliding scale  History of CAD s/p 2X stent on 2004  History of lower limb acute ischemia treated with streptokinase in 1970s      Plan:    Continue on heparin drip and nitroglycerin drip  Stat echo and cardiology consult ordered  Follow cardiology recommendation  Pan culture sent, follow results   B/L lower limbs doppler ordered  Resume home medications as indicated  Ortho surgeon from [facility] following  Trend lactate Q 6 hourly   Follow daily labs, CBC, BMP, MG and Phos  Continue on ASA 81 mg daily   NPO for now, POCT BG Q 6 hourly  Continue on Lantus 20 units nightly and LDSSI for now     PT/OT evaluation: No indicated currently  DVT prophylaxis/ GI prophylaxis: Heparin drip/Protonix  Disposition: Admit to MICU    Irene Ansari MD, PGY-2  Attending physician: Dr. Clarisa Malhotra

## 2023-11-13 NOTE — PROGRESS NOTES
11/13/23 0433   Assessment   Pulse (!) 110   SpO2 98 %   Settings/Measurements   FiO2  (S)  80 %  (weaned to 80% at this time)

## 2023-11-13 NOTE — ACP (ADVANCE CARE PLANNING)
Advance Care Planning   Healthcare Decision Maker:    Primary Decision Maker: Chencho Corin - Child - [phone]    Primary Decision Maker: Denilson Chahal - Brother/Sister - [phone]    Click here to complete Healthcare Decision Makers including selection of the Healthcare Decision Maker Relationship (ie \"Primary\").

## 2023-11-13 NOTE — ED NOTES
Called nurse to nurse to [address] [place] [address] Marie for 0905. Informed on the patients status and the ETA with [facility].       Lluvia Carranza RN  11/13/23 5814

## 2023-11-14 ENCOUNTER — APPOINTMENT (OUTPATIENT)
Dept: ULTRASOUND IMAGING | Age: 78
DRG: 321 | End: 2023-11-14
Attending: INTERNAL MEDICINE
Payer: MEDICARE

## 2023-11-14 LAB
ANION GAP SERPL CALCULATED.3IONS-SCNC: 14 MMOL/L (ref 7–16)
ANION GAP SERPL CALCULATED.3IONS-SCNC: 15 MMOL/L (ref 7–16)
BASOPHILS # BLD: 0.02 K/UL (ref 0–0.2)
BASOPHILS NFR BLD: 0 % (ref 0–2)
BNP SERPL-MCNC: ABNORMAL PG/ML (ref 0–450)
BUN SERPL-MCNC: 55 MG/DL (ref 6–23)
BUN SERPL-MCNC: 58 MG/DL (ref 6–23)
CALCIUM SERPL-MCNC: 8.5 MG/DL (ref 8.6–10.2)
CALCIUM SERPL-MCNC: 8.6 MG/DL (ref 8.6–10.2)
CHLORIDE SERPL-SCNC: 102 MMOL/L (ref 98–107)
CHLORIDE SERPL-SCNC: 99 MMOL/L (ref 98–107)
CHOLEST SERPL-MCNC: 133 MG/DL
CO2 SERPL-SCNC: 20 MMOL/L (ref 22–29)
CO2 SERPL-SCNC: 23 MMOL/L (ref 22–29)
CREAT SERPL-MCNC: 1.5 MG/DL (ref 0.5–1)
CREAT SERPL-MCNC: 1.6 MG/DL (ref 0.5–1)
EOSINOPHIL # BLD: 0 K/UL (ref 0.05–0.5)
EOSINOPHILS RELATIVE PERCENT: 0 % (ref 0–6)
ERYTHROCYTE [DISTWIDTH] IN BLOOD BY AUTOMATED COUNT: 13.2 % (ref 11.5–15)
GFR SERPL CREATININE-BSD FRML MDRD: 34 ML/MIN/1.73M2
GFR SERPL CREATININE-BSD FRML MDRD: 35 ML/MIN/1.73M2
GLUCOSE BLD-MCNC: 232 MG/DL (ref 74–99)
GLUCOSE BLD-MCNC: 252 MG/DL (ref 74–99)
GLUCOSE BLD-MCNC: 274 MG/DL (ref 74–99)
GLUCOSE BLD-MCNC: 295 MG/DL (ref 74–99)
GLUCOSE BLD-MCNC: 299 MG/DL (ref 74–99)
GLUCOSE BLD-MCNC: 300 MG/DL (ref 74–99)
GLUCOSE SERPL-MCNC: 254 MG/DL (ref 74–99)
GLUCOSE SERPL-MCNC: 266 MG/DL (ref 74–99)
HBA1C MFR BLD: 7.7 % (ref 4–5.6)
HCT VFR BLD AUTO: 27.6 % (ref 34–48)
HDLC SERPL-MCNC: 36 MG/DL
HGB BLD-MCNC: 8.7 G/DL (ref 11.5–15.5)
IMM GRANULOCYTES # BLD AUTO: 0.25 K/UL (ref 0–0.58)
IMM GRANULOCYTES NFR BLD: 2 % (ref 0–5)
LDLC SERPL CALC-MCNC: 67 MG/DL
LYMPHOCYTES NFR BLD: 1.27 K/UL (ref 1.5–4)
LYMPHOCYTES RELATIVE PERCENT: 8 % (ref 20–42)
MAGNESIUM SERPL-MCNC: 2.4 MG/DL (ref 1.6–2.6)
MAGNESIUM SERPL-MCNC: 2.8 MG/DL (ref 1.6–2.6)
MCH RBC QN AUTO: 29.4 PG (ref 26–35)
MCHC RBC AUTO-ENTMCNC: 31.5 G/DL (ref 32–34.5)
MCV RBC AUTO: 93.2 FL (ref 80–99.9)
MICROORGANISM SPEC CULT: NO GROWTH
MICROORGANISM/AGENT SPEC: ABNORMAL
MONOCYTES NFR BLD: 0.92 K/UL (ref 0.1–0.95)
MONOCYTES NFR BLD: 6 % (ref 2–12)
NEUTROPHILS NFR BLD: 85 % (ref 43–80)
NEUTS SEG NFR BLD: 13.9 K/UL (ref 1.8–7.3)
PARTIAL THROMBOPLASTIN TIME: 43.6 SEC (ref 24.5–35.1)
PARTIAL THROMBOPLASTIN TIME: 48.2 SEC (ref 24.5–35.1)
PARTIAL THROMBOPLASTIN TIME: 56 SEC (ref 24.5–35.1)
PARTIAL THROMBOPLASTIN TIME: 88 SEC (ref 24.5–35.1)
PHOSPHATE SERPL-MCNC: 3.3 MG/DL (ref 2.5–4.5)
PHOSPHATE SERPL-MCNC: 4.1 MG/DL (ref 2.5–4.5)
PLATELET # BLD AUTO: 228 K/UL (ref 130–450)
PMV BLD AUTO: 11.2 FL (ref 7–12)
POTASSIUM SERPL-SCNC: 4.5 MMOL/L (ref 3.5–5)
POTASSIUM SERPL-SCNC: 5.3 MMOL/L (ref 3.5–5)
RBC # BLD AUTO: 2.96 M/UL (ref 3.5–5.5)
SODIUM SERPL-SCNC: 134 MMOL/L (ref 132–146)
SODIUM SERPL-SCNC: 139 MMOL/L (ref 132–146)
SPECIMEN DESCRIPTION: ABNORMAL
SPECIMEN DESCRIPTION: NORMAL
TRIGL SERPL-MCNC: 149 MG/DL
TROPONIN I SERPL HS-MCNC: 829 NG/L (ref 0–9)
VLDLC SERPL CALC-MCNC: 30 MG/DL
WBC OTHER # BLD: 16.4 K/UL (ref 4.5–11.5)

## 2023-11-14 PROCEDURE — 6360000002 HC RX W HCPCS

## 2023-11-14 PROCEDURE — 83735 ASSAY OF MAGNESIUM: CPT

## 2023-11-14 PROCEDURE — 2000000000 HC ICU R&B

## 2023-11-14 PROCEDURE — 84484 ASSAY OF TROPONIN QUANT: CPT

## 2023-11-14 PROCEDURE — 83880 ASSAY OF NATRIURETIC PEPTIDE: CPT

## 2023-11-14 PROCEDURE — 6370000000 HC RX 637 (ALT 250 FOR IP)

## 2023-11-14 PROCEDURE — 99233 SBSQ HOSP IP/OBS HIGH 50: CPT | Performed by: INTERNAL MEDICINE

## 2023-11-14 PROCEDURE — 93970 EXTREMITY STUDY: CPT

## 2023-11-14 PROCEDURE — 84100 ASSAY OF PHOSPHORUS: CPT

## 2023-11-14 PROCEDURE — 82962 GLUCOSE BLOOD TEST: CPT

## 2023-11-14 PROCEDURE — 2580000003 HC RX 258

## 2023-11-14 PROCEDURE — 80048 BASIC METABOLIC PNL TOTAL CA: CPT

## 2023-11-14 PROCEDURE — 2500000003 HC RX 250 WO HCPCS

## 2023-11-14 PROCEDURE — 2700000000 HC OXYGEN THERAPY PER DAY

## 2023-11-14 PROCEDURE — 2500000003 HC RX 250 WO HCPCS: Performed by: INTERNAL MEDICINE

## 2023-11-14 PROCEDURE — 83036 HEMOGLOBIN GLYCOSYLATED A1C: CPT

## 2023-11-14 PROCEDURE — 6370000000 HC RX 637 (ALT 250 FOR IP): Performed by: INTERNAL MEDICINE

## 2023-11-14 PROCEDURE — 85730 THROMBOPLASTIN TIME PARTIAL: CPT

## 2023-11-14 PROCEDURE — 51798 US URINE CAPACITY MEASURE: CPT

## 2023-11-14 PROCEDURE — 85025 COMPLETE CBC W/AUTO DIFF WBC: CPT

## 2023-11-14 PROCEDURE — 80061 LIPID PANEL: CPT

## 2023-11-14 PROCEDURE — 6370000000 HC RX 637 (ALT 250 FOR IP): Performed by: CLINICAL NURSE SPECIALIST

## 2023-11-14 PROCEDURE — 94660 CPAP INITIATION&MGMT: CPT

## 2023-11-14 PROCEDURE — 51702 INSERT TEMP BLADDER CATH: CPT

## 2023-11-14 PROCEDURE — 99291 CRITICAL CARE FIRST HOUR: CPT | Performed by: INTERNAL MEDICINE

## 2023-11-14 RX ORDER — INSULIN LISPRO 100 [IU]/ML
10 INJECTION, SOLUTION INTRAVENOUS; SUBCUTANEOUS ONCE
Status: COMPLETED | OUTPATIENT
Start: 2023-11-14 | End: 2023-11-14

## 2023-11-14 RX ORDER — INSULIN GLARGINE 100 [IU]/ML
15 INJECTION, SOLUTION SUBCUTANEOUS 2 TIMES DAILY
Status: DISCONTINUED | OUTPATIENT
Start: 2023-11-14 | End: 2023-11-15

## 2023-11-14 RX ORDER — INSULIN LISPRO 100 [IU]/ML
6 INJECTION, SOLUTION INTRAVENOUS; SUBCUTANEOUS ONCE
Status: COMPLETED | OUTPATIENT
Start: 2023-11-14 | End: 2023-11-14

## 2023-11-14 RX ORDER — BUMETANIDE 0.25 MG/ML
1 INJECTION INTRAMUSCULAR; INTRAVENOUS ONCE
Status: COMPLETED | OUTPATIENT
Start: 2023-11-14 | End: 2023-11-14

## 2023-11-14 RX ORDER — CALCIUM GLUCONATE 10 MG/ML
1000 INJECTION, SOLUTION INTRAVENOUS ONCE
Status: COMPLETED | OUTPATIENT
Start: 2023-11-14 | End: 2023-11-14

## 2023-11-14 RX ORDER — INSULIN LISPRO 100 [IU]/ML
8 INJECTION, SOLUTION INTRAVENOUS; SUBCUTANEOUS
Status: DISCONTINUED | OUTPATIENT
Start: 2023-11-14 | End: 2023-11-15

## 2023-11-14 RX ADMIN — INSULIN LISPRO 4 UNITS: 100 INJECTION, SOLUTION INTRAVENOUS; SUBCUTANEOUS at 20:46

## 2023-11-14 RX ADMIN — CARVEDILOL 12.5 MG: 6.25 TABLET, FILM COATED ORAL at 09:02

## 2023-11-14 RX ADMIN — CALCIUM GLUCONATE 1000 MG: 10 INJECTION, SOLUTION INTRAVENOUS at 07:08

## 2023-11-14 RX ADMIN — HEPARIN SODIUM 3630 UNITS: 1000 INJECTION INTRAVENOUS; SUBCUTANEOUS at 22:11

## 2023-11-14 RX ADMIN — ASPIRIN 81 MG: 81 TABLET, COATED ORAL at 09:02

## 2023-11-14 RX ADMIN — CARVEDILOL 12.5 MG: 6.25 TABLET, FILM COATED ORAL at 17:10

## 2023-11-14 RX ADMIN — HYDRALAZINE HYDROCHLORIDE 10 MG: 10 TABLET, FILM COATED ORAL at 16:02

## 2023-11-14 RX ADMIN — Medication 5 ML: at 20:47

## 2023-11-14 RX ADMIN — INSULIN LISPRO 8 UNITS: 100 INJECTION, SOLUTION INTRAVENOUS; SUBCUTANEOUS at 12:19

## 2023-11-14 RX ADMIN — INSULIN LISPRO 10 UNITS: 100 INJECTION, SOLUTION INTRAVENOUS; SUBCUTANEOUS at 07:41

## 2023-11-14 RX ADMIN — INSULIN LISPRO 2 UNITS: 100 INJECTION, SOLUTION INTRAVENOUS; SUBCUTANEOUS at 12:20

## 2023-11-14 RX ADMIN — HEPARIN SODIUM 3630 UNITS: 1000 INJECTION INTRAVENOUS; SUBCUTANEOUS at 03:37

## 2023-11-14 RX ADMIN — INSULIN GLARGINE 15 UNITS: 100 INJECTION, SOLUTION SUBCUTANEOUS at 09:02

## 2023-11-14 RX ADMIN — HEPARIN SODIUM 17 UNITS/KG/HR: 10000 INJECTION, SOLUTION INTRAVENOUS at 13:30

## 2023-11-14 RX ADMIN — Medication 10 ML: at 09:02

## 2023-11-14 RX ADMIN — BUMETANIDE 1 MG: 0.25 INJECTION INTRAMUSCULAR; INTRAVENOUS at 16:02

## 2023-11-14 RX ADMIN — INSULIN LISPRO 6 UNITS: 100 INJECTION, SOLUTION INTRAVENOUS; SUBCUTANEOUS at 06:33

## 2023-11-14 RX ADMIN — BUMETANIDE 1 MG: 0.25 INJECTION INTRAMUSCULAR; INTRAVENOUS at 13:53

## 2023-11-14 RX ADMIN — INSULIN LISPRO 4 UNITS: 100 INJECTION, SOLUTION INTRAVENOUS; SUBCUTANEOUS at 17:10

## 2023-11-14 RX ADMIN — ATORVASTATIN CALCIUM 40 MG: 40 TABLET, FILM COATED ORAL at 20:45

## 2023-11-14 RX ADMIN — INSULIN LISPRO 8 UNITS: 100 INJECTION, SOLUTION INTRAVENOUS; SUBCUTANEOUS at 17:10

## 2023-11-14 RX ADMIN — INSULIN GLARGINE 15 UNITS: 100 INJECTION, SOLUTION SUBCUTANEOUS at 20:46

## 2023-11-14 RX ADMIN — HYDRALAZINE HYDROCHLORIDE 10 MG: 10 TABLET, FILM COATED ORAL at 20:45

## 2023-11-14 ASSESSMENT — PAIN SCALES - GENERAL
PAINLEVEL_OUTOF10: 0

## 2023-11-14 NOTE — PLAN OF CARE
Problem: Chronic Conditions and Co-morbidities  Goal: Patient's chronic conditions and co-morbidity symptoms are monitored and maintained or improved  Outcome: Progressing     Problem: Safety - Adult  Goal: Free from fall injury  Outcome: Progressing  Flowsheets (Taken 11/14/2023 1602)  Free From Fall Injury: Instruct family/caregiver on patient safety     Problem: ABCDS Injury Assessment  Goal: Absence of physical injury  Outcome: Progressing  Flowsheets (Taken 11/14/2023 1602)  Absence of Physical Injury: Implement safety measures based on patient assessment     Problem: Pain  Goal: Verbalizes/displays adequate comfort level or baseline comfort level  Outcome: Progressing

## 2023-11-14 NOTE — PROGRESS NOTES
[facility] Cardiology Inpatient Progress Note    Patient is a 70 y.o. female of Aydee Adan MD seen in hospital follow up. Chief complaint: CHF/CP    HPI: Some SOB. No CP.      Patient Active Problem List   Diagnosis    Right cataract    Left cataract    Acute pulmonary embolism (HCC)    Unsteadiness on feet    Stage 3b chronic kidney disease ([address])    Sciatica of right side    Proteinuria    Post-laminectomy syndrome    Obesity    Mixed hyperlipidemia    Lumbosacral radiculopathy    Iron deficiency anemia    Hypomagnesemia    Hyperuricemia    Hypertension    Hyperkalemia    Type 2 diabetes mellitus with chronic kidney disease (HCC)    Disorder of skin    Chronic kidney disease    Benign hypertensive kidney disease with chronic kidney disease    Anemia in chronic kidney disease    Acute renal failure (HCC)    NSTEMI (non-ST elevated myocardial infarction) (HCC)       Allergies   Allergen Reactions    Doxycycline Calcium Anaphylaxis    Sulfa Antibiotics Anaphylaxis    Iodine Hives    Pcn [Penicillins] Rash       Current Facility-Administered Medications   Medication Dose Route Frequency Provider Last Rate Last Admin    insulin glargine (LANTUS) injection vial 15 Units  15 Units SubCUTAneous BID Katie English MD   15 Units at 11/14/23 0902    sodium chloride flush 0.9 % injection 5-40 mL  5-40 mL IntraVENous 2 times per day Katie English MD   10 mL at 11/14/23 0902    sodium chloride flush 0.9 % injection 5-40 mL  5-40 mL IntraVENous PRN Katie English MD        0.9 % sodium chloride infusion   IntraVENous PRN Katie English MD        magnesium sulfate 2000 mg in 50 mL IVPB premix  2,000 mg IntraVENous PRN Katie English MD        ondansetron (ZOFRAN-ODT) disintegrating tablet 4 mg  4 mg Oral Q8H PRN Katie English MD        Or    ondansetron (ZOFRAN) injection 4 mg  4 mg IntraVENous Q6H PRN Katie English MD        polyethylene glycol (GLYCOLAX) packet 17 g  17 g Oral Daily PRN Katie English MD acetaminophen (TYLENOL) tablet 650 mg  650 mg Oral Q6H PRN Albina Staley MD   650 mg at 11/13/23 1217    Or    acetaminophen (TYLENOL) suppository 650 mg  650 mg Rectal Q6H PRN Henry Ribera MD        perflutren lipid microspheres (DEFINITY) injection 1.5 mL  1.5 mL IntraVENous ONCE PRN Albina Staley MD        heparin (porcine) injection 7,260 Units  80 Units/kg IntraVENous PRN Henry Ribera MD        heparin (porcine) injection 3,630 Units  40 Units/kg IntraVENous PRN Albina Staley MD   3,630 Units at 11/14/23 0337    heparin 25,000 units in dextrose 5% 250 mL (premix) infusion  5-30 Units/kg/hr IntraVENous Continuous Henry Ribera MD 15.4 mL/hr at 11/14/23 0914 17 Units/kg/hr at 11/14/23 0914    insulin lispro (HUMALOG) injection vial 0-4 Units  0-4 Units SubCUTAneous Nightly Albina Staley MD        glucose chewable tablet 16 g  4 tablet Oral PRN Henry Ribera MD        dextrose bolus 10% 125 mL  125 mL IntraVENous PRN Albina Staley MD        Or    dextrose bolus 10% 250 mL  250 mL IntraVENous PRN Henry Ribera MD        glucagon injection 1 mg  1 mg SubCUTAneous PRN Henry Ribera MD        dextrose 10 % infusion   IntraVENous Continuous PRN Henry Ribera MD        atorvastatin (LIPITOR) tablet 40 mg  40 mg Oral Nightly Henry Ribera MD   40 mg at 11/13/23 1958    carvedilol (COREG) tablet 12.5 mg  12.5 mg Oral BID  Darryle Base, APRN - CNS   12.5 mg at 11/14/23 7759    aspirin EC tablet 81 mg  81 mg Oral Daily Rd Etienne T, DO   81 mg at 11/14/23 1448    hydrALAZINE (APRESOLINE) tablet 10 mg  10 mg Oral 3 times per day Rd Jaimie T DO   10 mg at 11/13/23 2006    insulin lispro (HUMALOG) injection vial 0-8 Units  0-8 Units SubCUTAneous TID  Henry Ribera MD   8 Units at 11/13/23 1740    insulin lispro (HUMALOG) injection vial 0-4 Units  0-4 Units SubCUTAneous Nightly Albina Staley MD   4 Units at 11/13/23 2006       Review of systems:   Heart: as above   Lungs: as

## 2023-11-15 LAB
ANION GAP SERPL CALCULATED.3IONS-SCNC: 11 MMOL/L (ref 7–16)
ANION GAP SERPL CALCULATED.3IONS-SCNC: 13 MMOL/L (ref 7–16)
BASOPHILS # BLD: 0.05 K/UL (ref 0–0.2)
BASOPHILS NFR BLD: 0 % (ref 0–2)
BUN SERPL-MCNC: 48 MG/DL (ref 6–23)
BUN SERPL-MCNC: 55 MG/DL (ref 6–23)
CALCIUM SERPL-MCNC: 8.3 MG/DL (ref 8.6–10.2)
CALCIUM SERPL-MCNC: 8.6 MG/DL (ref 8.6–10.2)
CHLORIDE SERPL-SCNC: 102 MMOL/L (ref 98–107)
CHLORIDE SERPL-SCNC: 98 MMOL/L (ref 98–107)
CO2 SERPL-SCNC: 23 MMOL/L (ref 22–29)
CO2 SERPL-SCNC: 26 MMOL/L (ref 22–29)
CREAT SERPL-MCNC: 1.3 MG/DL (ref 0.5–1)
CREAT SERPL-MCNC: 1.3 MG/DL (ref 0.5–1)
EOSINOPHIL # BLD: 0.16 K/UL (ref 0.05–0.5)
EOSINOPHILS RELATIVE PERCENT: 1 % (ref 0–6)
ERYTHROCYTE [DISTWIDTH] IN BLOOD BY AUTOMATED COUNT: 13.3 % (ref 11.5–15)
GFR SERPL CREATININE-BSD FRML MDRD: 42 ML/MIN/1.73M2
GFR SERPL CREATININE-BSD FRML MDRD: 43 ML/MIN/1.73M2
GLUCOSE BLD-MCNC: 156 MG/DL (ref 74–99)
GLUCOSE BLD-MCNC: 172 MG/DL (ref 74–99)
GLUCOSE BLD-MCNC: 243 MG/DL (ref 74–99)
GLUCOSE BLD-MCNC: 293 MG/DL (ref 74–99)
GLUCOSE SERPL-MCNC: 147 MG/DL (ref 74–99)
GLUCOSE SERPL-MCNC: 183 MG/DL (ref 74–99)
HCT VFR BLD AUTO: 28.4 % (ref 34–48)
HGB BLD-MCNC: 9.1 G/DL (ref 11.5–15.5)
IMM GRANULOCYTES # BLD AUTO: 0.47 K/UL (ref 0–0.58)
IMM GRANULOCYTES NFR BLD: 3 % (ref 0–5)
LYMPHOCYTES NFR BLD: 2.35 K/UL (ref 1.5–4)
LYMPHOCYTES RELATIVE PERCENT: 16 % (ref 20–42)
MAGNESIUM SERPL-MCNC: 2.2 MG/DL (ref 1.6–2.6)
MAGNESIUM SERPL-MCNC: 2.2 MG/DL (ref 1.6–2.6)
MCH RBC QN AUTO: 29.7 PG (ref 26–35)
MCHC RBC AUTO-ENTMCNC: 32 G/DL (ref 32–34.5)
MCV RBC AUTO: 92.8 FL (ref 80–99.9)
MICROORGANISM SPEC CULT: NORMAL
MONOCYTES NFR BLD: 1.3 K/UL (ref 0.1–0.95)
MONOCYTES NFR BLD: 9 % (ref 2–12)
NEUTROPHILS NFR BLD: 71 % (ref 43–80)
NEUTS SEG NFR BLD: 10.37 K/UL (ref 1.8–7.3)
PARTIAL THROMBOPLASTIN TIME: 62.3 SEC (ref 24.5–35.1)
PARTIAL THROMBOPLASTIN TIME: 63.4 SEC (ref 24.5–35.1)
PARTIAL THROMBOPLASTIN TIME: 71.7 SEC (ref 24.5–35.1)
PHOSPHATE SERPL-MCNC: 3.7 MG/DL (ref 2.5–4.5)
PHOSPHATE SERPL-MCNC: 3.9 MG/DL (ref 2.5–4.5)
PLATELET # BLD AUTO: 254 K/UL (ref 130–450)
PMV BLD AUTO: 11.5 FL (ref 7–12)
POTASSIUM SERPL-SCNC: 4.3 MMOL/L (ref 3.5–5)
POTASSIUM SERPL-SCNC: 4.6 MMOL/L (ref 3.5–5)
RBC # BLD AUTO: 3.06 M/UL (ref 3.5–5.5)
SODIUM SERPL-SCNC: 135 MMOL/L (ref 132–146)
SODIUM SERPL-SCNC: 138 MMOL/L (ref 132–146)
SPECIMEN DESCRIPTION: NORMAL
WBC OTHER # BLD: 14.7 K/UL (ref 4.5–11.5)

## 2023-11-15 PROCEDURE — 2500000003 HC RX 250 WO HCPCS: Performed by: INTERNAL MEDICINE

## 2023-11-15 PROCEDURE — 84100 ASSAY OF PHOSPHORUS: CPT

## 2023-11-15 PROCEDURE — 80048 BASIC METABOLIC PNL TOTAL CA: CPT

## 2023-11-15 PROCEDURE — 36415 COLL VENOUS BLD VENIPUNCTURE: CPT

## 2023-11-15 PROCEDURE — 6370000000 HC RX 637 (ALT 250 FOR IP): Performed by: INTERNAL MEDICINE

## 2023-11-15 PROCEDURE — 94660 CPAP INITIATION&MGMT: CPT

## 2023-11-15 PROCEDURE — 85730 THROMBOPLASTIN TIME PARTIAL: CPT

## 2023-11-15 PROCEDURE — 93005 ELECTROCARDIOGRAM TRACING: CPT

## 2023-11-15 PROCEDURE — 2580000003 HC RX 258

## 2023-11-15 PROCEDURE — 6370000000 HC RX 637 (ALT 250 FOR IP)

## 2023-11-15 PROCEDURE — 87040 BLOOD CULTURE FOR BACTERIA: CPT

## 2023-11-15 PROCEDURE — 2140000000 HC CCU INTERMEDIATE R&B

## 2023-11-15 PROCEDURE — 2700000000 HC OXYGEN THERAPY PER DAY

## 2023-11-15 PROCEDURE — 82962 GLUCOSE BLOOD TEST: CPT

## 2023-11-15 PROCEDURE — 85025 COMPLETE CBC W/AUTO DIFF WBC: CPT

## 2023-11-15 PROCEDURE — 99233 SBSQ HOSP IP/OBS HIGH 50: CPT | Performed by: INTERNAL MEDICINE

## 2023-11-15 PROCEDURE — 83735 ASSAY OF MAGNESIUM: CPT

## 2023-11-15 PROCEDURE — 6360000002 HC RX W HCPCS

## 2023-11-15 RX ORDER — BUMETANIDE 0.25 MG/ML
1 INJECTION INTRAMUSCULAR; INTRAVENOUS 2 TIMES DAILY
Status: DISCONTINUED | OUTPATIENT
Start: 2023-11-15 | End: 2023-11-17

## 2023-11-15 RX ORDER — INSULIN LISPRO 100 [IU]/ML
10 INJECTION, SOLUTION INTRAVENOUS; SUBCUTANEOUS
Status: DISCONTINUED | OUTPATIENT
Start: 2023-11-15 | End: 2023-11-15

## 2023-11-15 RX ORDER — INSULIN LISPRO 100 [IU]/ML
8 INJECTION, SOLUTION INTRAVENOUS; SUBCUTANEOUS
Status: DISCONTINUED | OUTPATIENT
Start: 2023-11-15 | End: 2023-11-21 | Stop reason: HOSPADM

## 2023-11-15 RX ORDER — CARVEDILOL 6.25 MG/1
18.75 TABLET ORAL 2 TIMES DAILY WITH MEALS
Status: DISCONTINUED | OUTPATIENT
Start: 2023-11-15 | End: 2023-11-20

## 2023-11-15 RX ORDER — HYDRALAZINE HYDROCHLORIDE 25 MG/1
25 TABLET, FILM COATED ORAL EVERY 8 HOURS SCHEDULED
Status: DISCONTINUED | OUTPATIENT
Start: 2023-11-15 | End: 2023-11-21 | Stop reason: HOSPADM

## 2023-11-15 RX ORDER — INSULIN GLARGINE 100 [IU]/ML
10 INJECTION, SOLUTION SUBCUTANEOUS 2 TIMES DAILY
Status: DISCONTINUED | OUTPATIENT
Start: 2023-11-15 | End: 2023-11-21 | Stop reason: HOSPADM

## 2023-11-15 RX ORDER — INSULIN GLARGINE 100 [IU]/ML
20 INJECTION, SOLUTION SUBCUTANEOUS 2 TIMES DAILY
Status: DISCONTINUED | OUTPATIENT
Start: 2023-11-15 | End: 2023-11-15

## 2023-11-15 RX ADMIN — BUMETANIDE 1 MG: 0.25 INJECTION INTRAMUSCULAR; INTRAVENOUS at 20:52

## 2023-11-15 RX ADMIN — POLYETHYLENE GLYCOL 3350 17 G: 17 POWDER, FOR SOLUTION ORAL at 16:14

## 2023-11-15 RX ADMIN — ASPIRIN 81 MG: 81 TABLET, COATED ORAL at 08:47

## 2023-11-15 RX ADMIN — HYDRALAZINE HYDROCHLORIDE 25 MG: 25 TABLET, FILM COATED ORAL at 13:22

## 2023-11-15 RX ADMIN — CARVEDILOL 18.75 MG: 6.25 TABLET, FILM COATED ORAL at 16:58

## 2023-11-15 RX ADMIN — INSULIN LISPRO 2 UNITS: 100 INJECTION, SOLUTION INTRAVENOUS; SUBCUTANEOUS at 13:16

## 2023-11-15 RX ADMIN — HYDRALAZINE HYDROCHLORIDE 10 MG: 10 TABLET, FILM COATED ORAL at 06:47

## 2023-11-15 RX ADMIN — HEPARIN SODIUM 17 UNITS/KG/HR: 10000 INJECTION, SOLUTION INTRAVENOUS at 06:25

## 2023-11-15 RX ADMIN — INSULIN GLARGINE 10 UNITS: 100 INJECTION, SOLUTION SUBCUTANEOUS at 20:52

## 2023-11-15 RX ADMIN — BUMETANIDE 1 MG: 0.25 INJECTION INTRAMUSCULAR; INTRAVENOUS at 08:37

## 2023-11-15 RX ADMIN — HYDRALAZINE HYDROCHLORIDE 25 MG: 25 TABLET, FILM COATED ORAL at 20:52

## 2023-11-15 RX ADMIN — ATORVASTATIN CALCIUM 40 MG: 40 TABLET, FILM COATED ORAL at 20:52

## 2023-11-15 RX ADMIN — Medication 10 ML: at 20:53

## 2023-11-15 RX ADMIN — Medication 10 ML: at 08:24

## 2023-11-15 RX ADMIN — INSULIN GLARGINE 20 UNITS: 100 INJECTION, SOLUTION SUBCUTANEOUS at 09:25

## 2023-11-15 RX ADMIN — INSULIN LISPRO 8 UNITS: 100 INJECTION, SOLUTION INTRAVENOUS; SUBCUTANEOUS at 13:17

## 2023-11-15 ASSESSMENT — PAIN SCALES - GENERAL
PAINLEVEL_OUTOF10: 0

## 2023-11-15 NOTE — PROGRESS NOTES
Pharmacy Consultation Note  (Warfarin Dosing and Monitoring)    Initial consult date: 11/15/23  Consulting Provider: Dr. Megan Cortez is a 70 y.o. female for whom pharmacy has been asked to manage warfarin therapy. Weight:   Wt Readings from Last 1 Encounters:   11/15/23 98.1 kg (216 lb 3.2 oz)       TSH:    Lab Results   Component Value Date/Time    TSH 1.570 12/28/2020 04:38 PM       Hepatic Function Panel:                            Lab Results   Component Value Date/Time    ALKPHOS 69 11/13/2023 08:37 AM    ALT 10 11/13/2023 08:37 AM    AST 26 11/13/2023 08:37 AM    PROT 6.2 11/13/2023 08:37 AM    BILITOT 0.6 11/13/2023 08:37 AM    BILIDIR 0.2 11/13/2023 08:37 AM    IBILI 0.4 11/13/2023 08:37 AM    LABALBU 2.8 11/13/2023 08:37 AM       Current warfarin drug-drug interactions include: No significant interactions    Recent Labs     11/13/23  0837 11/14/23  0423 11/15/23  0631   HGB 8.7* 8.7* 9.1*    228 254     Date Warfarin Dose INR Heparin or LMWH Comment   11/15 Hold for planned cath tomorrow - Heparin drip                                  Assessment:  Patient is a 70 y.o. female on warfarin for New Pulmonary Embolism s/p recent LLIF of L4-5 with previous hardware removal  Per cardiology's note, warfarin preferred considering history of MS and ? PAF if evident (cath plan 11/16). 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     Goal INR 2 - 3    Plan:  Hold warfarin tonight for possible cath tomorrow - heparin to continue  Daily PT/INR until the INR is stable within the therapeutic range  Pharmacist will follow and monitor/adjust dosing as necessary    Thank you for this consult,  Hedy Stevenson, HuberD, BCPS, BCCCP 11/15/2023 5:05 PM

## 2023-11-15 NOTE — PLAN OF CARE
Patient had a recent back surgery in [address] [place] [address]  During the current admission in MICU [facility] and the patient had multiple episodes of urinary retention and needed straight cath for drainage. Castillo catheterization was done yesterday for proper monitoring of I's and O's and urine retention. The patient denied of any back pain and has no visible swelling on examination. On examination there is no tenderness in the wound sites look clean without any drainage. Discussed with the orthopedic surgeons from [facility] regarding the issue.

## 2023-11-15 NOTE — CARE COORDINATION
Care Coordination:  LOS 2 day,  4-6 ltr nc, Heparin gtt- cardiology following for  NSTEMI,  CHF with severe LV dysfunction. Pt was a transfer from [facility]. POD 6 s/p LLIF L4-5 with previous hardware removal. Orthopedics following. Would benefit from therapy evals when medically appropriate. Plan per daughter Bel Terrell is Home. Pt was independent pta and lives alone, daughter to transport.  Will follow for transition of care needs    Electronically signed by Jovi Martinez RN on 11/15/2023 at 2:47 PM mild

## 2023-11-15 NOTE — DISCHARGE INSTRUCTIONS
Ortho Spine Discharge Instructions:  Lumbar Decompression (bilateral)/Fusion/Interbody fusion  Post-Operative Instructions:    Wound Care: Your dressing will be changed in the hospital prior to being discharged home  At home change your dressing DAILY- apply a dry sterile dressing, change more frequently if the dressing becomes soiled or wet  Keep the dressing as well as incision clean and dry, do not apply lotion or ointment over incision  Leave Steri-Strips in place over the incision ( steri strips may fall off on their own), these will be removed at your post-operative appointment  You may shower following surgery if you place a waterproof dressing over the incision (can purchase waterproof dressing at the pharmacy)   Okay to shower directly over the incision once incision appears healed and no drainage is appreciated from the incision for 48 hours   Use soap and water only over incision, pat incision dry following bathing  DO NOT soak or submerge incision (no baths) until authorized by physician  Inspect the incision daily for signs of infection (redness, warmth, foul-smelling drainage)  Apply ice pack to the postoperative area for no more than 20 minutes at a time with the dressing covered to avoid saturation  Apply ice 20 minutes on and 2 hours off as needed to help with postoperative swelling, and inflammation    Medications:    Avoid taking medications on an empty stomach.   Take medication only as prescribed by your physician, pain medication will be weaned appropriately   Okay to take Tylenol for breakthrough pain do not exceed 4000 mg of Tylenol daily   No aspirin, aspirin products or blood thinning medications until approved by your surgeon  No NSAIDs or anti-inflammatory medications (Aleve, Mobic, ibuprofen, etc) , hold 2 weeks postoperatively unless otherwise advised  Do not drink alcohol, do not drive, do not operate complicated or heavy machinery, do not sign legally binding documents - while Cardiology Services  Oued Syed, [address]                                                                              [address] [facility] [address] [address] [facility], [address] [phone]                                                                                         [facility]  Hours: M/W/F 7am-7pm & T/Th 7am-12pm                                                  [phone]                                                                                                                          [phone] Jacobsde[facility]  Cardiac Rehab  [address] [place] [address]  P- [phone]                                                                                         Cardiac Rehabilitation  Hours: M/W/F 7am-6pm                                                                                [address], [address] [facility]                                                          [phone]  Cardiac Rehabilitation                                                                                   [phone]  [address] [facility], [address] [place] [address]  [phone]                                                                                          Cardiac Rehabilitation  [phone]                                                                                          [address] [facility].  [address]

## 2023-11-15 NOTE — PLAN OF CARE
Problem: Chronic Conditions and Co-morbidities  Goal: Patient's chronic conditions and co-morbidity symptoms are monitored and maintained or improved  11/15/2023 1840 by Deisy Alvarez RN  Outcome: Progressing  11/15/2023 1840 by Deisy Alvarez RN  Outcome: Progressing     Problem: Discharge Planning  Goal: Discharge to home or other facility with appropriate resources  11/15/2023 1840 by Deisy Alvarez RN  Outcome: Progressing  11/15/2023 1840 by Deisy Alvarez RN  Outcome: Progressing     Problem: Safety - Adult  Goal: Free from fall injury  11/15/2023 1840 by Deisy Alvarez RN  Outcome: Progressing  11/15/2023 1840 by Deisy Alvarez RN  Outcome: Progressing  Flowsheets  Taken 11/15/2023 1500  Free From Fall Injury:   Instruct family/caregiver on patient safety   Based on caregiver fall risk screen, instruct family/caregiver to ask for assistance with transferring infant if caregiver noted to have fall risk factors  Taken 11/15/2023 0800  Free From Fall Injury:   Instruct family/caregiver on patient safety   Based on caregiver fall risk screen, instruct family/caregiver to ask for assistance with transferring infant if caregiver noted to have fall risk factors     Problem: ABCDS Injury Assessment  Goal: Absence of physical injury  11/15/2023 1840 by Deisy Alvarez RN  Outcome: Progressing  11/15/2023 1840 by Deisy Alvarez RN  Outcome: Progressing  Flowsheets (Taken 11/15/2023 0800)  Absence of Physical Injury: Implement safety measures based on patient assessment     Problem: Pain  Goal: Verbalizes/displays adequate comfort level or baseline comfort level  Outcome: Progressing  Flowsheets  Taken 11/15/2023 1800  Verbalizes/displays adequate comfort level or baseline comfort level:   Assess pain using appropriate pain scale   Administer analgesics based on type and severity of pain and evaluate response   Encourage patient to monitor pain and request assistance  Taken 11/15/2023

## 2023-11-15 NOTE — PROGRESS NOTES
[facility] Cardiology Inpatient Progress Note    Patient is a 70 y.o. female of Mac Talbert MD seen in hospital follow up. Chief complaint: CHF/CP    HPI: Some SOB. No CP.      Patient Active Problem List   Diagnosis    Right cataract    Left cataract    Acute pulmonary embolism (HCC)    Unsteadiness on feet    Stage 3b chronic kidney disease ([address])    Sciatica of right side    Proteinuria    Post-laminectomy syndrome    Obesity    Mixed hyperlipidemia    Lumbosacral radiculopathy    Iron deficiency anemia    Hypomagnesemia    Hyperuricemia    Hypertension    Hyperkalemia    Type 2 diabetes mellitus with chronic kidney disease (HCC)    Disorder of skin    Chronic kidney disease    Benign hypertensive kidney disease with chronic kidney disease    Anemia in chronic kidney disease    Acute renal failure (HCC)    NSTEMI (non-ST elevated myocardial infarction) (HCC)       Allergies   Allergen Reactions    Doxycycline Calcium Anaphylaxis    Sulfa Antibiotics Anaphylaxis    Iodine Hives    Pcn [Penicillins] Rash       Current Facility-Administered Medications   Medication Dose Route Frequency Provider Last Rate Last Admin    insulin glargine (LANTUS) injection vial 20 Units  20 Units SubCUTAneous BID Gisella Parker MD        insulin lispro (HUMALOG) injection vial 10 Units  10 Units SubCUTAneous TID  Henry Ribera MD        sodium chloride flush 0.9 % injection 5-40 mL  5-40 mL IntraVENous 2 times per day Gisella Parker MD   5 mL at 11/14/23 2047    sodium chloride flush 0.9 % injection 5-40 mL  5-40 mL IntraVENous PRN Gisella Parker MD        0.9 % sodium chloride infusion   IntraVENous PRN Gisella Parker MD        magnesium sulfate 2000 mg in 50 mL IVPB premix  2,000 mg IntraVENous PRN Gisella Parker MD        ondansetron (ZOFRAN-ODT) disintegrating tablet 4 mg  4 mg Oral Q8H PRN Gisella Parker MD        Or    ondansetron (ZOFRAN) injection 4 mg  4 mg IntraVENous Q6H PRN Gisella Parker MD

## 2023-11-15 NOTE — PROGRESS NOTES
Charge nurse Argelia Mendoza aware of patient being transfer with matta for retention and being Diuresis with Bumex IV pushes. Report given.

## 2023-11-16 LAB
ABO + RH BLD: NORMAL
ACTIVATED CLOTTING TIME, LOW RANGE: 254 SEC
ACTIVATED CLOTTING TIME, LOW RANGE: >400 SEC
ANION GAP SERPL CALCULATED.3IONS-SCNC: 14 MMOL/L (ref 7–16)
ARM BAND NUMBER: NORMAL
BASOPHILS # BLD: 0 K/UL (ref 0–0.2)
BASOPHILS NFR BLD: 0 % (ref 0–2)
BLOOD BANK SAMPLE EXPIRATION: NORMAL
BLOOD GROUP ANTIBODIES SERPL: NEGATIVE
BUN SERPL-MCNC: 44 MG/DL (ref 6–23)
CALCIUM SERPL-MCNC: 8.5 MG/DL (ref 8.6–10.2)
CHLORIDE SERPL-SCNC: 100 MMOL/L (ref 98–107)
CO2 SERPL-SCNC: 25 MMOL/L (ref 22–29)
CREAT SERPL-MCNC: 1.1 MG/DL (ref 0.5–1)
EKG ATRIAL RATE: 66 BPM
EKG ATRIAL RATE: 71 BPM
EKG P AXIS: 45 DEGREES
EKG P AXIS: 49 DEGREES
EKG P-R INTERVAL: 138 MS
EKG P-R INTERVAL: 142 MS
EKG Q-T INTERVAL: 342 MS
EKG Q-T INTERVAL: 452 MS
EKG QRS DURATION: 100 MS
EKG QRS DURATION: 100 MS
EKG QTC CALCULATION (BAZETT): 371 MS
EKG QTC CALCULATION (BAZETT): 473 MS
EKG R AXIS: -2 DEGREES
EKG R AXIS: -4 DEGREES
EKG T AXIS: 135 DEGREES
EKG T AXIS: 154 DEGREES
EKG VENTRICULAR RATE: 66 BPM
EKG VENTRICULAR RATE: 71 BPM
EOSINOPHIL # BLD: 0.67 K/UL (ref 0.05–0.5)
EOSINOPHILS RELATIVE PERCENT: 4 % (ref 0–6)
ERYTHROCYTE [DISTWIDTH] IN BLOOD BY AUTOMATED COUNT: 13.1 % (ref 11.5–15)
GFR SERPL CREATININE-BSD FRML MDRD: 53 ML/MIN/1.73M2
GLUCOSE BLD-MCNC: 277 MG/DL (ref 74–99)
GLUCOSE BLD-MCNC: 286 MG/DL (ref 74–99)
GLUCOSE BLD-MCNC: 322 MG/DL (ref 74–99)
GLUCOSE BLD-MCNC: 323 MG/DL (ref 74–99)
GLUCOSE BLD-MCNC: 351 MG/DL (ref 74–99)
GLUCOSE SERPL-MCNC: 203 MG/DL (ref 74–99)
HCT VFR BLD AUTO: 31.6 % (ref 34–48)
HGB BLD-MCNC: 10.2 G/DL (ref 11.5–15.5)
INR PPP: 1.3
LYMPHOCYTES NFR BLD: 2.28 K/UL (ref 1.5–4)
LYMPHOCYTES RELATIVE PERCENT: 15 % (ref 20–42)
MAGNESIUM SERPL-MCNC: 2 MG/DL (ref 1.6–2.6)
MCH RBC QN AUTO: 29.1 PG (ref 26–35)
MCHC RBC AUTO-ENTMCNC: 32.3 G/DL (ref 32–34.5)
MCV RBC AUTO: 90.3 FL (ref 80–99.9)
MICROORGANISM SPEC CULT: NORMAL
MICROORGANISM SPEC CULT: NORMAL
MONOCYTES NFR BLD: 0.94 K/UL (ref 0.1–0.95)
MONOCYTES NFR BLD: 6 % (ref 2–12)
NEUTROPHILS NFR BLD: 75 % (ref 43–80)
NEUTS SEG NFR BLD: 11.41 K/UL (ref 1.8–7.3)
PARTIAL THROMBOPLASTIN TIME: 28 SEC (ref 24.5–35.1)
PARTIAL THROMBOPLASTIN TIME: 45.2 SEC (ref 24.5–35.1)
PARTIAL THROMBOPLASTIN TIME: 52.8 SEC (ref 24.5–35.1)
PHOSPHATE SERPL-MCNC: 4.1 MG/DL (ref 2.5–4.5)
PLATELET # BLD AUTO: 297 K/UL (ref 130–450)
PMV BLD AUTO: 10.8 FL (ref 7–12)
POTASSIUM SERPL-SCNC: 4.4 MMOL/L (ref 3.5–5)
PROTHROMBIN TIME: 14.1 SEC (ref 9.3–12.4)
RBC # BLD AUTO: 3.5 M/UL (ref 3.5–5.5)
RBC # BLD: ABNORMAL 10*6/UL
SERVICE CMNT-IMP: NORMAL
SERVICE CMNT-IMP: NORMAL
SODIUM SERPL-SCNC: 139 MMOL/L (ref 132–146)
SPECIMEN DESCRIPTION: NORMAL
SPECIMEN DESCRIPTION: NORMAL
WBC OTHER # BLD: 15.3 K/UL (ref 4.5–11.5)

## 2023-11-16 PROCEDURE — 6360000002 HC RX W HCPCS: Performed by: INTERNAL MEDICINE

## 2023-11-16 PROCEDURE — 6370000000 HC RX 637 (ALT 250 FOR IP): Performed by: INTERNAL MEDICINE

## 2023-11-16 PROCEDURE — 027034Z DILATION OF CORONARY ARTERY, ONE ARTERY WITH DRUG-ELUTING INTRALUMINAL DEVICE, PERCUTANEOUS APPROACH: ICD-10-PCS | Performed by: INTERNAL MEDICINE

## 2023-11-16 PROCEDURE — 2580000003 HC RX 258

## 2023-11-16 PROCEDURE — C1769 GUIDE WIRE: HCPCS | Performed by: INTERNAL MEDICINE

## 2023-11-16 PROCEDURE — C1894 INTRO/SHEATH, NON-LASER: HCPCS | Performed by: INTERNAL MEDICINE

## 2023-11-16 PROCEDURE — 99152 MOD SED SAME PHYS/QHP 5/>YRS: CPT | Performed by: INTERNAL MEDICINE

## 2023-11-16 PROCEDURE — 86850 RBC ANTIBODY SCREEN: CPT

## 2023-11-16 PROCEDURE — 6370000000 HC RX 637 (ALT 250 FOR IP)

## 2023-11-16 PROCEDURE — 4A023N8 MEASUREMENT OF CARDIAC SAMPLING AND PRESSURE, BILATERAL, PERCUTANEOUS APPROACH: ICD-10-PCS | Performed by: INTERNAL MEDICINE

## 2023-11-16 PROCEDURE — 85610 PROTHROMBIN TIME: CPT

## 2023-11-16 PROCEDURE — 2500000003 HC RX 250 WO HCPCS: Performed by: INTERNAL MEDICINE

## 2023-11-16 PROCEDURE — 93452 LEFT HRT CATH W/VENTRCLGRPHY: CPT | Performed by: INTERNAL MEDICINE

## 2023-11-16 PROCEDURE — 86901 BLOOD TYPING SEROLOGIC RH(D): CPT

## 2023-11-16 PROCEDURE — 6360000002 HC RX W HCPCS

## 2023-11-16 PROCEDURE — 93010 ELECTROCARDIOGRAM REPORT: CPT | Performed by: INTERNAL MEDICINE

## 2023-11-16 PROCEDURE — 6370000000 HC RX 637 (ALT 250 FOR IP): Performed by: CLINICAL NURSE SPECIALIST

## 2023-11-16 PROCEDURE — 99233 SBSQ HOSP IP/OBS HIGH 50: CPT | Performed by: INTERNAL MEDICINE

## 2023-11-16 PROCEDURE — C1874 STENT, COATED/COV W/DEL SYS: HCPCS | Performed by: INTERNAL MEDICINE

## 2023-11-16 PROCEDURE — 86900 BLOOD TYPING SEROLOGIC ABO: CPT

## 2023-11-16 PROCEDURE — 36415 COLL VENOUS BLD VENIPUNCTURE: CPT

## 2023-11-16 PROCEDURE — 82962 GLUCOSE BLOOD TEST: CPT

## 2023-11-16 PROCEDURE — 2700000000 HC OXYGEN THERAPY PER DAY

## 2023-11-16 PROCEDURE — 85730 THROMBOPLASTIN TIME PARTIAL: CPT

## 2023-11-16 PROCEDURE — 92943 PRQ TRLUML REVSC CH OCC ANT: CPT | Performed by: INTERNAL MEDICINE

## 2023-11-16 PROCEDURE — C1887 CATHETER, GUIDING: HCPCS | Performed by: INTERNAL MEDICINE

## 2023-11-16 PROCEDURE — C9600 PERC DRUG-EL COR STENT SING: HCPCS | Performed by: INTERNAL MEDICINE

## 2023-11-16 PROCEDURE — 93458 L HRT ARTERY/VENTRICLE ANGIO: CPT | Performed by: INTERNAL MEDICINE

## 2023-11-16 PROCEDURE — 93005 ELECTROCARDIOGRAM TRACING: CPT | Performed by: CLINICAL NURSE SPECIALIST

## 2023-11-16 PROCEDURE — 85025 COMPLETE CBC W/AUTO DIFF WBC: CPT

## 2023-11-16 PROCEDURE — 84100 ASSAY OF PHOSPHORUS: CPT

## 2023-11-16 PROCEDURE — 6360000004 HC RX CONTRAST MEDICATION: Performed by: INTERNAL MEDICINE

## 2023-11-16 PROCEDURE — 2580000003 HC RX 258: Performed by: INTERNAL MEDICINE

## 2023-11-16 PROCEDURE — 7100000010 HC PHASE II RECOVERY - FIRST 15 MIN: Performed by: INTERNAL MEDICINE

## 2023-11-16 PROCEDURE — 80048 BASIC METABOLIC PNL TOTAL CA: CPT

## 2023-11-16 PROCEDURE — 2709999900 HC NON-CHARGEABLE SUPPLY: Performed by: INTERNAL MEDICINE

## 2023-11-16 PROCEDURE — B2011ZZ PLAIN RADIOGRAPHY OF MULTIPLE CORONARY ARTERIES USING LOW OSMOLAR CONTRAST: ICD-10-PCS | Performed by: INTERNAL MEDICINE

## 2023-11-16 PROCEDURE — 83735 ASSAY OF MAGNESIUM: CPT

## 2023-11-16 PROCEDURE — 2140000000 HC CCU INTERMEDIATE R&B

## 2023-11-16 PROCEDURE — C1725 CATH, TRANSLUMIN NON-LASER: HCPCS | Performed by: INTERNAL MEDICINE

## 2023-11-16 PROCEDURE — 85347 COAGULATION TIME ACTIVATED: CPT

## 2023-11-16 DEVICE — STENT CORONARY ONYX FRONTIER RX 2.75X15 MM ZOTAROLIMUS ELUTE: Type: IMPLANTABLE DEVICE | Status: FUNCTIONAL

## 2023-11-16 RX ORDER — NITROGLYCERIN 20 MG/100ML
INJECTION INTRAVENOUS CONTINUOUS PRN
Status: COMPLETED | OUTPATIENT
Start: 2023-11-16 | End: 2023-11-16

## 2023-11-16 RX ORDER — CLOPIDOGREL BISULFATE 75 MG/1
75 TABLET ORAL DAILY
Status: DISCONTINUED | OUTPATIENT
Start: 2023-11-17 | End: 2023-11-21 | Stop reason: HOSPADM

## 2023-11-16 RX ORDER — CLOPIDOGREL BISULFATE 75 MG/1
75 TABLET ORAL DAILY
Status: DISCONTINUED | OUTPATIENT
Start: 2023-11-16 | End: 2023-11-16 | Stop reason: CLARIF

## 2023-11-16 RX ORDER — NITROGLYCERIN 0.4 MG/1
0.4 TABLET SUBLINGUAL ONCE
Status: COMPLETED | OUTPATIENT
Start: 2023-11-16 | End: 2023-11-16

## 2023-11-16 RX ORDER — HYDROCODONE BITARTRATE AND ACETAMINOPHEN 5; 325 MG/1; MG/1
1 TABLET ORAL EVERY 6 HOURS PRN
Status: DISCONTINUED | OUTPATIENT
Start: 2023-11-16 | End: 2023-11-21 | Stop reason: HOSPADM

## 2023-11-16 RX ORDER — SODIUM CHLORIDE 9 MG/ML
INJECTION, SOLUTION INTRAVENOUS CONTINUOUS
Status: ACTIVE | OUTPATIENT
Start: 2023-11-16 | End: 2023-11-16

## 2023-11-16 RX ORDER — FENTANYL CITRATE 50 UG/ML
INJECTION, SOLUTION INTRAMUSCULAR; INTRAVENOUS PRN
Status: DISCONTINUED | OUTPATIENT
Start: 2023-11-16 | End: 2023-11-16 | Stop reason: HOSPADM

## 2023-11-16 RX ORDER — ASPIRIN 81 MG/1
81 TABLET ORAL DAILY
Status: DISCONTINUED | OUTPATIENT
Start: 2023-11-17 | End: 2023-11-21 | Stop reason: HOSPADM

## 2023-11-16 RX ORDER — VERAPAMIL HYDROCHLORIDE 2.5 MG/ML
INJECTION, SOLUTION INTRAVENOUS PRN
Status: DISCONTINUED | OUTPATIENT
Start: 2023-11-16 | End: 2023-11-16 | Stop reason: HOSPADM

## 2023-11-16 RX ORDER — WARFARIN SODIUM 5 MG/1
2.5 TABLET ORAL
Status: COMPLETED | OUTPATIENT
Start: 2023-11-16 | End: 2023-11-16

## 2023-11-16 RX ORDER — DIPHENHYDRAMINE HYDROCHLORIDE 50 MG/ML
INJECTION INTRAMUSCULAR; INTRAVENOUS PRN
Status: DISCONTINUED | OUTPATIENT
Start: 2023-11-16 | End: 2023-11-16 | Stop reason: HOSPADM

## 2023-11-16 RX ORDER — ACETAMINOPHEN 325 MG/1
650 TABLET ORAL EVERY 4 HOURS PRN
Status: DISCONTINUED | OUTPATIENT
Start: 2023-11-16 | End: 2023-11-21 | Stop reason: HOSPADM

## 2023-11-16 RX ORDER — HEPARIN SODIUM 10000 [USP'U]/ML
INJECTION, SOLUTION INTRAVENOUS; SUBCUTANEOUS PRN
Status: DISCONTINUED | OUTPATIENT
Start: 2023-11-16 | End: 2023-11-16 | Stop reason: HOSPADM

## 2023-11-16 RX ORDER — OXYCODONE HYDROCHLORIDE AND ACETAMINOPHEN 5; 325 MG/1; MG/1
1 TABLET ORAL EVERY 4 HOURS PRN
Status: DISCONTINUED | OUTPATIENT
Start: 2023-11-16 | End: 2023-11-21 | Stop reason: HOSPADM

## 2023-11-16 RX ORDER — CLOPIDOGREL 300 MG/1
TABLET, FILM COATED ORAL PRN
Status: DISCONTINUED | OUTPATIENT
Start: 2023-11-16 | End: 2023-11-16 | Stop reason: HOSPADM

## 2023-11-16 RX ORDER — MIDAZOLAM HYDROCHLORIDE 1 MG/ML
INJECTION INTRAMUSCULAR; INTRAVENOUS PRN
Status: DISCONTINUED | OUTPATIENT
Start: 2023-11-16 | End: 2023-11-16 | Stop reason: HOSPADM

## 2023-11-16 RX ADMIN — HYDRALAZINE HYDROCHLORIDE 25 MG: 25 TABLET, FILM COATED ORAL at 06:26

## 2023-11-16 RX ADMIN — CARVEDILOL 18.75 MG: 6.25 TABLET, FILM COATED ORAL at 18:55

## 2023-11-16 RX ADMIN — BUMETANIDE 1 MG: 0.25 INJECTION INTRAMUSCULAR; INTRAVENOUS at 08:54

## 2023-11-16 RX ADMIN — PETROLATUM: 420 OINTMENT TOPICAL at 21:53

## 2023-11-16 RX ADMIN — ASPIRIN 81 MG: 81 TABLET, COATED ORAL at 08:54

## 2023-11-16 RX ADMIN — CARVEDILOL 18.75 MG: 6.25 TABLET, FILM COATED ORAL at 08:54

## 2023-11-16 RX ADMIN — HYDRALAZINE HYDROCHLORIDE 25 MG: 25 TABLET, FILM COATED ORAL at 14:58

## 2023-11-16 RX ADMIN — HYDRALAZINE HYDROCHLORIDE 25 MG: 25 TABLET, FILM COATED ORAL at 21:48

## 2023-11-16 RX ADMIN — HEPARIN SODIUM 17 UNITS/KG/HR: 10000 INJECTION, SOLUTION INTRAVENOUS at 06:47

## 2023-11-16 RX ADMIN — INSULIN GLARGINE 10 UNITS: 100 INJECTION, SOLUTION SUBCUTANEOUS at 21:48

## 2023-11-16 RX ADMIN — ATORVASTATIN CALCIUM 40 MG: 40 TABLET, FILM COATED ORAL at 21:48

## 2023-11-16 RX ADMIN — INSULIN LISPRO 4 UNITS: 100 INJECTION, SOLUTION INTRAVENOUS; SUBCUTANEOUS at 13:51

## 2023-11-16 RX ADMIN — WARFARIN SODIUM 2.5 MG: 5 TABLET ORAL at 18:56

## 2023-11-16 RX ADMIN — ALUMINUM HYDROXIDE, MAGNESIUM HYDROXIDE, AND SIMETHICONE: 200; 200; 20 SUSPENSION ORAL at 15:36

## 2023-11-16 RX ADMIN — Medication 10 ML: at 21:49

## 2023-11-16 RX ADMIN — INSULIN LISPRO 8 UNITS: 100 INJECTION, SOLUTION INTRAVENOUS; SUBCUTANEOUS at 18:55

## 2023-11-16 RX ADMIN — BUMETANIDE 1 MG: 0.25 INJECTION INTRAMUSCULAR; INTRAVENOUS at 21:48

## 2023-11-16 RX ADMIN — SODIUM CHLORIDE: 9 INJECTION, SOLUTION INTRAVENOUS at 12:38

## 2023-11-16 RX ADMIN — INSULIN LISPRO 4 UNITS: 100 INJECTION, SOLUTION INTRAVENOUS; SUBCUTANEOUS at 21:49

## 2023-11-16 RX ADMIN — HYDROCODONE BITARTRATE AND ACETAMINOPHEN 1 TABLET: 5; 325 TABLET ORAL at 16:11

## 2023-11-16 RX ADMIN — Medication 10 ML: at 08:55

## 2023-11-16 RX ADMIN — INSULIN LISPRO 8 UNITS: 100 INJECTION, SOLUTION INTRAVENOUS; SUBCUTANEOUS at 18:56

## 2023-11-16 RX ADMIN — NITROGLYCERIN 0.4 MG: 0.4 TABLET, ORALLY DISINTEGRATING SUBLINGUAL at 14:20

## 2023-11-16 RX ADMIN — ACETAMINOPHEN 650 MG: 325 TABLET ORAL at 12:32

## 2023-11-16 RX ADMIN — HYDROCODONE BITARTRATE AND ACETAMINOPHEN 1 TABLET: 5; 325 TABLET ORAL at 21:48

## 2023-11-16 RX ADMIN — PETROLATUM: 420 OINTMENT TOPICAL at 14:58

## 2023-11-16 RX ADMIN — INSULIN LISPRO 8 UNITS: 100 INJECTION, SOLUTION INTRAVENOUS; SUBCUTANEOUS at 13:52

## 2023-11-16 RX ADMIN — HEPARIN SODIUM 17 UNITS/KG/HR: 10000 INJECTION, SOLUTION INTRAVENOUS at 15:47

## 2023-11-16 ASSESSMENT — PAIN SCALES - GENERAL
PAINLEVEL_OUTOF10: 2
PAINLEVEL_OUTOF10: 8
PAINLEVEL_OUTOF10: 8
PAINLEVEL_OUTOF10: 4
PAINLEVEL_OUTOF10: 9

## 2023-11-16 ASSESSMENT — PAIN DESCRIPTION - ONSET
ONSET: ON-GOING
ONSET: ON-GOING

## 2023-11-16 ASSESSMENT — PAIN DESCRIPTION - LOCATION
LOCATION: BACK
LOCATION: BACK

## 2023-11-16 ASSESSMENT — PAIN DESCRIPTION - DESCRIPTORS
DESCRIPTORS: ACHING;DISCOMFORT;SORE
DESCRIPTORS: ACHING;DISCOMFORT;SORE

## 2023-11-16 ASSESSMENT — PAIN DESCRIPTION - PAIN TYPE
TYPE: ACUTE PAIN
TYPE: ACUTE PAIN

## 2023-11-16 ASSESSMENT — PAIN DESCRIPTION - FREQUENCY
FREQUENCY: CONTINUOUS
FREQUENCY: CONTINUOUS

## 2023-11-16 ASSESSMENT — PAIN DESCRIPTION - ORIENTATION
ORIENTATION: MID;LOWER
ORIENTATION: MID;LOWER

## 2023-11-16 ASSESSMENT — PAIN - FUNCTIONAL ASSESSMENT
PAIN_FUNCTIONAL_ASSESSMENT: PREVENTS OR INTERFERES SOME ACTIVE ACTIVITIES AND ADLS
PAIN_FUNCTIONAL_ASSESSMENT: PREVENTS OR INTERFERES SOME ACTIVE ACTIVITIES AND ADLS

## 2023-11-16 NOTE — FLOWSHEET NOTE
Inpatient Wound SCOTT([age]M)    Admit Date: 11/13/2023  6:32 AM    Reason for consult:  skin assessment with prevalence    Findings:     11/16/23 1400   Wound 11/13/23 Coccyx   Date First Assessed/Time First Assessed: 11/13/23 0800   Present on Original Admission: Yes  Location: Coccyx   Wound Image   (wee buttocks)   Wound Etiology Pressure Stage 2   Dressing/Treatment Pharmaceutical agent (see MAR)   Wound Length (cm) 0.8 cm   Wound Width (cm) 1.2 cm   Wound Depth (cm) 0.1 cm   Wound Surface Area (cm^2) 0.96 cm^2   Change in Wound Size % (l*w) 68   Wound Volume (cm^3) 0.096 cm^3   Wound Healing % 68   Wound Assessment Pink/red   Drainage Amount None (dry)   Aysha-wound Assessment Blanchable erythema   Wound 11/16/23 Buttocks Right   Date First Assessed/Time First Assessed: 11/16/23 0915   Present on Original Admission: No  Location: Buttocks  Wound Location Orientation: Right   Wound Image    Wound Etiology Pressure Stage 2   Dressing/Treatment Pharmaceutical agent (see MAR)   Wound Length (cm) 5 cm   Wound Width (cm) 3 cm   Wound Depth (cm) 0.1 cm   Wound Surface Area (cm^2) 15 cm^2   Change in Wound Size % (l*w) 0   Wound Volume (cm^3) 1.5 cm^3   Wound Assessment Pink/red   Drainage Amount None (dry)   Aysha-wound Assessment Intact   Incision 11/16/23 Back Lower;Right;Left   Date First Assessed/Time First Assessed: 11/16/23 0915   Present on Original Admission: Yes  Location: Back  Incision Location Orientation: Lower;Right;Left   Dressing Status New dressing applied   Dressing Change Due 11/17/23   Incision Cleansed Wound cleanser   Dressing/Treatment Dry dressing   Closure Steri-Strips   Margins Approximated   Incision Assessment Dry   Drainage Amount None (dry)   Odor None   Aysha-incision Assessment Blanchable erythema   Incision 11/16/23 Hip Left   Date First Assessed/Time First Assessed: 11/16/23 0915   Location: Hip  Incision Location Orientation: Left   Dressing Status New dressing applied   Dressing Change

## 2023-11-16 NOTE — PLAN OF CARE
Problem: Chronic Conditions and Co-morbidities  Goal: Patient's chronic conditions and co-morbidity symptoms are monitored and maintained or improved  Outcome: Progressing     Problem: Discharge Planning  Goal: Discharge to home or other facility with appropriate resources  Outcome: Progressing     Problem: Safety - Adult  Goal: Free from fall injury  Outcome: Progressing  Flowsheets (Taken 11/16/2023 1041)  Free From Fall Injury: Instruct family/caregiver on patient safety     Problem: ABCDS Injury Assessment  Goal: Absence of physical injury  Outcome: Progressing  Flowsheets (Taken 11/16/2023 1041)  Absence of Physical Injury: Implement safety measures based on patient assessment     Problem: Pain  Goal: Verbalizes/displays adequate comfort level or baseline comfort level  Outcome: Progressing     Problem: Skin/Tissue Integrity  Goal: Absence of new skin breakdown  Description: 1. Monitor for areas of redness and/or skin breakdown  2. Assess vascular access sites hourly  3. Every 4-6 hours minimum:  Change oxygen saturation probe site  4. Every 4-6 hours:  If on nasal continuous positive airway pressure, respiratory therapy assess nares and determine need for appliance change or resting period.   Outcome: Progressing

## 2023-11-16 NOTE — CARE COORDINATION
11/16/23  Transition of Care Update. Patient admitted for NSTEMI . Patient is POD#6 for LLIF of L4-5 with hardware removal from 11/9. Patient is being followed by ortho and Cardiology . Patient is planned for a heart cath today. Patient is on 3 liters of 02 with attempts to wean. Patient is on IV Bumex and heparin. Patient will need PT/OT ordered once bed rest restriction is lifted to assess for any post discharge support. Discharge goal is home when medically stable. SW/PAULA to follow.     Electronically signed by MAXWELL Cuellar on 11/16/2023 at 2:42 PM

## 2023-11-16 NOTE — PATIENT CARE CONFERENCE
P Quality Flow/Interdisciplinary Rounds Progress Note        Quality Flow Rounds held on November 16, 2023    Disciplines Attending:  Bedside Nurse, , , and Nursing Unit Leadership    Yuri Wade was admitted on 11/13/2023  6:32 AM    Anticipated Discharge Date:       Disposition:    Jayjay Score:  Jayjay Scale Score: 16    Readmission Risk              Risk of Unplanned Readmission:  28           Discussed patient goal for the day, patient clinical progression, and barriers to discharge.   The following Goal(s) of the Day/Commitment(s) have been identified:  Report labs/diagnostics      Ryley Weaver RN  November 16, 2023

## 2023-11-16 NOTE — PROGRESS NOTES
Comprehensive Nutrition Assessment    Type and Reason for Visit:  Initial, Wound, Consult    Nutrition Recommendations/Plan:   Advance diet when medically appropriate. When diet advances, will recommend and start Glucerna supplement BID and Cipriano wound healing supplement BID to help meet increased nutritional needs from wound healing. Malnutrition Assessment:  Malnutrition Status: At risk for malnutrition (Comment) (11/16/23 1126)    Context:  Acute Illness     Findings of the 6 clinical characteristics of malnutrition:  Energy Intake:  Mild decrease in energy intake (Comment) (since admission)  Weight Loss:  Unable to assess (d/t lack of weight history)     Body Fat Loss:  Unable to assess (pt off floor at this time)     Muscle Mass Loss:  Unable to assess (pt off floor at this time)    Fluid Accumulation:  No significant fluid accumulation     Strength:  Not Performed    Nutrition Assessment:    Patient is currently NPO for planned cardiac cath ; adm w/ chest pain and SOB ; noted NSTEMI and acute pulmonary embolism ; noted acute hypoxic respiratory failure ; hx of DM/CAD/CHF/CKD ; s/p LLIF L4-5 with previous hardware removal on 11/9 ; wounds noted ; will start ONS when diet advances    Nutrition Related Findings:    -I&Os (-7.0 L), 1+ edema, A&O x 4, active BS, distended abd, redness to buttocks ; Wound Type: Multiple, Open Wounds, Surgical Incision, Wound Consult Pending (wounds x 2 ; Incision x 1)       Current Nutrition Intake & Therapies:    Average Meal Intake: NPO, 51-75%     Diet NPO Exceptions are: Sips of Water with Meds    Anthropometric Measures:  Height: 162.6 cm (5' 4\")  Ideal Body Weight (IBW): 120 lbs (55 kg)    Admission Body Weight: 98 kg (216 lb) (11/14, first bedscale)  Current Body Weight: 98 kg (216 lb) (11/16, bedscale), 180 % IBW.  Weight Source: Bed Scale  Current BMI (kg/m2): 37.1  Usual Body Weight:  (UTO ; no weights available in EMR hx from previous encounters)

## 2023-11-16 NOTE — PROGRESS NOTES
We were notified by the nursing staff that Ms. Mary Valadez reported chest pressure after returning to the floor following PCI. On evaluation, she states the pressure began after she ate lunch; she is lying flat at this time, but was apparently sitting up at the onset. The discomfort is different than what she had prior to admission, and she denies nausea or dyspnea. She was given one sublingual nitroglycerin with no relief; EKG showed SR with no acute changes from her previous. She has a history of GERD, will give GI cocktail. Discussed with Dr. Brayan Tate and her daughter was updated at the bedside.

## 2023-11-16 NOTE — PROGRESS NOTES
05:24 AM    HGB 10.2 11/16/2023 05:24 AM    HCT 31.6 11/16/2023 05:24 AM    MCV 90.3 11/16/2023 05:24 AM    MCH 29.1 11/16/2023 05:24 AM    MCHC 32.3 11/16/2023 05:24 AM    RDW 13.1 11/16/2023 05:24 AM     11/16/2023 05:24 AM    MPV 10.8 11/16/2023 05:24 AM     BMP:   Lab Results   Component Value Date/Time     11/16/2023 03:15 AM    K 4.4 11/16/2023 03:15 AM     11/16/2023 03:15 AM    CO2 25 11/16/2023 03:15 AM    BUN 44 11/16/2023 03:15 AM    LABALBU 2.8 11/13/2023 08:37 AM    CREATININE 1.1 11/16/2023 03:15 AM    CALCIUM 8.5 11/16/2023 03:15 AM    GFRAA 45 12/28/2020 04:38 PM    LABGLOM 53 11/16/2023 03:15 AM     Magnesium:    Lab Results   Component Value Date/Time    MG 2.0 11/16/2023 03:15 AM     Cardiac Enzymes:   Lab Results   Component Value Date    TROPHS 829 (H) 11/14/2023    TROPHS 694 (H) 11/13/2023    TROPHS 777 (H) 11/12/2023      PT/INR:    Lab Results   Component Value Date/Time    PROTIME 14.1 11/16/2023 03:15 AM    INR 1.3 11/16/2023 03:15 AM     TSH:    Lab Results   Component Value Date/Time    TSH 1.570 12/28/2020 04:38 PM     Rhythm Strip: normal sinus rhythm. Echo Summary 11/13/2023:    Left Ventricle: Severely reduced left ventricular systolic function. EF by 2D Simpsons Biplane is 30%. Left ventricle size is normal. Normal wall thickness. Severe global hypokinesis present. Right Ventricle: Normal systolic function. Mitral Valve: MV mean gradient is 7 mmHg. Moderate annular calcification of the mitral valve. Mild to moderate regurgitation. Mild to moderate stenosis noted. MV mean gradient is 7 mmHg. Tricuspid Valve: Normal RVSP. Interatrial Septum: Agitated saline study was positive without provocation. Right to left shunt was noted. Pericardium: No pericardial effusion. Contrast used: Definity.     ASSESSMENT AND PLAN:  Patient Active Problem List   Diagnosis    Right cataract    Left cataract    Acute pulmonary embolism (HCC)    Unsteadiness on feet Stage 3b chronic kidney disease (HCC)    Sciatica of right side    Proteinuria    Post-laminectomy syndrome    Obesity    Mixed hyperlipidemia    Lumbosacral radiculopathy    Iron deficiency anemia    Hypomagnesemia    Hyperuricemia    Hypertension    Hyperkalemia    Type 2 diabetes mellitus with chronic kidney disease (HCC)    Disorder of skin    Chronic kidney disease    Benign hypertensive kidney disease with chronic kidney disease    Anemia in chronic kidney disease    Acute renal failure (HCC)    NSTEMI (non-ST elevated myocardial infarction) ([address])     1. NSTEMI/CP/CAD    Chart/labs/EKG/monitor reviewed. Patient reports having MI in 2004 and having a \"clot removed from RCA\". She had one stent to the RCA and is unsure of where the other is located. Records from [address] [place] [address] reviewed. Low risk stress 10/2022. Troponin elevated. Echo with severe LV dysfunction, RV okay. Cr has improved to 1.1. Ischemia evaluation by cath today revealed significant LAD disease that received a NIHARIKA x1.  of PDA noted, which will be medically managed. ASA/statin/BB/nitrate/plavix. 2. Acute systolic CHF:    Echo with severe LV dysfunction. Ischemic versus stress induced CMP. See above. Gently diurese. Cr improving. BB/hydralazine/nitrates. Add entresto/aldactone if Cr continues to remain stabilize after cath. 3. PAF:     Monitor. IV heparin for PE. Will be indicated for anticoagulation on DC due to PE. If evidence of PAF, then should opt for warfarin given MS. Heparin bridge to warfarin. 4. PE:    IV heparin. [facility] [place]. 5. ID issues/Pneumonia: Per primary service. 6. DJD: Back surgery 11/9/2023.    7. Acute on CKD: Follow labs. 8. Anemia: Follow labs. 9. HTN: Observe. 10. Lipids: Statin. 11. DM: Observe. 12. GERD    13. Obesity     Available external charts reviewed. Available imaging and evaluations independently reviewed.    Bel Hernadez

## 2023-11-16 NOTE — PROGRESS NOTES
Pharmacy Consultation Note  (Warfarin Dosing and Monitoring)    Initial consult date: 11/15/23  Consulting Provider: Dr. Leena Batista is a 70 y.o. female for whom pharmacy has been asked to manage warfarin therapy. Weight:   Wt Readings from Last 1 Encounters:   11/16/23 98.1 kg (216 lb 4.7 oz)       TSH:    Lab Results   Component Value Date/Time    TSH 1.570 12/28/2020 04:38 PM       Hepatic Function Panel:                            Lab Results   Component Value Date/Time    ALKPHOS 69 11/13/2023 08:37 AM    ALT 10 11/13/2023 08:37 AM    AST 26 11/13/2023 08:37 AM    PROT 6.2 11/13/2023 08:37 AM    BILITOT 0.6 11/13/2023 08:37 AM    BILIDIR 0.2 11/13/2023 08:37 AM    IBILI 0.4 11/13/2023 08:37 AM    LABALBU 2.8 11/13/2023 08:37 AM       Current warfarin drug-drug interactions include: No significant interactions    Recent Labs     11/14/23  0423 11/15/23  0631 11/16/23  0524   HGB 8.7* 9.1* 10.2*    254 297       Date Warfarin Dose INR Heparin or LMWH Comment   11/15 Hold for planned cath tomorrow X Heparin drip    11/16  1.3 UFH infusion Heart Cath today                          Assessment:  Patient is a 70 y.o. female on warfarin for New Pulmonary Embolism s/p recent LLIF of L4-5 with previous hardware removal  Per cardiology's note, warfarin preferred considering history of MS and ? PAF if evident (cath plan 11/16). 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 Goal INR 2 - 3.  11/16: INR = 1.3 today; pending heart cath today. Warfarin to start following heart cath pending results. Plan: Will start warfarin tonight if indicated. Daily PT/INR until the INR is stable within the therapeutic range. Pharmacist will follow and monitor/adjust dosing as necessary.     Skyler Norman, HuberD  11/16/2023  8:54 AM

## 2023-11-16 NOTE — PROGRESS NOTES
Report called to Lucien Kraus on 6500. Informed that Heparin can resume in one hour after the  vasc band comes off, with no bolus, per Dr. Cardoza July.

## 2023-11-16 NOTE — PROGRESS NOTES
4 Eyes Skin Assessment     NAME:  Rula Garland  YOB: 1945  MEDICAL RECORD NUMBER:  [id number]    The patient is being assessed for  Transfer to New Unit    I agree that at least one RN has performed a thorough Head to Toe Skin Assessment on the patient. ALL assessment sites listed below have been assessed. Areas assessed by both nurses:    Head, Face, Ears, Shoulders, Back, Chest, Arms, Elbows, Hands, Sacrum. Buttock, Coccyx, Ischium, and Legs. Feet and Heels        Does the Patient have a Wound? Yes wound(s) were present on assessment.  LDA wound assessment was Initiated and completed by RN       Jayjay Prevention initiated by RN: Yes  Wound Care Orders initiated by RN: Yes    Pressure Injury (Stage 3,4, Unstageable, DTI, NWPT, and Complex wounds) if present, place Wound referral order by RN under : No    New Ostomies, if present place, Ostomy referral order under : No     Nurse 1 eSignature: Electronically signed by Zahra Bright RN on 11/15/23 at 8:35 PM EST    **SHARE this note so that the co-signing nurse can place an eSignature**    Nurse 2 eSignature: {Esignature:[id number]}

## 2023-11-17 LAB
ANION GAP SERPL CALCULATED.3IONS-SCNC: 11 MMOL/L (ref 7–16)
BASOPHILS # BLD: 0 K/UL (ref 0–0.2)
BASOPHILS NFR BLD: 0 % (ref 0–2)
BUN SERPL-MCNC: 46 MG/DL (ref 6–23)
CALCIUM SERPL-MCNC: 8.6 MG/DL (ref 8.6–10.2)
CHLORIDE SERPL-SCNC: 99 MMOL/L (ref 98–107)
CO2 SERPL-SCNC: 28 MMOL/L (ref 22–29)
CREAT SERPL-MCNC: 1.2 MG/DL (ref 0.5–1)
ECHO BSA: 2.02 M2
EKG ATRIAL RATE: 68 BPM
EKG P AXIS: 42 DEGREES
EKG P-R INTERVAL: 140 MS
EKG Q-T INTERVAL: 466 MS
EKG QRS DURATION: 100 MS
EKG QTC CALCULATION (BAZETT): 495 MS
EKG R AXIS: -7 DEGREES
EKG T AXIS: 144 DEGREES
EKG VENTRICULAR RATE: 68 BPM
EOSINOPHIL # BLD: 0.17 K/UL (ref 0.05–0.5)
EOSINOPHILS RELATIVE PERCENT: 1 % (ref 0–6)
ERYTHROCYTE [DISTWIDTH] IN BLOOD BY AUTOMATED COUNT: 13.3 % (ref 11.5–15)
GFR SERPL CREATININE-BSD FRML MDRD: 49 ML/MIN/1.73M2
GLUCOSE BLD-MCNC: 221 MG/DL (ref 74–99)
GLUCOSE BLD-MCNC: 221 MG/DL (ref 74–99)
GLUCOSE BLD-MCNC: 225 MG/DL (ref 74–99)
GLUCOSE BLD-MCNC: 241 MG/DL (ref 74–99)
GLUCOSE SERPL-MCNC: 197 MG/DL (ref 74–99)
HCT VFR BLD AUTO: 31.2 % (ref 34–48)
HGB BLD-MCNC: 10.1 G/DL (ref 11.5–15.5)
INR PPP: 1.3
LYMPHOCYTES NFR BLD: 3.37 K/UL (ref 1.5–4)
LYMPHOCYTES RELATIVE PERCENT: 17 % (ref 20–42)
MCH RBC QN AUTO: 29.6 PG (ref 26–35)
MCHC RBC AUTO-ENTMCNC: 32.4 G/DL (ref 32–34.5)
MCV RBC AUTO: 91.5 FL (ref 80–99.9)
MICROORGANISM SPEC CULT: ABNORMAL
MICROORGANISM SPEC CULT: ABNORMAL
MICROORGANISM/AGENT SPEC: ABNORMAL
MICROORGANISM/AGENT SPEC: ABNORMAL
MONOCYTES NFR BLD: 0.51 K/UL (ref 0.1–0.95)
MONOCYTES NFR BLD: 3 % (ref 2–12)
MTHFR INTERPRETATION: NORMAL
MTHFR MUTATION A1286C: NEGATIVE
MTHFR MUTATION C665T: NORMAL
NEUTROPHILS NFR BLD: 79 % (ref 43–80)
NEUTS SEG NFR BLD: 15.35 K/UL (ref 1.8–7.3)
PARTIAL THROMBOPLASTIN TIME: 63.2 SEC (ref 24.5–35.1)
PLATELET # BLD AUTO: 314 K/UL (ref 130–450)
PMV BLD AUTO: 11 FL (ref 7–12)
POTASSIUM SERPL-SCNC: 4.2 MMOL/L (ref 3.5–5)
PROTHROMBIN TIME: 13.9 SEC (ref 9.3–12.4)
RBC # BLD AUTO: 3.41 M/UL (ref 3.5–5.5)
RBC # BLD: ABNORMAL 10*6/UL
SERVICE CMNT-IMP: ABNORMAL
SERVICE CMNT-IMP: ABNORMAL
SODIUM SERPL-SCNC: 138 MMOL/L (ref 132–146)
SPECIMEN DESCRIPTION: ABNORMAL
SPECIMEN DESCRIPTION: ABNORMAL
SPECIMEN: NORMAL
WBC OTHER # BLD: 19.4 K/UL (ref 4.5–11.5)

## 2023-11-17 PROCEDURE — 85730 THROMBOPLASTIN TIME PARTIAL: CPT

## 2023-11-17 PROCEDURE — 6370000000 HC RX 637 (ALT 250 FOR IP)

## 2023-11-17 PROCEDURE — 85025 COMPLETE CBC W/AUTO DIFF WBC: CPT

## 2023-11-17 PROCEDURE — 2700000000 HC OXYGEN THERAPY PER DAY

## 2023-11-17 PROCEDURE — 36415 COLL VENOUS BLD VENIPUNCTURE: CPT

## 2023-11-17 PROCEDURE — 6370000000 HC RX 637 (ALT 250 FOR IP): Performed by: INTERNAL MEDICINE

## 2023-11-17 PROCEDURE — 93005 ELECTROCARDIOGRAM TRACING: CPT | Performed by: INTERNAL MEDICINE

## 2023-11-17 PROCEDURE — 2500000003 HC RX 250 WO HCPCS: Performed by: INTERNAL MEDICINE

## 2023-11-17 PROCEDURE — 97530 THERAPEUTIC ACTIVITIES: CPT

## 2023-11-17 PROCEDURE — 82962 GLUCOSE BLOOD TEST: CPT

## 2023-11-17 PROCEDURE — 97535 SELF CARE MNGMENT TRAINING: CPT

## 2023-11-17 PROCEDURE — 85610 PROTHROMBIN TIME: CPT

## 2023-11-17 PROCEDURE — 93010 ELECTROCARDIOGRAM REPORT: CPT | Performed by: INTERNAL MEDICINE

## 2023-11-17 PROCEDURE — 97161 PT EVAL LOW COMPLEX 20 MIN: CPT

## 2023-11-17 PROCEDURE — 80048 BASIC METABOLIC PNL TOTAL CA: CPT

## 2023-11-17 PROCEDURE — 2580000003 HC RX 258

## 2023-11-17 PROCEDURE — 6360000002 HC RX W HCPCS

## 2023-11-17 PROCEDURE — 99233 SBSQ HOSP IP/OBS HIGH 50: CPT | Performed by: INTERNAL MEDICINE

## 2023-11-17 PROCEDURE — 97165 OT EVAL LOW COMPLEX 30 MIN: CPT

## 2023-11-17 PROCEDURE — 2140000000 HC CCU INTERMEDIATE R&B

## 2023-11-17 RX ORDER — BUMETANIDE 0.25 MG/ML
1 INJECTION INTRAMUSCULAR; INTRAVENOUS DAILY
Status: DISCONTINUED | OUTPATIENT
Start: 2023-11-18 | End: 2023-11-20

## 2023-11-17 RX ORDER — SPIRONOLACTONE 25 MG/1
12.5 TABLET ORAL DAILY
Status: DISCONTINUED | OUTPATIENT
Start: 2023-11-17 | End: 2023-11-21 | Stop reason: HOSPADM

## 2023-11-17 RX ORDER — WARFARIN SODIUM 5 MG/1
2.5 TABLET ORAL
Status: COMPLETED | OUTPATIENT
Start: 2023-11-17 | End: 2023-11-17

## 2023-11-17 RX ADMIN — WARFARIN SODIUM 2.5 MG: 5 TABLET ORAL at 09:48

## 2023-11-17 RX ADMIN — HYDRALAZINE HYDROCHLORIDE 25 MG: 25 TABLET, FILM COATED ORAL at 15:04

## 2023-11-17 RX ADMIN — PETROLATUM: 420 OINTMENT TOPICAL at 21:03

## 2023-11-17 RX ADMIN — HYDROCODONE BITARTRATE AND ACETAMINOPHEN 1 TABLET: 5; 325 TABLET ORAL at 22:11

## 2023-11-17 RX ADMIN — BUMETANIDE 1 MG: 0.25 INJECTION INTRAMUSCULAR; INTRAVENOUS at 09:48

## 2023-11-17 RX ADMIN — Medication 10 ML: at 09:49

## 2023-11-17 RX ADMIN — INSULIN GLARGINE 10 UNITS: 100 INJECTION, SOLUTION SUBCUTANEOUS at 09:49

## 2023-11-17 RX ADMIN — SPIRONOLACTONE 12.5 MG: 25 TABLET ORAL at 12:46

## 2023-11-17 RX ADMIN — ASPIRIN 81 MG: 81 TABLET, COATED ORAL at 09:49

## 2023-11-17 RX ADMIN — INSULIN LISPRO 8 UNITS: 100 INJECTION, SOLUTION INTRAVENOUS; SUBCUTANEOUS at 09:50

## 2023-11-17 RX ADMIN — INSULIN LISPRO 8 UNITS: 100 INJECTION, SOLUTION INTRAVENOUS; SUBCUTANEOUS at 17:36

## 2023-11-17 RX ADMIN — HYDRALAZINE HYDROCHLORIDE 25 MG: 25 TABLET, FILM COATED ORAL at 21:02

## 2023-11-17 RX ADMIN — INSULIN LISPRO 2 UNITS: 100 INJECTION, SOLUTION INTRAVENOUS; SUBCUTANEOUS at 09:50

## 2023-11-17 RX ADMIN — CLOPIDOGREL BISULFATE 75 MG: 75 TABLET ORAL at 09:49

## 2023-11-17 RX ADMIN — PETROLATUM: 420 OINTMENT TOPICAL at 09:50

## 2023-11-17 RX ADMIN — HYDRALAZINE HYDROCHLORIDE 25 MG: 25 TABLET, FILM COATED ORAL at 05:46

## 2023-11-17 RX ADMIN — INSULIN LISPRO 2 UNITS: 100 INJECTION, SOLUTION INTRAVENOUS; SUBCUTANEOUS at 17:36

## 2023-11-17 RX ADMIN — CARVEDILOL 18.75 MG: 6.25 TABLET, FILM COATED ORAL at 09:49

## 2023-11-17 RX ADMIN — SACUBITRIL AND VALSARTAN 1 TABLET: 24; 26 TABLET, FILM COATED ORAL at 12:46

## 2023-11-17 RX ADMIN — HEPARIN SODIUM 17 UNITS/KG/HR: 10000 INJECTION, SOLUTION INTRAVENOUS at 10:49

## 2023-11-17 RX ADMIN — INSULIN GLARGINE 10 UNITS: 100 INJECTION, SOLUTION SUBCUTANEOUS at 21:04

## 2023-11-17 RX ADMIN — INSULIN LISPRO 2 UNITS: 100 INJECTION, SOLUTION INTRAVENOUS; SUBCUTANEOUS at 12:47

## 2023-11-17 RX ADMIN — INSULIN LISPRO 8 UNITS: 100 INJECTION, SOLUTION INTRAVENOUS; SUBCUTANEOUS at 12:46

## 2023-11-17 RX ADMIN — ATORVASTATIN CALCIUM 40 MG: 40 TABLET, FILM COATED ORAL at 21:02

## 2023-11-17 RX ADMIN — SACUBITRIL AND VALSARTAN 1 TABLET: 24; 26 TABLET, FILM COATED ORAL at 21:02

## 2023-11-17 ASSESSMENT — PAIN SCALES - GENERAL
PAINLEVEL_OUTOF10: 3
PAINLEVEL_OUTOF10: 0
PAINLEVEL_OUTOF10: 0

## 2023-11-17 NOTE — PROGRESS NOTES
OCCUPATIONAL THERAPY INITIAL EVALUATION    [facility]   [address], [place], [place]      KCDI:                                                  Patient Name: Gregorio Galvan  MRN: [id number]  : 1945  Room: [address] [place] [address]    Evaluating OT: RODRIGO Church, OTR/L  # [id number]    Referring Provider:  Huey Greenfield MD  Specific Provider Orders:  Sushma Torrez and Treat\"  23    Diagnosis: NSTEMI (non-ST elevated myocardial infarction) ([address]) [I21.4]    Pt was transferred from Outside facility w/ c/o Chest Pressure/Heaviness  (+) PE s/p Spinal Surgery     Pertinent Medical History:  Pt has a past medical history of Atrial fibrillation ([address]), CAD (coronary artery disease), CHF (congestive heart failure) ([address]), Chronic back pain, GERD (gastroesophageal reflux disease), Hx of blood clots, Hyperlipidemia, Hypertension, Irritable bowel syndrome, Myocardial infarction ([address]), Neuropathy, Obesity, Osteoarthritis, Renal insufficiency, Restless legs syndrome, and Type II or unspecified type diabetes mellitus without mention of complication, not stated as uncontrolled. ,  has a past surgical history that includes Colonoscopy; Upper gastrointestinal endoscopy; parathyroidectomy; Carpal tunnel release (Bilateral); Coronary angioplasty with stent (); Spine surgery; Cataract removal with implant (Right,  15); Tonsillectomy; Hysterectomy; Cataract removal with implant (Left, 5 10 16); Cardiac procedure (N/A, 2023); and Cardiac procedure (N/A, 2023).     Surgeries this admission: None;  Underwent Lateral Interbody Fusion w/ pedicle screw fixation L4-L5 on 23 w/ previous hardware removal    Precautions:  Fall Risk  Spinal Precautions  2L O2 - monitor O2 sats  Castillo Catheter    Assessment of current deficits   [x] Functional mobility  [x]ADLs  [x] Strength               []Cognition   [x] Functional transfers   [x] IADLs

## 2023-11-17 NOTE — PLAN OF CARE
Problem: Chronic Conditions and Co-morbidities  Goal: Patient's chronic conditions and co-morbidity symptoms are monitored and maintained or improved  11/16/2023 1945 by Julien Oquendo RN  Outcome: Progressing  11/16/2023 1042 by Ronnell Conner RN  Outcome: Progressing     Problem: Discharge Planning  Goal: Discharge to home or other facility with appropriate resources  11/16/2023 1945 by Julien Oquendo RN  Outcome: Progressing  11/16/2023 1042 by Ronnell Conner RN  Outcome: Progressing     Problem: Safety - Adult  Goal: Free from fall injury  11/16/2023 1945 by Julien Oquendo RN  Outcome: Progressing  11/16/2023 1042 by Ronnell Conner RN  Outcome: Progressing  Flowsheets (Taken 11/16/2023 1041)  Free From Fall Injury: Instruct family/caregiver on patient safety     Problem: ABCDS Injury Assessment  Goal: Absence of physical injury  11/16/2023 1945 by Julien Oquendo RN  Outcome: Progressing  11/16/2023 1042 by Ronnell Conner RN  Outcome: Progressing  Flowsheets (Taken 11/16/2023 1041)  Absence of Physical Injury: Implement safety measures based on patient assessment     Problem: Pain  Goal: Verbalizes/displays adequate comfort level or baseline comfort level  11/16/2023 1945 by Julien Oquendo RN  Outcome: Progressing  11/16/2023 1042 by Ronnell Conner RN  Outcome: Progressing     Problem: Skin/Tissue Integrity  Goal: Absence of new skin breakdown  Description: 1. Monitor for areas of redness and/or skin breakdown  2. Assess vascular access sites hourly  3. Every 4-6 hours minimum:  Change oxygen saturation probe site  4. Every 4-6 hours:  If on nasal continuous positive airway pressure, respiratory therapy assess nares and determine need for appliance change or resting period.   11/16/2023 1945 by Julien Oquendo RN  Outcome: Progressing  11/16/2023 1042 by Ronnell Conner RN  Outcome: Progressing     Problem: Nutrition Deficit:  Goal: Optimize nutritional status  Outcome:

## 2023-11-17 NOTE — DISCHARGE INSTR - COC
Continuity of Care Form    Patient Name: Hannah Garcia   :  [birth year]  MRN:  [id number]    Admit date:  2023  Discharge date:  2023    Code Status Order: Full Code   Advance Directives:     Admitting Physician:  Brittany Souza MD  PCP: Fiorella Lawson MD    Discharging Nurse: SARY Hernandez [facility] Unit/Room#: 3505/5721-Y  Discharging Unit Phone Number: ***    Emergency Contact:   Extended Emergency Contact Information  Primary Emergency Contact: [address] Phone: [phone]  Mobile Phone: [phone]  Relation: Child  Hearing or visual needs: None  Other needs: None  Preferred language: [place]   needed? No  Secondary Emergency Contact: Rashid Negron [facility] of [address] Phone: [phone]  Mobile Phone: [phone]  Relation: Brother/Sister  Hearing or visual needs: None  Other needs: None  Preferred language: English   needed? No    Past Surgical History:  Past Surgical History:   Procedure Laterality Date    CARDIAC PROCEDURE N/A 2023    Left heart cath / coronary angiography performed by Philemon Bosworth, DO at [address] [place] [address] N/A 2023    Percutaneous coronary intervention performed by Philemon Bosworth, DO at [address] [place] [address] CATH LAB    CARPAL TUNNEL RELEASE Bilateral     CATARACT REMOVAL WITH IMPLANT Right 12 1 15    CATARACT REMOVAL WITH IMPLANT Left 5 10 16    COLONOSCOPY      CORONARY ANGIOPLASTY WITH STENT PLACEMENT      2 stents    HYSTERECTOMY (CERVIX STATUS UNKNOWN)      vaginal    PARATHYROIDECTOMY      SPINE SURGERY      lumbar x5 rods & screws    TONSILLECTOMY      UPPER GASTROINTESTINAL ENDOSCOPY         Immunization History: There is no immunization history on file for this patient.     Active Problems:  Patient Active Problem List   Diagnosis Code    Right cataract H26.9    Left cataract H26.9    Acute pulmonary embolism (HCC) I26.99    Unsteadiness on feet R26.81

## 2023-11-17 NOTE — PROGRESS NOTES
Physical Therapy  Physical Therapy Initial Assessment     Name: Tino Child  : 1945  MRN: [id number]      Date of Service: 2023    Evaluating PT:  Emilee Blount, PT, DPT, [id number]    Room #:  0590/2834-W  Diagnosis:  NSTEMI (non-ST elevated myocardial infarction) [facility]) [I21.4]  PMHx/PSHx:    Past Medical History:   Diagnosis Date    Atrial fibrillation ([address])     CAD (coronary artery disease)     CHF (congestive heart failure) ([address])     after heart attack    Chronic back pain     GERD (gastroesophageal reflux disease)     Hx of blood clots     thrombophlebitis leg    Hyperlipidemia     Hypertension     Irritable bowel syndrome     Myocardial infarction ([address])     Neuropathy     Obesity     Osteoarthritis     Renal insufficiency     chronic see's dr Toni Puente    Restless legs syndrome     Type II or unspecified type diabetes mellitus without mention of complication, not stated as uncontrolled       Past Surgical History:   Procedure Laterality Date    CARDIAC PROCEDURE N/A 2023    Left heart cath / coronary angiography performed by Natalia Mcclendon DO at [address] [place] [address] N/A 2023    Percutaneous coronary intervention performed by Natalia Mcclendon DO at [address] [place] [address] CATH LAB    CARPAL TUNNEL RELEASE Bilateral     CATARACT REMOVAL WITH IMPLANT Right 12 1 15    CATARACT REMOVAL WITH IMPLANT Left 5 10 16    COLONOSCOPY      CORONARY ANGIOPLASTY WITH STENT PLACEMENT      2 stents    HYSTERECTOMY (CERVIX STATUS UNKNOWN)      vaginal    PARATHYROIDECTOMY      SPINE SURGERY      lumbar x5 rods & screws    TONSILLECTOMY      UPPER GASTROINTESTINAL ENDOSCOPY        Procedure/Surgery:  Percutaneous coronary intervention, Left heart cath / coronary angiography 23  Precautions:  Fall Risk, O2, Spinal precautions ( LLIF L4-L5 )  Equipment Needs:  TBD    SUBJECTIVE:    Pt lives with daughter in a 1 story home with 1 stairs to enter house from

## 2023-11-17 NOTE — PLAN OF CARE
Problem: Chronic Conditions and Co-morbidities  Goal: Patient's chronic conditions and co-morbidity symptoms are monitored and maintained or improved  Outcome: Progressing     Problem: Discharge Planning  Goal: Discharge to home or other facility with appropriate resources  Outcome: Progressing     Problem: Safety - Adult  Goal: Free from fall injury  Outcome: Progressing  Flowsheets (Taken 11/17/2023 1114)  Free From Fall Injury: Instruct family/caregiver on patient safety     Problem: ABCDS Injury Assessment  Goal: Absence of physical injury  Outcome: Progressing  Flowsheets (Taken 11/17/2023 1114)  Absence of Physical Injury: Implement safety measures based on patient assessment     Problem: Pain  Goal: Verbalizes/displays adequate comfort level or baseline comfort level  Outcome: Progressing     Problem: Skin/Tissue Integrity  Goal: Absence of new skin breakdown  Description: 1. Monitor for areas of redness and/or skin breakdown  2. Assess vascular access sites hourly  3. Every 4-6 hours minimum:  Change oxygen saturation probe site  4. Every 4-6 hours:  If on nasal continuous positive airway pressure, respiratory therapy assess nares and determine need for appliance change or resting period.   Outcome: Progressing     Problem: Nutrition Deficit:  Goal: Optimize nutritional status  Outcome: Progressing

## 2023-11-17 NOTE — PROGRESS NOTES
[facility] Quality Flow/Interdisciplinary Rounds Progress Note        Quality Flow Rounds held on November 17, 2023    Disciplines Attending:  Bedside Nurse, , , and Nursing Unit Leadership    Alla Cardoza was admitted on 11/13/2023  6:32 AM    Anticipated Discharge Date:       Disposition:    Jayjay Score:  Jayjay Scale Score: 15    Readmission Risk              Risk of Unplanned Readmission:  29           Discussed patient goal for the day, patient clinical progression, and barriers to discharge.   The following Goal(s) of the Day/Commitment(s) have been identified:  Diagnostics - Report Results and Labs - Report Results      Bárbara Lamar RN  November 17, 2023

## 2023-11-17 NOTE — HOME CARE
[facility] intake following. SOC orders will need to be placed PRIOR to discharge. Thank you! Flori Pedraza LPN  Central Intake Clinical Liaison  [facility].

## 2023-11-17 NOTE — PROGRESS NOTES
Tricuspid Valve: Normal RVSP. Interatrial Septum: Agitated saline study was positive without provocation. Right to left shunt was noted. Pericardium: No pericardial effusion. Contrast used: Definity. ASSESSMENT AND PLAN:  Patient Active Problem List   Diagnosis    Right cataract    Left cataract    Acute pulmonary embolism (HCC)    Unsteadiness on feet    Stage 3b chronic kidney disease ([address])    Sciatica of right side    Proteinuria    Post-laminectomy syndrome    Obesity    Mixed hyperlipidemia    Lumbosacral radiculopathy    Iron deficiency anemia    Hypomagnesemia    Hyperuricemia    Hypertension    Hyperkalemia    Type 2 diabetes mellitus with chronic kidney disease (HCC)    Disorder of skin    Chronic kidney disease    Benign hypertensive kidney disease with chronic kidney disease    Anemia in chronic kidney disease    Acute renal failure (HCC)    NSTEMI (non-ST elevated myocardial infarction) ([address])     1. NSTEMI/CP/CAD    Chart/labs/EKG/monitor reviewed. Patient reports having MI in 2004 and having a \"clot removed from RCA\". She had one stent to the RCA and is unsure of where the other is located. Records from [address] [place] [address] reviewed. Low risk stress 10/2022. Troponin elevated. Echo with severe LV dysfunction, RV okay. Cr has improved to 1.1. Ischemia evaluation by cath today revealed significant LAD disease that received a NIHARIKA x1.  of PDA noted, which will be medically managed. ASA/statin/BB/nitrate/plavix. 2. Acute systolic CHF:    Echo with severe LV dysfunction. Ischemic versus stress induced CMP. See above. Gently diuresing, reduce IV bumex to day. Cr stable. BB/hydralazine/nitrates. Attempt to add entresto and aldactone. Follow labs. 3. PAF:     Monitor. IV heparin for PE. Will be indicated for anticoagulation on DC due to PE. If evidence of PAF, then should opt for warfarin given MS. Heparin bridge to warfarin. 4. PE:    IV heparin. [facility] on discharge. 5. ID issues/Pneumonia: Per primary service. 6. DJD: Back surgery 11/9/2023.    7. Acute on CKD: Follow labs. 8. Anemia: Follow labs. 9. HTN: Observe. 10. Lipids: Statin. 11. DM: Observe. 12. GERD    13. Obesity     Available external charts reviewed. Available imaging and evaluations independently reviewed. Interviewed and discussed patient with available family. Discussed case with referring service and non-cardiology consultants. Greater than 50 minutes was spent counseling the patient, reviewing the rationale for the above recommendations and reviewing the patient's current medication list, problem list and results of all previously ordered testing. Becky Evans D.O.   Cardiologist  Cardiology, [facility]

## 2023-11-17 NOTE — PROGRESS NOTES
Cardiology notified of pt HR sustaining high 50s and due for coreg. Per Dr. Isabell Wick, hold 1700 dose.     Melanie Melara RN

## 2023-11-17 NOTE — PROGRESS NOTES
Pharmacy Consultation Note  (Warfarin Dosing and Monitoring)    Initial consult date: 11/15/23  Consulting Provider: Dr. Yareli Winter is a 70 y.o. female for whom pharmacy has been asked to manage warfarin therapy. Weight:   Wt Readings from Last 1 Encounters:   11/17/23 96 kg (211 lb 10.3 oz)       TSH:    Lab Results   Component Value Date/Time    TSH 1.570 12/28/2020 04:38 PM       Hepatic Function Panel:                            Lab Results   Component Value Date/Time    ALKPHOS 69 11/13/2023 08:37 AM    ALT 10 11/13/2023 08:37 AM    AST 26 11/13/2023 08:37 AM    PROT 6.2 11/13/2023 08:37 AM    BILITOT 0.6 11/13/2023 08:37 AM    BILIDIR 0.2 11/13/2023 08:37 AM    IBILI 0.4 11/13/2023 08:37 AM    LABALBU 2.8 11/13/2023 08:37 AM       Current warfarin drug-drug interactions include: No significant interactions    Recent Labs     11/15/23  0631 11/16/23  0524 11/17/23  0553   HGB 9.1* 10.2* 10.1*    297 314       Date Warfarin Dose INR Heparin or LMWH Comment   11/15 Hold for planned cath tomorrow X Heparin drip    11/16 2. 5 mg 1.3 UFH infusion Heart Cath today   11/17 2.5 mg 1.3 UFH infusion                    Assessment:  Patient is a 70 y.o. female on warfarin for New Pulmonary Embolism s/p recent LLIF of L4-5 with previous hardware removal  Per cardiology's note, warfarin preferred considering history of MS and ? PAF if evident (cath plan 11/16). 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 Goal INR 2 - 3.  11/16: INR = 1.3 today; pending heart cath today. Warfarin to start following heart cath pending results. 11/17: INR = 1.3, no change. UFH and warfarin resumed on 11/16 after cath (received NIHARIKA x1 to LAD). Plan:  Give warfarin 2.5 mg x1 again today (done). Daily PT/INR until the INR is stable within the therapeutic range.   Pharmacist will follow and monitor/adjust dosing

## 2023-11-17 NOTE — CONSULTS
Met with patient and discussed that their physician has ordered a referral to our outpatient Phase II Cardiac Rehabilitation program. Reviewed the benefits of cardiac rehabilitation based on their diagnosis and personal risk factors. Patient demonstrates mild interest in Cardiac Rehabilitation at this time. Cardiac Rehabilitation brochure provided to patient/family. The Cardiac Rehabilitation Program has been provided the patient's referral information and pertinent patient details and history. The patient may call [facility]  at [phone] for additional information or questions. Contact information for [address] [place] [address] and other choices close to the patient's residence have been provided in the discharge instructions so that the patient may call and schedule an appointment when cleared by their physician.  Thank you for the referral.

## 2023-11-17 NOTE — CARE COORDINATION
11/17/23  Transition of Care Update. Patient admitted for NSTEMI . Patient is POD#7 for LLIF of L4-5 with hardware removal from 11/9. Patient is being followed by ortho and Cardiology . Patient is s/p heart cath from yesterday. Patient has a bed rest order with perfect serve to physician to see if order can be discontinued and therapy be ordered. Patient is requesting a T[facility] stay [address] or [address] at the [place]. Call placed to [facility] with referral given and pending. Patient also agreeable to home care if therapy scores do not qualify for a rehab stay with no preference. Referral called to [facility] with [facility] Tuesday 11/21. Patient will need PT/OT and skilled nursing for coumadin and cpa med compliance and orders placed if going home. Patient is on 3 liters of 02 with attempts to wean. Walking pulse ox will need complete if unable to wean 02.  SW/Cm to follow for discharge planning once Therapy evals are complete. 3:45pm  Patient was accepted by [facility] who is starting pre-cert. Patient wants to go to the [facility]. FELIX and destination updated. Ambulette started and on the soft chart with [facility] PASRR complete.       Electronically signed by MAXWELL Ba on 11/17/2023 at 12:24 PM

## 2023-11-18 ENCOUNTER — APPOINTMENT (OUTPATIENT)
Dept: GENERAL RADIOLOGY | Age: 78
DRG: 321 | End: 2023-11-18
Attending: INTERNAL MEDICINE
Payer: MEDICARE

## 2023-11-18 LAB
BASOPHILS # BLD: 0 K/UL (ref 0–0.2)
BASOPHILS NFR BLD: 0 % (ref 0–2)
EOSINOPHIL # BLD: 0.17 K/UL (ref 0.05–0.5)
EOSINOPHILS RELATIVE PERCENT: 1 % (ref 0–6)
ERYTHROCYTE [DISTWIDTH] IN BLOOD BY AUTOMATED COUNT: 13.4 % (ref 11.5–15)
GLUCOSE BLD-MCNC: 196 MG/DL (ref 74–99)
GLUCOSE BLD-MCNC: 252 MG/DL (ref 74–99)
GLUCOSE BLD-MCNC: 259 MG/DL (ref 74–99)
GLUCOSE BLD-MCNC: 315 MG/DL (ref 74–99)
HCT VFR BLD AUTO: 32 % (ref 34–48)
HGB BLD-MCNC: 10.3 G/DL (ref 11.5–15.5)
INR PPP: 1.4
LYMPHOCYTES NFR BLD: 3.47 K/UL (ref 1.5–4)
LYMPHOCYTES RELATIVE PERCENT: 18 % (ref 20–42)
MCH RBC QN AUTO: 29.4 PG (ref 26–35)
MCHC RBC AUTO-ENTMCNC: 32.2 G/DL (ref 32–34.5)
MCV RBC AUTO: 91.4 FL (ref 80–99.9)
METAMYELOCYTES ABSOLUTE COUNT: 0.5 K/UL (ref 0–0.12)
METAMYELOCYTES: 3 % (ref 0–1)
MONOCYTES NFR BLD: 0.5 K/UL (ref 0.1–0.95)
MONOCYTES NFR BLD: 3 % (ref 2–12)
MYELOCYTES ABSOLUTE COUNT: 0.5 K/UL
MYELOCYTES: 3 %
NEUTROPHILS NFR BLD: 73 % (ref 43–80)
NEUTS SEG NFR BLD: 13.88 K/UL (ref 1.8–7.3)
PARTIAL THROMBOPLASTIN TIME: 121.3 SEC (ref 24.5–35.1)
PARTIAL THROMBOPLASTIN TIME: 52.1 SEC (ref 24.5–35.1)
PLATELET # BLD AUTO: 345 K/UL (ref 130–450)
PMV BLD AUTO: 10.8 FL (ref 7–12)
PROTHROMBIN TIME: 15.4 SEC (ref 9.3–12.4)
RBC # BLD AUTO: 3.5 M/UL (ref 3.5–5.5)
RBC # BLD: ABNORMAL 10*6/UL
WBC OTHER # BLD: 19 K/UL (ref 4.5–11.5)

## 2023-11-18 PROCEDURE — 6370000000 HC RX 637 (ALT 250 FOR IP)

## 2023-11-18 PROCEDURE — 2580000003 HC RX 258

## 2023-11-18 PROCEDURE — 6370000000 HC RX 637 (ALT 250 FOR IP): Performed by: INTERNAL MEDICINE

## 2023-11-18 PROCEDURE — 2500000003 HC RX 250 WO HCPCS: Performed by: INTERNAL MEDICINE

## 2023-11-18 PROCEDURE — 82962 GLUCOSE BLOOD TEST: CPT

## 2023-11-18 PROCEDURE — 71046 X-RAY EXAM CHEST 2 VIEWS: CPT

## 2023-11-18 PROCEDURE — 2140000000 HC CCU INTERMEDIATE R&B

## 2023-11-18 PROCEDURE — 6360000002 HC RX W HCPCS

## 2023-11-18 PROCEDURE — 93005 ELECTROCARDIOGRAM TRACING: CPT | Performed by: INTERNAL MEDICINE

## 2023-11-18 PROCEDURE — 85730 THROMBOPLASTIN TIME PARTIAL: CPT

## 2023-11-18 PROCEDURE — 36415 COLL VENOUS BLD VENIPUNCTURE: CPT

## 2023-11-18 PROCEDURE — 2700000000 HC OXYGEN THERAPY PER DAY

## 2023-11-18 PROCEDURE — 85025 COMPLETE CBC W/AUTO DIFF WBC: CPT

## 2023-11-18 PROCEDURE — 85610 PROTHROMBIN TIME: CPT

## 2023-11-18 PROCEDURE — 99233 SBSQ HOSP IP/OBS HIGH 50: CPT | Performed by: INTERNAL MEDICINE

## 2023-11-18 RX ORDER — WARFARIN SODIUM 3 MG/1
3 TABLET ORAL
Status: COMPLETED | OUTPATIENT
Start: 2023-11-18 | End: 2023-11-18

## 2023-11-18 RX ADMIN — CARVEDILOL 18.75 MG: 6.25 TABLET, FILM COATED ORAL at 17:15

## 2023-11-18 RX ADMIN — INSULIN LISPRO 8 UNITS: 100 INJECTION, SOLUTION INTRAVENOUS; SUBCUTANEOUS at 11:41

## 2023-11-18 RX ADMIN — HYDRALAZINE HYDROCHLORIDE 25 MG: 25 TABLET, FILM COATED ORAL at 06:36

## 2023-11-18 RX ADMIN — ASPIRIN 81 MG: 81 TABLET, COATED ORAL at 08:07

## 2023-11-18 RX ADMIN — SACUBITRIL AND VALSARTAN 1 TABLET: 24; 26 TABLET, FILM COATED ORAL at 21:42

## 2023-11-18 RX ADMIN — ATORVASTATIN CALCIUM 40 MG: 40 TABLET, FILM COATED ORAL at 21:42

## 2023-11-18 RX ADMIN — HYDROCODONE BITARTRATE AND ACETAMINOPHEN 1 TABLET: 5; 325 TABLET ORAL at 21:42

## 2023-11-18 RX ADMIN — HEPARIN SODIUM 17 UNITS/KG/HR: 10000 INJECTION, SOLUTION INTRAVENOUS at 05:03

## 2023-11-18 RX ADMIN — CARVEDILOL 18.75 MG: 6.25 TABLET, FILM COATED ORAL at 08:07

## 2023-11-18 RX ADMIN — HYDRALAZINE HYDROCHLORIDE 25 MG: 25 TABLET, FILM COATED ORAL at 14:28

## 2023-11-18 RX ADMIN — PETROLATUM: 420 OINTMENT TOPICAL at 08:08

## 2023-11-18 RX ADMIN — Medication 10 ML: at 08:08

## 2023-11-18 RX ADMIN — INSULIN LISPRO 8 UNITS: 100 INJECTION, SOLUTION INTRAVENOUS; SUBCUTANEOUS at 17:17

## 2023-11-18 RX ADMIN — PETROLATUM: 420 OINTMENT TOPICAL at 21:41

## 2023-11-18 RX ADMIN — INSULIN GLARGINE 10 UNITS: 100 INJECTION, SOLUTION SUBCUTANEOUS at 21:48

## 2023-11-18 RX ADMIN — INSULIN LISPRO 6 UNITS: 100 INJECTION, SOLUTION INTRAVENOUS; SUBCUTANEOUS at 11:40

## 2023-11-18 RX ADMIN — WARFARIN SODIUM 3 MG: 3 TABLET ORAL at 18:01

## 2023-11-18 RX ADMIN — INSULIN GLARGINE 10 UNITS: 100 INJECTION, SOLUTION SUBCUTANEOUS at 08:06

## 2023-11-18 RX ADMIN — BUMETANIDE 1 MG: 0.25 INJECTION INTRAMUSCULAR; INTRAVENOUS at 08:07

## 2023-11-18 RX ADMIN — CLOPIDOGREL BISULFATE 75 MG: 75 TABLET ORAL at 08:07

## 2023-11-18 RX ADMIN — SACUBITRIL AND VALSARTAN 1 TABLET: 24; 26 TABLET, FILM COATED ORAL at 08:07

## 2023-11-18 RX ADMIN — SPIRONOLACTONE 12.5 MG: 25 TABLET ORAL at 08:06

## 2023-11-18 RX ADMIN — HYDRALAZINE HYDROCHLORIDE 25 MG: 25 TABLET, FILM COATED ORAL at 21:46

## 2023-11-18 RX ADMIN — INSULIN LISPRO 4 UNITS: 100 INJECTION, SOLUTION INTRAVENOUS; SUBCUTANEOUS at 17:18

## 2023-11-18 ASSESSMENT — PAIN DESCRIPTION - DESCRIPTORS: DESCRIPTORS: ACHING;SORE;DULL

## 2023-11-18 ASSESSMENT — PAIN SCALES - GENERAL
PAINLEVEL_OUTOF10: 4
PAINLEVEL_OUTOF10: 7

## 2023-11-18 ASSESSMENT — PAIN DESCRIPTION - LOCATION: LOCATION: BACK

## 2023-11-18 ASSESSMENT — PAIN - FUNCTIONAL ASSESSMENT: PAIN_FUNCTIONAL_ASSESSMENT: ACTIVITIES ARE NOT PREVENTED

## 2023-11-18 ASSESSMENT — PAIN DESCRIPTION - ORIENTATION: ORIENTATION: LOWER

## 2023-11-18 NOTE — PLAN OF CARE
Problem: Safety - Adult  Goal: Free from fall injury  Outcome: Progressing  Flowsheets (Taken 11/18/2023 0000 by Rudolph Dotson RN)  Free From Fall Injury: Instruct family/caregiver on patient safety     Problem: ABCDS Injury Assessment  Goal: Absence of physical injury  Outcome: Progressing  Flowsheets (Taken 11/18/2023 0000 by Rudolph Dotson RN)  Absence of Physical Injury: Implement safety measures based on patient assessment     Problem: Pain  Goal: Verbalizes/displays adequate comfort level or baseline comfort level  Outcome: Progressing

## 2023-11-18 NOTE — PROGRESS NOTES
Pharmacy Consultation Note  (Warfarin Dosing and Monitoring)    Initial consult date: 11/15/23  Consulting Provider: Dr. Wilder Wing is a 70 y.o. female for whom pharmacy has been asked to manage warfarin therapy. Weight:   Wt Readings from Last 1 Encounters:   11/18/23 94.9 kg (209 lb 3.5 oz)       TSH:    Lab Results   Component Value Date/Time    TSH 1.570 12/28/2020 04:38 PM       Hepatic Function Panel:                            Lab Results   Component Value Date/Time    ALKPHOS 69 11/13/2023 08:37 AM    ALT 10 11/13/2023 08:37 AM    AST 26 11/13/2023 08:37 AM    PROT 6.2 11/13/2023 08:37 AM    BILITOT 0.6 11/13/2023 08:37 AM    BILIDIR 0.2 11/13/2023 08:37 AM    IBILI 0.4 11/13/2023 08:37 AM    LABALBU 2.8 11/13/2023 08:37 AM       Current warfarin drug-drug interactions include: No significant interactions    Recent Labs     11/16/23  0524 11/17/23  0553 11/18/23  0608   HGB 10.2* 10.1* 10.3*    314 345       Date Warfarin Dose INR Heparin or LMWH Comment   11/15 Hold for planned cath tomorrow X Heparin drip    11/16 2.5 mg 1.3 UFH infusion Heart Cath today   11/17 2.5 mg 1.3 UFH infusion    11/18 <3 mg> 1.4 UFH infusion              Assessment:  Patient is a 70 y.o. female on warfarin for New Pulmonary Embolism s/p recent LLIF of L4-5 with previous hardware removal. Per cardiology's note, warfarin preferred considering history of MS and ? PAF if evident (cath plan 11/16). 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 Goal INR 2 - 3.  11/16: INR = 1.3 today; pending heart cath today. Warfarin to start following heart cath pending results. 11/17: INR = 1.3, no change. UFH and warfarin resumed on 11/16 after cath (received NIHARIKA x1 to LAD). 11/18: INR = 1.4. UFH gtt continues.      Plan:  Give warfarin 3 mg x 1 today    Daily PT/INR until the INR is stable within the therapeutic range  Pharmacist will follow and monitor/adjust dosing as necessary    Delia Marsh, PharmD, [facility]  PGY1 Pharmacy Resident

## 2023-11-18 NOTE — PLAN OF CARE
Problem: Chronic Conditions and Co-morbidities  Goal: Patient's chronic conditions and co-morbidity symptoms are monitored and maintained or improved  11/17/2023 2021 by Dania Gabriel RN  Outcome: Progressing  11/17/2023 1115 by Kain De La Torre RN  Outcome: Progressing     Problem: Discharge Planning  Goal: Discharge to home or other facility with appropriate resources  11/17/2023 2021 by Dania Gabriel RN  Outcome: Progressing  11/17/2023 1115 by Kain De La Torre RN  Outcome: Progressing     Problem: ABCDS Injury Assessment  Goal: Absence of physical injury  11/17/2023 2021 by Dania Gabriel RN  Outcome: Progressing  11/17/2023 1115 by Kain De La Torre RN  Outcome: Progressing  Flowsheets (Taken 11/17/2023 1114)  Absence of Physical Injury: Implement safety measures based on patient assessment     Problem: Skin/Tissue Integrity  Goal: Absence of new skin breakdown  Description: 1. Monitor for areas of redness and/or skin breakdown  2. Assess vascular access sites hourly  3. Every 4-6 hours minimum:  Change oxygen saturation probe site  4. Every 4-6 hours:  If on nasal continuous positive airway pressure, respiratory therapy assess nares and determine need for appliance change or resting period.   11/17/2023 2021 by Dania Gabriel RN  Outcome: Progressing  11/17/2023 1115 by Kain De La Torre RN  Outcome: Progressing     Problem: Pain  Goal: Verbalizes/displays adequate comfort level or baseline comfort level  11/17/2023 2021 by Dania Gabriel RN  Outcome: Progressing  11/17/2023 1115 by Kain De La Torre RN  Outcome: Progressing     Problem: Nutrition Deficit:  Goal: Optimize nutritional status  11/17/2023 2021 by Dania Gabriel RN  Outcome: Progressing  11/17/2023 1115 by Kain eD La Torre RN  Outcome: Progressing

## 2023-11-18 NOTE — PROGRESS NOTES
[facility] Cardiology Inpatient Progress Note    Patient is a 70 y.o. female of Carolyn Hu MD seen in hospital follow up. Chief complaint: CHF/CP    HPI: No chest pain, respiratory distress, or palpitations. SR on telemetry.     Review of Systems:  Cardiac: As per HPI  General: No fever, chills  Pulmonary: As per HPI  HEENT: No visual disturbances, difficult swallowing  GI: No nausea, vomiting  : No dysuria, hematuria  Endocrine: No thyroid disease, +DM  Musculoskeletal: ADDISON x 4, no focal motor deficits  Skin: Intact, no rashes  Neuro: No headache, seizures  Psych: Currently with no depression, anxiety    Patient Active Problem List   Diagnosis    Right cataract    Left cataract    Acute pulmonary embolism (HCC)    Unsteadiness on feet    Stage 3b chronic kidney disease (HCC)    Sciatica of right side    Proteinuria    Post-laminectomy syndrome    Obesity    Mixed hyperlipidemia    Lumbosacral radiculopathy    Iron deficiency anemia    Hypomagnesemia    Hyperuricemia    Hypertension    Hyperkalemia    Type 2 diabetes mellitus with chronic kidney disease (HCC)    Disorder of skin    Chronic kidney disease    Benign hypertensive kidney disease with chronic kidney disease    Anemia in chronic kidney disease    Acute renal failure (HCC)    NSTEMI (non-ST elevated myocardial infarction) ([facility])       Allergies   Allergen Reactions    Doxycycline Calcium Anaphylaxis    Sulfa Antibiotics Anaphylaxis    Iodine Hives    Pcn [Penicillins] Rash       Current Facility-Administered Medications   Medication Dose Route Frequency Provider Last Rate Last Admin    warfarin (COUMADIN) tablet 3 mg  3 mg Oral Once Favian Fails, H        sacubitril-valsartan (ENTRESTO) 24-26 MG per tablet 1 tablet  1 tablet Oral BID Babette January T, DO   1 tablet at 11/18/23 0998    spironolactone (ALDACTONE) tablet 12.5 mg  12.5 mg Oral Daily Babette January T, DO   12.5 mg at 11/18/23 0806    bumetanide (BUMEX) injection 1 mg  1 mg IntraVENous Daily Kanu Raspberry, DO   1 mg at 11/18/23 0666    acetaminophen (TYLENOL) tablet 650 mg  650 mg Oral Q4H PRN Mp Luis Miguel, DO   650 mg at 11/16/23 1232    aspirin EC tablet 81 mg  81 mg Oral Daily [place] Luis Miguel, DO   81 mg at 11/18/23 9725    oxyCODONE-acetaminophen (PERCOCET) 5-325 MG per tablet 1 tablet  1 tablet Oral Q4H PRN Mp Luis Miguel, DO        clopidogrel (PLAVIX) tablet 75 mg  75 mg Oral Daily Milan Ross C, DO   75 mg at 11/18/23 5878    white petrolatum ointment   Topical BID Vibha Calderón MD   Given at 11/18/23 6131    HYDROcodone-acetaminophen (NORCO) 5-325 MG per tablet 1 tablet  1 tablet Oral Q6H PRN Vibha Calderón MD   1 tablet at 11/17/23 2211    white petrolatum ointment   Topical TID PRN Srini Mariee MD        hydrALAZINE (APRESOLINE) tablet 25 mg  25 mg Oral 3 times per day Kye Aida T, DO   25 mg at 11/18/23 0636    carvedilol (COREG) tablet 18.75 mg  18.75 mg Oral BID  Kye Aida T, DO   18.75 mg at 11/18/23 0019    insulin lispro (HUMALOG) injection vial 8 Units  8 Units SubCUTAneous TID  Zuhair Ribera MD   8 Units at 11/18/23 1141    warfarin placeholder: dosing by pharmacy   Other RX Placeholder Kody Gray MD        insulin glargine (LANTUS) injection vial 10 Units  10 Units SubCUTAneous BID Quique Negron MD   10 Units at 11/18/23 0806    sodium chloride flush 0.9 % injection 5-40 mL  5-40 mL IntraVENous 2 times per day Zuhair Ribera MD   10 mL at 11/18/23 0808    sodium chloride flush 0.9 % injection 5-40 mL  5-40 mL IntraVENous PRN Zuhair Ribera MD        0.9 % sodium chloride infusion   IntraVENous PRN Zuhair Ribera MD        magnesium sulfate 2000 mg in 50 mL IVPB premix  2,000 mg IntraVENous PRN Zuhair Ribera MD        ondansetron (ZOFRAN-ODT) disintegrating tablet 4 mg  4 mg Oral Q8H PRN Zuhair Ribera MD        Or    ondansetron (ZOFRAN) injection 4 mg  4 mg IntraVENous Q6H PRN uZhair Ribera MD

## 2023-11-18 NOTE — PROGRESS NOTES
Physical Therapy  [facility]  NAME: Shirley Mathis  : 1945  MRN: [id number]    Nursing cleared pt for physical therapy. Pt declined stating just returned to bed and reported increased fatigue. Continue with POC as able.      License [id number]  [id number]

## 2023-11-18 NOTE — PATIENT CARE CONFERENCE
P Quality Flow/Interdisciplinary Rounds Progress Note        Quality Flow Rounds held on November 18, 2023    Disciplines Attending:  Bedside Nurse and Nursing Unit Leadership    Sourav Pope was admitted on 11/13/2023  6:32 AM    Anticipated Discharge Date:       Disposition:    Jayjay Score:  Jayjay Scale Score: 16    Readmission Risk              Risk of Unplanned Readmission:  27           Discussed patient goal for the day, patient clinical progression, and barriers to discharge.   The following Goal(s) of the Day/Commitment(s) have been identified:  Labs - Report Results      Julee Dick RN  November 18, 2023

## 2023-11-19 LAB
BASOPHILS # BLD: 0 K/UL (ref 0–0.2)
BASOPHILS NFR BLD: 0 % (ref 0–2)
EOSINOPHIL # BLD: 0 K/UL (ref 0.05–0.5)
EOSINOPHILS RELATIVE PERCENT: 0 % (ref 0–6)
ERYTHROCYTE [DISTWIDTH] IN BLOOD BY AUTOMATED COUNT: 13.7 % (ref 11.5–15)
GLUCOSE BLD-MCNC: 133 MG/DL (ref 74–99)
GLUCOSE BLD-MCNC: 214 MG/DL (ref 74–99)
GLUCOSE BLD-MCNC: 221 MG/DL (ref 74–99)
GLUCOSE BLD-MCNC: 239 MG/DL (ref 74–99)
HCT VFR BLD AUTO: 36.5 % (ref 34–48)
HGB BLD-MCNC: 10.9 G/DL (ref 11.5–15.5)
INR PPP: 1.3
LYMPHOCYTES NFR BLD: 2.99 K/UL (ref 1.5–4)
LYMPHOCYTES RELATIVE PERCENT: 15 % (ref 20–42)
MCH RBC QN AUTO: 29.5 PG (ref 26–35)
MCHC RBC AUTO-ENTMCNC: 29.9 G/DL (ref 32–34.5)
MCV RBC AUTO: 98.9 FL (ref 80–99.9)
METAMYELOCYTES ABSOLUTE COUNT: 0.2 K/UL (ref 0–0.12)
METAMYELOCYTES: 1 % (ref 0–1)
MONOCYTES NFR BLD: 0.6 K/UL (ref 0.1–0.95)
MONOCYTES NFR BLD: 3 % (ref 2–12)
NEUTROPHILS NFR BLD: 81 % (ref 43–80)
NEUTS SEG NFR BLD: 16.12 K/UL (ref 1.8–7.3)
NUCLEATED RED BLOOD CELLS: 1 PER 100 WBC
PARTIAL THROMBOPLASTIN TIME: 73.8 SEC (ref 24.5–35.1)
PARTIAL THROMBOPLASTIN TIME: 89 SEC (ref 24.5–35.1)
PLATELET # BLD AUTO: 347 K/UL (ref 130–450)
PMV BLD AUTO: 10.9 FL (ref 7–12)
PROTHROMBIN TIME: 14.7 SEC (ref 9.3–12.4)
RBC # BLD AUTO: 3.69 M/UL (ref 3.5–5.5)
RBC # BLD: ABNORMAL 10*6/UL
WBC OTHER # BLD: 19.9 K/UL (ref 4.5–11.5)

## 2023-11-19 PROCEDURE — 85610 PROTHROMBIN TIME: CPT

## 2023-11-19 PROCEDURE — 6370000000 HC RX 637 (ALT 250 FOR IP): Performed by: INTERNAL MEDICINE

## 2023-11-19 PROCEDURE — 6370000000 HC RX 637 (ALT 250 FOR IP)

## 2023-11-19 PROCEDURE — 82962 GLUCOSE BLOOD TEST: CPT

## 2023-11-19 PROCEDURE — 85730 THROMBOPLASTIN TIME PARTIAL: CPT

## 2023-11-19 PROCEDURE — 85025 COMPLETE CBC W/AUTO DIFF WBC: CPT

## 2023-11-19 PROCEDURE — 93005 ELECTROCARDIOGRAM TRACING: CPT | Performed by: INTERNAL MEDICINE

## 2023-11-19 PROCEDURE — 2580000003 HC RX 258

## 2023-11-19 PROCEDURE — 99233 SBSQ HOSP IP/OBS HIGH 50: CPT | Performed by: INTERNAL MEDICINE

## 2023-11-19 PROCEDURE — 6360000002 HC RX W HCPCS

## 2023-11-19 PROCEDURE — 2500000003 HC RX 250 WO HCPCS: Performed by: INTERNAL MEDICINE

## 2023-11-19 PROCEDURE — 36415 COLL VENOUS BLD VENIPUNCTURE: CPT

## 2023-11-19 PROCEDURE — 2140000000 HC CCU INTERMEDIATE R&B

## 2023-11-19 RX ORDER — WARFARIN SODIUM 5 MG/1
5 TABLET ORAL
Status: COMPLETED | OUTPATIENT
Start: 2023-11-19 | End: 2023-11-19

## 2023-11-19 RX ADMIN — INSULIN LISPRO 8 UNITS: 100 INJECTION, SOLUTION INTRAVENOUS; SUBCUTANEOUS at 08:51

## 2023-11-19 RX ADMIN — ATORVASTATIN CALCIUM 40 MG: 40 TABLET, FILM COATED ORAL at 21:55

## 2023-11-19 RX ADMIN — HYDROCODONE BITARTRATE AND ACETAMINOPHEN 1 TABLET: 5; 325 TABLET ORAL at 21:58

## 2023-11-19 RX ADMIN — INSULIN GLARGINE 10 UNITS: 100 INJECTION, SOLUTION SUBCUTANEOUS at 08:51

## 2023-11-19 RX ADMIN — CARVEDILOL 18.75 MG: 6.25 TABLET, FILM COATED ORAL at 16:57

## 2023-11-19 RX ADMIN — HEPARIN SODIUM 17 UNITS/KG/HR: 10000 INJECTION, SOLUTION INTRAVENOUS at 19:08

## 2023-11-19 RX ADMIN — PETROLATUM: 420 OINTMENT TOPICAL at 22:01

## 2023-11-19 RX ADMIN — INSULIN LISPRO 8 UNITS: 100 INJECTION, SOLUTION INTRAVENOUS; SUBCUTANEOUS at 16:57

## 2023-11-19 RX ADMIN — INSULIN GLARGINE 10 UNITS: 100 INJECTION, SOLUTION SUBCUTANEOUS at 22:00

## 2023-11-19 RX ADMIN — HYDRALAZINE HYDROCHLORIDE 25 MG: 25 TABLET, FILM COATED ORAL at 22:00

## 2023-11-19 RX ADMIN — HEPARIN SODIUM 17 UNITS/KG/HR: 10000 INJECTION, SOLUTION INTRAVENOUS at 00:00

## 2023-11-19 RX ADMIN — BUMETANIDE 1 MG: 0.25 INJECTION INTRAMUSCULAR; INTRAVENOUS at 08:51

## 2023-11-19 RX ADMIN — CLOPIDOGREL BISULFATE 75 MG: 75 TABLET ORAL at 08:51

## 2023-11-19 RX ADMIN — INSULIN LISPRO 2 UNITS: 100 INJECTION, SOLUTION INTRAVENOUS; SUBCUTANEOUS at 12:34

## 2023-11-19 RX ADMIN — HYDRALAZINE HYDROCHLORIDE 25 MG: 25 TABLET, FILM COATED ORAL at 05:50

## 2023-11-19 RX ADMIN — HYDROCODONE BITARTRATE AND ACETAMINOPHEN 1 TABLET: 5; 325 TABLET ORAL at 05:50

## 2023-11-19 RX ADMIN — SPIRONOLACTONE 12.5 MG: 25 TABLET ORAL at 08:50

## 2023-11-19 RX ADMIN — SACUBITRIL AND VALSARTAN 1 TABLET: 24; 26 TABLET, FILM COATED ORAL at 21:56

## 2023-11-19 RX ADMIN — HYDRALAZINE HYDROCHLORIDE 25 MG: 25 TABLET, FILM COATED ORAL at 15:45

## 2023-11-19 RX ADMIN — INSULIN LISPRO 8 UNITS: 100 INJECTION, SOLUTION INTRAVENOUS; SUBCUTANEOUS at 12:33

## 2023-11-19 RX ADMIN — HYDROCODONE BITARTRATE AND ACETAMINOPHEN 1 TABLET: 5; 325 TABLET ORAL at 15:44

## 2023-11-19 RX ADMIN — CARVEDILOL 18.75 MG: 6.25 TABLET, FILM COATED ORAL at 08:54

## 2023-11-19 RX ADMIN — Medication 10 ML: at 08:52

## 2023-11-19 RX ADMIN — SACUBITRIL AND VALSARTAN 1 TABLET: 24; 26 TABLET, FILM COATED ORAL at 08:50

## 2023-11-19 RX ADMIN — INSULIN LISPRO 2 UNITS: 100 INJECTION, SOLUTION INTRAVENOUS; SUBCUTANEOUS at 16:57

## 2023-11-19 RX ADMIN — ASPIRIN 81 MG: 81 TABLET, COATED ORAL at 08:51

## 2023-11-19 RX ADMIN — WARFARIN SODIUM 5 MG: 5 TABLET ORAL at 17:32

## 2023-11-19 RX ADMIN — INSULIN LISPRO 2 UNITS: 100 INJECTION, SOLUTION INTRAVENOUS; SUBCUTANEOUS at 08:52

## 2023-11-19 ASSESSMENT — PAIN SCALES - GENERAL
PAINLEVEL_OUTOF10: 10
PAINLEVEL_OUTOF10: 8
PAINLEVEL_OUTOF10: 5
PAINLEVEL_OUTOF10: 4

## 2023-11-19 ASSESSMENT — PAIN DESCRIPTION - DESCRIPTORS: DESCRIPTORS: ACHING;DISCOMFORT;CRAMPING

## 2023-11-19 ASSESSMENT — PAIN DESCRIPTION - ORIENTATION
ORIENTATION: LEFT

## 2023-11-19 ASSESSMENT — PAIN DESCRIPTION - LOCATION
LOCATION: LEG

## 2023-11-19 NOTE — PROGRESS NOTES
Pt let me know that labs were drawn from arm with heparin drip running. Ordered repeat aptt. Lab did not come redraw. Called down and instructed lab that it must be redrawn from the OPPOSITE arm as heparin. Spoke with lab supervisor. Lab was placed as stat. Await redraw.

## 2023-11-19 NOTE — PLAN OF CARE

## 2023-11-19 NOTE — PLAN OF CARE
Problem: Chronic Conditions and Co-morbidities  Goal: Patient's chronic conditions and co-morbidity symptoms are monitored and maintained or improved  11/19/2023 1457 by Alison Hi RN  Outcome: Progressing  11/19/2023 0203 by Raji Johnson RN  Outcome: Progressing     Problem: Discharge Planning  Goal: Discharge to home or other facility with appropriate resources  11/19/2023 1457 by Alison Hi RN  Outcome: Progressing  11/19/2023 0203 by Raji Johnson RN  Outcome: Progressing     Problem: Safety - Adult  Goal: Free from fall injury  11/19/2023 1457 by Alison Hi RN  Outcome: Progressing  11/19/2023 0203 by Raji Johnson RN  Outcome: Progressing     Problem: ABCDS Injury Assessment  Goal: Absence of physical injury  11/19/2023 1457 by Alison Hi RN  Outcome: Progressing  11/19/2023 0203 by Raji Johnson RN  Outcome: Progressing     Problem: Pain  Goal: Verbalizes/displays adequate comfort level or baseline comfort level  11/19/2023 1457 by Alison Hi RN  Outcome: Progressing  11/19/2023 0203 by Raji Johnson RN  Outcome: Progressing     Problem: Skin/Tissue Integrity  Goal: Absence of new skin breakdown  Description: 1. Monitor for areas of redness and/or skin breakdown  2. Assess vascular access sites hourly  3. Every 4-6 hours minimum:  Change oxygen saturation probe site  4. Every 4-6 hours:  If on nasal continuous positive airway pressure, respiratory therapy assess nares and determine need for appliance change or resting period.   11/19/2023 0203 by Raji Johnson RN  Outcome: Progressing     Problem: Nutrition Deficit:  Goal: Optimize nutritional status  11/19/2023 1457 by Alison Hi RN  Outcome: Progressing  11/19/2023 0203 by Raji Johnson RN  Outcome: Progressing

## 2023-11-19 NOTE — PROGRESS NOTES
Per pt, phlebotomy lupe aptt out of same arm as heparin. Called lab to re draw.  Stat order placed for redraw

## 2023-11-19 NOTE — PATIENT CARE CONFERENCE
P Quality Flow/Interdisciplinary Rounds Progress Note        Quality Flow Rounds held on November 19, 2023    Disciplines Attending:  Bedside Nurse and Nursing Unit Leadership    Imelda Henriquez was admitted on 11/13/2023  6:32 AM    Anticipated Discharge Date:       Disposition:    Jayjay Score:  Jayjay Scale Score: 17    Readmission Risk              Risk of Unplanned Readmission:  25           Discussed patient goal for the day, patient clinical progression, and barriers to discharge.   The following Goal(s) of the Day/Commitment(s) have been identified:  Labs - Report Results and increase activity      Toshia Jasmine RN  November 19, 2023

## 2023-11-19 NOTE — PROGRESS NOTES
Pharmacy Consultation Note  (Warfarin Dosing and Monitoring)    Initial consult date: 11/15/23  Consulting Provider: Dr. Riana Skinner is a 70 y.o. female for whom pharmacy has been asked to manage warfarin therapy. Weight:   Wt Readings from Last 1 Encounters:   11/19/23 94.7 kg (208 lb 12.4 oz)       TSH:    Lab Results   Component Value Date/Time    TSH 1.570 12/28/2020 04:38 PM       Hepatic Function Panel:                            Lab Results   Component Value Date/Time    ALKPHOS 69 11/13/2023 08:37 AM    ALT 10 11/13/2023 08:37 AM    AST 26 11/13/2023 08:37 AM    PROT 6.2 11/13/2023 08:37 AM    BILITOT 0.6 11/13/2023 08:37 AM    BILIDIR 0.2 11/13/2023 08:37 AM    IBILI 0.4 11/13/2023 08:37 AM    LABALBU 2.8 11/13/2023 08:37 AM       Current warfarin drug-drug interactions include: No significant interactions    Recent Labs     11/17/23  0553 11/18/23  0608 11/19/23  0719   HGB 10.1* 10.3* 10.9*    345 347       Date Warfarin Dose INR Heparin or LMWH Comment   11/15 Hold for planned cath tomorrow X Heparin drip    11/16 2.5 mg 1.3 UFH infusion Heart Cath today   11/17 2.5 mg 1.3 UFH infusion    11/18 3 mg 1.4 UFH infusion     11/19 5 mg 1.3 UFH infusion      Assessment:  Patient is a 70 y.o. female on warfarin for New Pulmonary Embolism s/p recent LLIF of L4-5 with previous hardware removal. Per cardiology's note, warfarin preferred considering history of MS and ? PAF if evident (cath plan 11/16). 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 Goal INR 2 - 3. Plan:   Will give warfarin 5 mg x1 today   Daily PT/INR until the INR is stable within the therapeutic range  Pharmacist will follow and monitor/adjust dosing as necessary    Casey Fortune PharmD, BCPS, BCCCP 11/19/2023 10:22 AM

## 2023-11-19 NOTE — PROGRESS NOTES
[facility] Inpatient Progress Note    Patient is a 70 y.o. female of Colleen Castro MD seen in hospital follow up. Chief complaint: CHF/CP    HPI: No chest pain, respiratory distress, or palpitations. SR on telemetry.     Review of Systems:  Cardiac: As per HPI  General: No fever, chills  Pulmonary: As per HPI  HEENT: No visual disturbances, difficult swallowing  GI: No nausea, vomiting  : No dysuria, hematuria  Endocrine: No thyroid disease, +DM  Musculoskeletal: ADDISON x 4, no focal motor deficits  Skin: Intact, no rashes  Neuro: No headache, seizures  Psych: Currently with no depression, anxiety    Patient Active Problem List   Diagnosis    Right cataract    Left cataract    Acute pulmonary embolism (HCC)    Unsteadiness on feet    Stage 3b chronic kidney disease (HCC)    Sciatica of right side    Proteinuria    Post-laminectomy syndrome    Obesity    Mixed hyperlipidemia    Lumbosacral radiculopathy    Iron deficiency anemia    Hypomagnesemia    Hyperuricemia    Hypertension    Hyperkalemia    Type 2 diabetes mellitus with chronic kidney disease (HCC)    Disorder of skin    Chronic kidney disease    Benign hypertensive kidney disease with chronic kidney disease    Anemia in chronic kidney disease    Acute renal failure (HCC)    NSTEMI (non-ST elevated myocardial infarction) (HCC)       Allergies   Allergen Reactions    Doxycycline Calcium Anaphylaxis    Sulfa Antibiotics Anaphylaxis    Iodine Hives    Pcn [Penicillins] Rash       Current Facility-Administered Medications   Medication Dose Route Frequency Provider Last Rate Last Admin    warfarin (COUMADIN) tablet 5 mg  5 mg Oral Once Timothy Joseph MD        sacubitril-valsartan (ENTRESTO) 24-26 MG per tablet 1 tablet  1 tablet Oral BID Lenora Lewis T, DO   1 tablet at 11/19/23 0850    spironolactone (ALDACTONE) tablet 12.5 mg  12.5 mg Oral Daily Lenora Lewis T, DO   12.5 mg at 11/19/23 0850    bumetanide (BUMEX) injection 1 mg  1 mg IntraVENous 50 minutes was spent counseling the patient, reviewing the rationale for the above recommendations and reviewing the patient's current medication list, problem list and results of all previously ordered testing.     Fran Galvez MD  [facility]) Cardiology

## 2023-11-20 PROBLEM — I25.5 ISCHEMIC CARDIOMYOPATHY: Status: ACTIVE | Noted: 2023-11-20

## 2023-11-20 PROBLEM — I34.0 NONRHEUMATIC MITRAL VALVE REGURGITATION: Status: ACTIVE | Noted: 2023-11-20

## 2023-11-20 PROBLEM — I48.0 PAROXYSMAL ATRIAL FIBRILLATION (HCC): Status: ACTIVE | Noted: 2023-11-20

## 2023-11-20 PROBLEM — Z79.01 CHRONIC ANTICOAGULATION: Status: ACTIVE | Noted: 2023-11-20

## 2023-11-20 PROBLEM — I34.2 NONRHEUMATIC MITRAL VALVE STENOSIS: Status: ACTIVE | Noted: 2023-11-20

## 2023-11-20 PROBLEM — I25.119 CORONARY ARTERY DISEASE INVOLVING NATIVE CORONARY ARTERY OF NATIVE HEART WITH ANGINA PECTORIS (HCC): Status: ACTIVE | Noted: 2023-11-20

## 2023-11-20 PROBLEM — I50.21 ACUTE SYSTOLIC (CONGESTIVE) HEART FAILURE (HCC): Status: ACTIVE | Noted: 2023-11-20

## 2023-11-20 LAB
BASOPHILS # BLD: 0.17 K/UL (ref 0–0.2)
BASOPHILS NFR BLD: 1 % (ref 0–2)
EKG ATRIAL RATE: 61 BPM
EKG ATRIAL RATE: 61 BPM
EKG ATRIAL RATE: 63 BPM
EKG P AXIS: 53 DEGREES
EKG P AXIS: 70 DEGREES
EKG P-R INTERVAL: 134 MS
EKG P-R INTERVAL: 142 MS
EKG P-R INTERVAL: 152 MS
EKG Q-T INTERVAL: 410 MS
EKG Q-T INTERVAL: 436 MS
EKG Q-T INTERVAL: 484 MS
EKG QRS DURATION: 106 MS
EKG QRS DURATION: 92 MS
EKG QRS DURATION: 98 MS
EKG QTC CALCULATION (BAZETT): 412 MS
EKG QTC CALCULATION (BAZETT): 446 MS
EKG QTC CALCULATION (BAZETT): 487 MS
EKG R AXIS: -169 DEGREES
EKG R AXIS: -3 DEGREES
EKG R AXIS: 4 DEGREES
EKG T AXIS: 140 DEGREES
EKG T AXIS: 149 DEGREES
EKG T AXIS: 53 DEGREES
EKG VENTRICULAR RATE: 61 BPM
EKG VENTRICULAR RATE: 61 BPM
EKG VENTRICULAR RATE: 63 BPM
EOSINOPHIL # BLD: 0.66 K/UL (ref 0.05–0.5)
EOSINOPHILS RELATIVE PERCENT: 4 % (ref 0–6)
ERYTHROCYTE [DISTWIDTH] IN BLOOD BY AUTOMATED COUNT: 13.8 % (ref 11.5–15)
GLUCOSE BLD-MCNC: 147 MG/DL (ref 74–99)
GLUCOSE BLD-MCNC: 175 MG/DL (ref 74–99)
GLUCOSE BLD-MCNC: 192 MG/DL (ref 74–99)
GLUCOSE BLD-MCNC: 198 MG/DL (ref 74–99)
GLUCOSE BLD-MCNC: 215 MG/DL (ref 74–99)
HCT VFR BLD AUTO: 30.5 % (ref 34–48)
HGB BLD-MCNC: 9.9 G/DL (ref 11.5–15.5)
INR PPP: 1.6
LYMPHOCYTES NFR BLD: 3.32 K/UL (ref 1.5–4)
LYMPHOCYTES RELATIVE PERCENT: 17 % (ref 20–42)
MCH RBC QN AUTO: 30.2 PG (ref 26–35)
MCHC RBC AUTO-ENTMCNC: 32.5 G/DL (ref 32–34.5)
MCV RBC AUTO: 93 FL (ref 80–99.9)
MICROORGANISM SPEC CULT: NORMAL
MICROORGANISM SPEC CULT: NORMAL
MONOCYTES NFR BLD: 0.66 K/UL (ref 0.1–0.95)
MONOCYTES NFR BLD: 4 % (ref 2–12)
MYELOCYTES ABSOLUTE COUNT: 0.5 K/UL
MYELOCYTES: 3 %
NEUTROPHILS NFR BLD: 71 % (ref 43–80)
NEUTS SEG NFR BLD: 13.62 K/UL (ref 1.8–7.3)
PARTIAL THROMBOPLASTIN TIME: 166 SEC (ref 24.5–35.1)
PLATELET # BLD AUTO: 358 K/UL (ref 130–450)
PMV BLD AUTO: 10.8 FL (ref 7–12)
PROMYELOCYTES ABSOLUTE COUNT: 0.17 K/UL
PROMYELOCYTES: 1 %
PROTHROMBIN TIME: 17.6 SEC (ref 9.3–12.4)
RBC # BLD AUTO: 3.28 M/UL (ref 3.5–5.5)
RBC # BLD: ABNORMAL 10*6/UL
SERVICE CMNT-IMP: NORMAL
SERVICE CMNT-IMP: NORMAL
SPECIMEN DESCRIPTION: NORMAL
SPECIMEN DESCRIPTION: NORMAL
WBC OTHER # BLD: 19.1 K/UL (ref 4.5–11.5)

## 2023-11-20 PROCEDURE — 85730 THROMBOPLASTIN TIME PARTIAL: CPT

## 2023-11-20 PROCEDURE — 6370000000 HC RX 637 (ALT 250 FOR IP): Performed by: INTERNAL MEDICINE

## 2023-11-20 PROCEDURE — 99232 SBSQ HOSP IP/OBS MODERATE 35: CPT | Performed by: INTERNAL MEDICINE

## 2023-11-20 PROCEDURE — 6370000000 HC RX 637 (ALT 250 FOR IP)

## 2023-11-20 PROCEDURE — 6360000002 HC RX W HCPCS: Performed by: INTERNAL MEDICINE

## 2023-11-20 PROCEDURE — 93010 ELECTROCARDIOGRAM REPORT: CPT | Performed by: INTERNAL MEDICINE

## 2023-11-20 PROCEDURE — 85610 PROTHROMBIN TIME: CPT

## 2023-11-20 PROCEDURE — 36415 COLL VENOUS BLD VENIPUNCTURE: CPT

## 2023-11-20 PROCEDURE — 82962 GLUCOSE BLOOD TEST: CPT

## 2023-11-20 PROCEDURE — 2580000003 HC RX 258

## 2023-11-20 PROCEDURE — 2140000000 HC CCU INTERMEDIATE R&B

## 2023-11-20 PROCEDURE — 93005 ELECTROCARDIOGRAM TRACING: CPT | Performed by: INTERNAL MEDICINE

## 2023-11-20 PROCEDURE — 85025 COMPLETE CBC W/AUTO DIFF WBC: CPT

## 2023-11-20 PROCEDURE — 2500000003 HC RX 250 WO HCPCS: Performed by: INTERNAL MEDICINE

## 2023-11-20 RX ORDER — HYDRALAZINE HYDROCHLORIDE 25 MG/1
25 TABLET, FILM COATED ORAL EVERY 8 HOURS SCHEDULED
Qty: 90 TABLET | Refills: 3
Start: 2023-11-20

## 2023-11-20 RX ORDER — CYCLOBENZAPRINE HCL 10 MG
10 TABLET ORAL 3 TIMES DAILY PRN
Status: DISCONTINUED | OUTPATIENT
Start: 2023-11-20 | End: 2023-11-21 | Stop reason: HOSPADM

## 2023-11-20 RX ORDER — CARVEDILOL 6.25 MG/1
18.75 TABLET ORAL 2 TIMES DAILY WITH MEALS
Qty: 60 TABLET | Refills: 3
Start: 2023-11-20 | End: 2023-11-21 | Stop reason: HOSPADM

## 2023-11-20 RX ORDER — SPIRONOLACTONE 25 MG/1
12.5 TABLET ORAL DAILY
Qty: 30 TABLET | Refills: 3
Start: 2023-11-20

## 2023-11-20 RX ORDER — INSULIN GLARGINE 100 [IU]/ML
10 INJECTION, SOLUTION SUBCUTANEOUS 2 TIMES DAILY
Qty: 10 ML | Refills: 3
Start: 2023-11-20

## 2023-11-20 RX ORDER — WARFARIN SODIUM 5 MG/1
5 TABLET ORAL
Status: COMPLETED | OUTPATIENT
Start: 2023-11-20 | End: 2023-11-20

## 2023-11-20 RX ORDER — BUMETANIDE 1 MG/1
1 TABLET ORAL DAILY
Qty: 30 TABLET | Refills: 0
Start: 2023-11-20

## 2023-11-20 RX ORDER — BUMETANIDE 1 MG/1
1 TABLET ORAL DAILY
Status: DISCONTINUED | OUTPATIENT
Start: 2023-11-21 | End: 2023-11-21 | Stop reason: HOSPADM

## 2023-11-20 RX ORDER — CLOPIDOGREL BISULFATE 75 MG/1
75 TABLET ORAL DAILY
Qty: 30 TABLET | Refills: 3
Start: 2023-11-20

## 2023-11-20 RX ORDER — CARVEDILOL 25 MG/1
25 TABLET ORAL 2 TIMES DAILY WITH MEALS
Status: DISCONTINUED | OUTPATIENT
Start: 2023-11-21 | End: 2023-11-21 | Stop reason: HOSPADM

## 2023-11-20 RX ORDER — HYDROCODONE BITARTRATE AND ACETAMINOPHEN 5; 325 MG/1; MG/1
1 TABLET ORAL EVERY 6 HOURS PRN
Qty: 12 TABLET | Refills: 0 | Status: SHIPPED | OUTPATIENT
Start: 2023-11-20 | End: 2023-11-25

## 2023-11-20 RX ORDER — ENOXAPARIN SODIUM 100 MG/ML
1 INJECTION SUBCUTANEOUS 2 TIMES DAILY
Status: DISCONTINUED | OUTPATIENT
Start: 2023-11-20 | End: 2023-11-21 | Stop reason: HOSPADM

## 2023-11-20 RX ADMIN — SACUBITRIL AND VALSARTAN 1 TABLET: 24; 26 TABLET, FILM COATED ORAL at 21:48

## 2023-11-20 RX ADMIN — ATORVASTATIN CALCIUM 40 MG: 40 TABLET, FILM COATED ORAL at 21:43

## 2023-11-20 RX ADMIN — SACUBITRIL AND VALSARTAN 1 TABLET: 24; 26 TABLET, FILM COATED ORAL at 08:26

## 2023-11-20 RX ADMIN — INSULIN GLARGINE 10 UNITS: 100 INJECTION, SOLUTION SUBCUTANEOUS at 21:48

## 2023-11-20 RX ADMIN — CYCLOBENZAPRINE 10 MG: 10 TABLET, FILM COATED ORAL at 21:43

## 2023-11-20 RX ADMIN — INSULIN LISPRO 8 UNITS: 100 INJECTION, SOLUTION INTRAVENOUS; SUBCUTANEOUS at 17:21

## 2023-11-20 RX ADMIN — HYDROCODONE BITARTRATE AND ACETAMINOPHEN 1 TABLET: 5; 325 TABLET ORAL at 04:25

## 2023-11-20 RX ADMIN — INSULIN LISPRO 8 UNITS: 100 INJECTION, SOLUTION INTRAVENOUS; SUBCUTANEOUS at 12:37

## 2023-11-20 RX ADMIN — CARVEDILOL 18.75 MG: 6.25 TABLET, FILM COATED ORAL at 08:27

## 2023-11-20 RX ADMIN — CLOPIDOGREL BISULFATE 75 MG: 75 TABLET ORAL at 08:29

## 2023-11-20 RX ADMIN — ASPIRIN 81 MG: 81 TABLET, COATED ORAL at 08:27

## 2023-11-20 RX ADMIN — ENOXAPARIN SODIUM 90 MG: 100 INJECTION SUBCUTANEOUS at 21:42

## 2023-11-20 RX ADMIN — CARVEDILOL 18.75 MG: 6.25 TABLET, FILM COATED ORAL at 17:19

## 2023-11-20 RX ADMIN — INSULIN LISPRO 8 UNITS: 100 INJECTION, SOLUTION INTRAVENOUS; SUBCUTANEOUS at 08:28

## 2023-11-20 RX ADMIN — BUMETANIDE 1 MG: 0.25 INJECTION INTRAMUSCULAR; INTRAVENOUS at 08:29

## 2023-11-20 RX ADMIN — HYDRALAZINE HYDROCHLORIDE 25 MG: 25 TABLET, FILM COATED ORAL at 04:25

## 2023-11-20 RX ADMIN — Medication 10 ML: at 22:23

## 2023-11-20 RX ADMIN — HYDROCODONE BITARTRATE AND ACETAMINOPHEN 1 TABLET: 5; 325 TABLET ORAL at 22:21

## 2023-11-20 RX ADMIN — WARFARIN SODIUM 5 MG: 5 TABLET ORAL at 17:20

## 2023-11-20 RX ADMIN — INSULIN LISPRO 2 UNITS: 100 INJECTION, SOLUTION INTRAVENOUS; SUBCUTANEOUS at 17:20

## 2023-11-20 RX ADMIN — INSULIN GLARGINE 10 UNITS: 100 INJECTION, SOLUTION SUBCUTANEOUS at 08:28

## 2023-11-20 RX ADMIN — PETROLATUM: 420 OINTMENT TOPICAL at 08:30

## 2023-11-20 RX ADMIN — SPIRONOLACTONE 12.5 MG: 25 TABLET ORAL at 08:26

## 2023-11-20 RX ADMIN — PETROLATUM: 420 OINTMENT TOPICAL at 22:22

## 2023-11-20 RX ADMIN — HYDRALAZINE HYDROCHLORIDE 25 MG: 25 TABLET, FILM COATED ORAL at 14:20

## 2023-11-20 RX ADMIN — HYDRALAZINE HYDROCHLORIDE 25 MG: 25 TABLET, FILM COATED ORAL at 21:48

## 2023-11-20 ASSESSMENT — PAIN DESCRIPTION - DESCRIPTORS
DESCRIPTORS: SPASM;SQUEEZING;DISCOMFORT
DESCRIPTORS: ACHING;DISCOMFORT;SHARP

## 2023-11-20 ASSESSMENT — PAIN DESCRIPTION - LOCATION
LOCATION: LEG
LOCATION: LEG

## 2023-11-20 ASSESSMENT — PAIN DESCRIPTION - ORIENTATION
ORIENTATION: LEFT
ORIENTATION: LEFT

## 2023-11-20 ASSESSMENT — PAIN SCALES - GENERAL
PAINLEVEL_OUTOF10: 7
PAINLEVEL_OUTOF10: 4

## 2023-11-20 ASSESSMENT — PAIN - FUNCTIONAL ASSESSMENT: PAIN_FUNCTIONAL_ASSESSMENT: PREVENTS OR INTERFERES SOME ACTIVE ACTIVITIES AND ADLS

## 2023-11-20 NOTE — PROGRESS NOTES
Patient is seen in follow-up for chest pain and CHF    Subjective:     Ms. Suzie Marquez feels okay today, denies chest pain or shortness of breath  Laying in bed no apparent distress    ROS:  CONSTITUTIONAL:  negative for  fevers, chills  HEENT:  negative for earaches, nasal congestion and epistaxis  RESPIRATORY:  negative for  dry cough, cough with sputum,wheezing and hemoptysis  GASTROINTESTINAL:  negative for nausea, vomiting  MUSCULOSKELETAL:  negative for  myalgias, arthralgias  NEUROLOGICAL:  negative for visual disturbance, dysphagia    Medication side effects: none    Scheduled Meds:   sacubitril-valsartan  1 tablet Oral BID    spironolactone  12.5 mg Oral Daily    bumetanide  1 mg IntraVENous Daily    aspirin  81 mg Oral Daily    clopidogrel  75 mg Oral Daily    white petrolatum   Topical BID    hydrALAZINE  25 mg Oral 3 times per day    carvedilol  18.75 mg Oral BID WC    insulin lispro  8 Units SubCUTAneous TID     warfarin placeholder: dosing by pharmacy   Other RX Placeholder    insulin glargine  10 Units SubCUTAneous BID    sodium chloride flush  5-40 mL IntraVENous 2 times per day    atorvastatin  40 mg Oral Nightly    insulin lispro  0-8 Units SubCUTAneous TID WC    insulin lispro  0-4 Units SubCUTAneous Nightly     Continuous Infusions:   sodium chloride      heparin (PORCINE) Infusion 14 Units/kg/hr (11/20/23 1237)    dextrose       PRN Meds:cyclobenzaprine, acetaminophen, oxyCODONE-acetaminophen, HYDROcodone 5 mg - acetaminophen, white petrolatum, sodium chloride flush, sodium chloride, magnesium sulfate, ondansetron **OR** ondansetron, polyethylene glycol, perflutren lipid microspheres, heparin (porcine), heparin (porcine), glucose, dextrose bolus **OR** dextrose bolus, glucagon (rDNA), dextrose    I/O last 3 completed shifts:   In: 120 [P.O.:120]  Out: 1525 [Urine:1525]  I/O this shift:  In: 360 [P.O.:360]  Out: 800 [Urine:800]      Objective:      Physical Exam:   BP (!) 130/58   Pulse 81

## 2023-11-20 NOTE — PLAN OF CARE
Problem: Chronic Conditions and Co-morbidities  Goal: Patient's chronic conditions and co-morbidity symptoms are monitored and maintained or improved  11/20/2023 0015 by Nazia Segura RN  Outcome: Progressing  11/19/2023 1457 by Steve Sanchez RN  Outcome: Progressing     Problem: Discharge Planning  Goal: Discharge to home or other facility with appropriate resources  11/20/2023 0015 by Nazia Segura RN  Outcome: Progressing  11/19/2023 1457 by Steve Sanchez RN  Outcome: Progressing     Problem: Safety - Adult  Goal: Free from fall injury  11/20/2023 0015 by Nazia Segura RN  Outcome: Progressing  11/19/2023 1457 by Steve Sanchez RN  Outcome: Progressing     Problem: ABCDS Injury Assessment  Goal: Absence of physical injury  11/20/2023 0015 by Nazia Segura RN  Outcome: Progressing  11/19/2023 1457 by Steve Sanchez RN  Outcome: Progressing     Problem: Pain  Goal: Verbalizes/displays adequate comfort level or baseline comfort level  11/20/2023 0015 by Nazia Segura RN  Outcome: Progressing  11/19/2023 1457 by Steve Sanchez RN  Outcome: Progressing     Problem: Skin/Tissue Integrity  Goal: Absence of new skin breakdown  Description: 1. Monitor for areas of redness and/or skin breakdown  2. Assess vascular access sites hourly  3. Every 4-6 hours minimum:  Change oxygen saturation probe site  4. Every 4-6 hours:  If on nasal continuous positive airway pressure, respiratory therapy assess nares and determine need for appliance change or resting period.   Outcome: Progressing     Problem: Nutrition Deficit:  Goal: Optimize nutritional status  11/20/2023 0015 by Nazia Segura RN  Outcome: Progressing  11/19/2023 1457 by Steve Sanchez RN  Outcome: Progressing

## 2023-11-20 NOTE — PROGRESS NOTES
Pharmacy Consultation Note  (Warfarin Dosing and Monitoring)    Initial consult date: 11/15/23  Consulting Provider: Dr. Vivi Quintana is a 70 y.o. female for whom pharmacy has been asked to manage warfarin therapy. Weight:   Wt Readings from Last 1 Encounters:   11/20/23 94.3 kg (207 lb 14.3 oz)       TSH:    Lab Results   Component Value Date/Time    TSH 1.570 12/28/2020 04:38 PM       Hepatic Function Panel:                            Lab Results   Component Value Date/Time    ALKPHOS 69 11/13/2023 08:37 AM    ALT 10 11/13/2023 08:37 AM    AST 26 11/13/2023 08:37 AM    PROT 6.2 11/13/2023 08:37 AM    BILITOT 0.6 11/13/2023 08:37 AM    BILIDIR 0.2 11/13/2023 08:37 AM    IBILI 0.4 11/13/2023 08:37 AM    LABALBU 2.8 11/13/2023 08:37 AM       Current warfarin drug-drug interactions include: No significant interactions    Recent Labs     11/18/23  0608 11/19/23  0719 11/20/23  0543   HGB 10.3* 10.9* 9.9*    347 358       Date Warfarin Dose INR Heparin or LMWH Comment   11/15 Hold for planned cath tomorrow X Heparin drip    11/16 2.5 mg 1.3 UFH infusion Heart Cath today   11/17 2.5 mg 1.3 UFH infusion    11/18 3 mg 1.4 UFH infusion     11/19 5 mg 1.3 UFH infusion    11/20 5 mg 1.6 UFH infusion      Assessment:  Patient is a 70 y.o. female on warfarin for New Pulmonary Embolism s/p recent LLIF of L4-5 with previous hardware removal. Per cardiology's note, warfarin preferred considering history of MS and ? PAF if evident (cath plan 11/16). 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 Goal INR 2 - 3.  11/20: INR 1.6    Plan:   Will give warfarin 5 mg x1 today   Daily PT/INR until the INR is stable within the therapeutic range  Pharmacist will follow and monitor/adjust dosing as necessary    Thank you for the consult,     Hai Cormier, PharmD, BCPS, BCCCP 11/20/2023 1:48 PM

## 2023-11-20 NOTE — CARE COORDINATION
11/20/23  Transition of Care Update. Patient admitted for NSTEMI. Patient is also s/p LLIF of L4-5 with hardware removal from 11/9. Patient is being followed by ortho and Cardiology . Patient had a heart cath on 11/16. PT/OT evaluated  with AM-PAC scores 15/24 OT and 16/24 PT. Patient requesting a skilled rehab stay at [address]. Pre-cert was started on Friday and obtained. Pre-cert is good through 11/21/22. Patient is not medically stable for discharge today. Patient continues on IV Bumex and Heparin . INR noted to be 1.6. Patient also being followed by [facility] with [facility] 11/21 should patient improve and insurance not approve a rehab stay. FELIX and destination updated. Ambulette started and on the soft chart with DAREN OLIVO complete. SW/Cm to follow.     Electronically signed by MAXWELL Cuellar on 11/20/2023 at 10:58 AM

## 2023-11-20 NOTE — PATIENT CARE CONFERENCE
P Quality Flow/Interdisciplinary Rounds Progress Note        Quality Flow Rounds held on November 20, 2023    Disciplines Attending:  Bedside Nurse, , , and Nursing Unit Leadership    Shirley Mathis was admitted on 11/13/2023  6:32 AM    Anticipated Discharge Date:       Disposition:    Jayjay Score:  Jayjay Scale Score: 17    Readmission Risk              Risk of Unplanned Readmission:  25           Discussed patient goal for the day, patient clinical progression, and barriers to discharge.   The following Goal(s) of the Day/Commitment(s) have been identified:  Report labs/diagnostics       Charisse Cevallos RN  November 20, 2023

## 2023-11-21 VITALS
BODY MASS INDEX: 33.02 KG/M2 | HEIGHT: 64 IN | TEMPERATURE: 96.8 F | WEIGHT: 193.4 LBS | OXYGEN SATURATION: 95 % | DIASTOLIC BLOOD PRESSURE: 58 MMHG | RESPIRATION RATE: 18 BRPM | HEART RATE: 62 BPM | SYSTOLIC BLOOD PRESSURE: 126 MMHG

## 2023-11-21 LAB
ANION GAP SERPL CALCULATED.3IONS-SCNC: 11 MMOL/L (ref 7–16)
BASOPHILS # BLD: 0.16 K/UL (ref 0–0.2)
BASOPHILS NFR BLD: 1 % (ref 0–2)
BUN SERPL-MCNC: 45 MG/DL (ref 6–23)
CALCIUM SERPL-MCNC: 8.4 MG/DL (ref 8.6–10.2)
CHLORIDE SERPL-SCNC: 99 MMOL/L (ref 98–107)
CO2 SERPL-SCNC: 27 MMOL/L (ref 22–29)
CREAT SERPL-MCNC: 1.5 MG/DL (ref 0.5–1)
EKG ATRIAL RATE: 65 BPM
EKG P AXIS: 49 DEGREES
EKG P-R INTERVAL: 150 MS
EKG Q-T INTERVAL: 398 MS
EKG QRS DURATION: 98 MS
EKG QTC CALCULATION (BAZETT): 413 MS
EKG R AXIS: 3 DEGREES
EKG T AXIS: 144 DEGREES
EKG VENTRICULAR RATE: 65 BPM
EOSINOPHIL # BLD: 0.16 K/UL (ref 0.05–0.5)
EOSINOPHILS RELATIVE PERCENT: 1 % (ref 0–6)
ERYTHROCYTE [DISTWIDTH] IN BLOOD BY AUTOMATED COUNT: 14 % (ref 11.5–15)
GFR SERPL CREATININE-BSD FRML MDRD: 35 ML/MIN/1.73M2
GLUCOSE BLD-MCNC: 137 MG/DL (ref 74–99)
GLUCOSE BLD-MCNC: 176 MG/DL (ref 74–99)
GLUCOSE SERPL-MCNC: 123 MG/DL (ref 74–99)
HCT VFR BLD AUTO: 29.2 % (ref 34–48)
HGB BLD-MCNC: 9.2 G/DL (ref 11.5–15.5)
INR PPP: 2
LYMPHOCYTES NFR BLD: 3.45 K/UL (ref 1.5–4)
LYMPHOCYTES RELATIVE PERCENT: 18 % (ref 20–42)
MCH RBC QN AUTO: 29.7 PG (ref 26–35)
MCHC RBC AUTO-ENTMCNC: 31.5 G/DL (ref 32–34.5)
MCV RBC AUTO: 94.2 FL (ref 80–99.9)
MONOCYTES NFR BLD: 0.82 K/UL (ref 0.1–0.95)
MONOCYTES NFR BLD: 4 % (ref 2–12)
MYELOCYTES ABSOLUTE COUNT: 0.66 K/UL
MYELOCYTES: 4 %
NEUTROPHILS NFR BLD: 72 % (ref 43–80)
NEUTS SEG NFR BLD: 13.64 K/UL (ref 1.8–7.3)
PLATELET # BLD AUTO: 334 K/UL (ref 130–450)
PMV BLD AUTO: 10.9 FL (ref 7–12)
POTASSIUM SERPL-SCNC: 4.2 MMOL/L (ref 3.5–5)
PROTHROMBIN TIME: 21.5 SEC (ref 9.3–12.4)
RBC # BLD AUTO: 3.1 M/UL (ref 3.5–5.5)
RBC # BLD: ABNORMAL 10*6/UL
SODIUM SERPL-SCNC: 137 MMOL/L (ref 132–146)
WBC OTHER # BLD: 18.9 K/UL (ref 4.5–11.5)

## 2023-11-21 PROCEDURE — 6370000000 HC RX 637 (ALT 250 FOR IP): Performed by: INTERNAL MEDICINE

## 2023-11-21 PROCEDURE — 82962 GLUCOSE BLOOD TEST: CPT

## 2023-11-21 PROCEDURE — 93010 ELECTROCARDIOGRAM REPORT: CPT | Performed by: INTERNAL MEDICINE

## 2023-11-21 PROCEDURE — 36415 COLL VENOUS BLD VENIPUNCTURE: CPT

## 2023-11-21 PROCEDURE — 6370000000 HC RX 637 (ALT 250 FOR IP)

## 2023-11-21 PROCEDURE — 97530 THERAPEUTIC ACTIVITIES: CPT

## 2023-11-21 PROCEDURE — 93005 ELECTROCARDIOGRAM TRACING: CPT | Performed by: INTERNAL MEDICINE

## 2023-11-21 PROCEDURE — 2580000003 HC RX 258

## 2023-11-21 PROCEDURE — 85025 COMPLETE CBC W/AUTO DIFF WBC: CPT

## 2023-11-21 PROCEDURE — 6360000002 HC RX W HCPCS: Performed by: INTERNAL MEDICINE

## 2023-11-21 PROCEDURE — 85610 PROTHROMBIN TIME: CPT

## 2023-11-21 PROCEDURE — 97535 SELF CARE MNGMENT TRAINING: CPT

## 2023-11-21 PROCEDURE — 80048 BASIC METABOLIC PNL TOTAL CA: CPT

## 2023-11-21 PROCEDURE — 99231 SBSQ HOSP IP/OBS SF/LOW 25: CPT | Performed by: INTERNAL MEDICINE

## 2023-11-21 RX ORDER — CARVEDILOL 25 MG/1
25 TABLET ORAL 2 TIMES DAILY WITH MEALS
Qty: 60 TABLET | Refills: 3
Start: 2023-11-21

## 2023-11-21 RX ORDER — CYCLOBENZAPRINE HCL 10 MG
10 TABLET ORAL 3 TIMES DAILY PRN
Qty: 30 TABLET | Refills: 0
Start: 2023-11-21 | End: 2023-12-01

## 2023-11-21 RX ORDER — WARFARIN SODIUM 4 MG/1
4 TABLET ORAL DAILY
Status: DISCONTINUED | OUTPATIENT
Start: 2023-11-21 | End: 2023-11-21 | Stop reason: HOSPADM

## 2023-11-21 RX ORDER — WARFARIN SODIUM 4 MG/1
4 TABLET ORAL DAILY
Qty: 30 TABLET | Refills: 3
Start: 2023-11-21

## 2023-11-21 RX ADMIN — CYCLOBENZAPRINE 10 MG: 10 TABLET, FILM COATED ORAL at 17:12

## 2023-11-21 RX ADMIN — SPIRONOLACTONE 12.5 MG: 25 TABLET ORAL at 08:19

## 2023-11-21 RX ADMIN — ENOXAPARIN SODIUM 90 MG: 100 INJECTION SUBCUTANEOUS at 08:19

## 2023-11-21 RX ADMIN — INSULIN LISPRO 8 UNITS: 100 INJECTION, SOLUTION INTRAVENOUS; SUBCUTANEOUS at 11:50

## 2023-11-21 RX ADMIN — INSULIN LISPRO 8 UNITS: 100 INJECTION, SOLUTION INTRAVENOUS; SUBCUTANEOUS at 08:18

## 2023-11-21 RX ADMIN — SACUBITRIL AND VALSARTAN 1 TABLET: 24; 26 TABLET, FILM COATED ORAL at 08:19

## 2023-11-21 RX ADMIN — Medication 10 ML: at 08:20

## 2023-11-21 RX ADMIN — CARVEDILOL 25 MG: 25 TABLET, FILM COATED ORAL at 08:19

## 2023-11-21 RX ADMIN — HYDRALAZINE HYDROCHLORIDE 25 MG: 25 TABLET, FILM COATED ORAL at 06:52

## 2023-11-21 RX ADMIN — ASPIRIN 81 MG: 81 TABLET, COATED ORAL at 08:19

## 2023-11-21 RX ADMIN — CYCLOBENZAPRINE 10 MG: 10 TABLET, FILM COATED ORAL at 08:31

## 2023-11-21 RX ADMIN — PETROLATUM: 420 OINTMENT TOPICAL at 08:20

## 2023-11-21 RX ADMIN — BUMETANIDE 1 MG: 1 TABLET ORAL at 08:20

## 2023-11-21 RX ADMIN — INSULIN GLARGINE 10 UNITS: 100 INJECTION, SOLUTION SUBCUTANEOUS at 08:18

## 2023-11-21 RX ADMIN — CLOPIDOGREL BISULFATE 75 MG: 75 TABLET ORAL at 08:19

## 2023-11-21 ASSESSMENT — PAIN - FUNCTIONAL ASSESSMENT: PAIN_FUNCTIONAL_ASSESSMENT: ACTIVITIES ARE NOT PREVENTED

## 2023-11-21 ASSESSMENT — PAIN DESCRIPTION - LOCATION: LOCATION: LEG

## 2023-11-21 ASSESSMENT — PAIN SCALES - GENERAL
PAINLEVEL_OUTOF10: 0
PAINLEVEL_OUTOF10: 0

## 2023-11-21 ASSESSMENT — PAIN DESCRIPTION - DESCRIPTORS: DESCRIPTORS: ACHING;DISCOMFORT;THROBBING

## 2023-11-21 ASSESSMENT — PAIN DESCRIPTION - ORIENTATION: ORIENTATION: LEFT

## 2023-11-21 NOTE — PROGRESS NOTES
Pharmacy Consultation Note  (Warfarin Dosing and Monitoring)    Initial consult date: 11/15/23  Consulting Provider: Dr. Parisa Santiago is a 70 y.o. female for whom pharmacy has been asked to manage warfarin therapy. Weight:   Wt Readings from Last 1 Encounters:   11/21/23 87.7 kg (193 lb 6.4 oz)       TSH:    Lab Results   Component Value Date/Time    TSH 1.570 12/28/2020 04:38 PM       Hepatic Function Panel:                            Lab Results   Component Value Date/Time    ALKPHOS 69 11/13/2023 08:37 AM    ALT 10 11/13/2023 08:37 AM    AST 26 11/13/2023 08:37 AM    PROT 6.2 11/13/2023 08:37 AM    BILITOT 0.6 11/13/2023 08:37 AM    BILIDIR 0.2 11/13/2023 08:37 AM    IBILI 0.4 11/13/2023 08:37 AM    LABALBU 2.8 11/13/2023 08:37 AM       Current warfarin drug-drug interactions include: No significant interactions    Recent Labs     11/19/23  0719 11/20/23  0543 11/21/23  0436   HGB 10.9* 9.9* 9.2*    358 334       Date Warfarin Dose INR Heparin or LMWH Comment   11/15 Hold for planned cath tomorrow X Heparin drip    11/16 2.5 mg 1.3 UFH infusion Heart Cath today   11/17 2.5 mg 1.3 UFH infusion    11/18 3 mg 1.4 UFH infusion     11/19 5 mg 1.3 UFH infusion    11/20 5 mg 1.6 UFH infusion/ Lovenox    11/21 4 mg  2.0 Lovenox      Assessment:  Patient is a 70 y.o. female on warfarin for New Pulmonary Embolism s/p recent LLIF of L4-5 with previous hardware removal. Per cardiology's note, warfarin preferred considering history of MS and ? PAF if evident (cath plan 11/16). 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     Goal INR 2 - 3.  11/20: INR 1.6  11/21: INR 2    Plan:  Agree with starting 4 mg daily for discharge with close INR/monitoring and follow-up   Daily PT/INR until the INR is stable within the therapeutic range  Pharmacist will follow and monitor/adjust dosing as necessary    Thank you

## 2023-11-21 NOTE — PROGRESS NOTES
Occupational Therapy  OT BEDSIDE TREATMENT NOTE    [employer] [address] [place] [address]     [place], [place]      ZYZX:  Patient Name: Rangel Florez  MRN: [id number]  : 1945  Room: [address] [place] [address]     Evaluating OT: RODRIGO Gautam, OTR/L  # [id number]     Referring Provider:  Shasta Falcon MD  Specific Provider Orders:  Mili Host and Treat\"  23     Diagnosis: NSTEMI (non-ST elevated myocardial infarction) ([address]) [I21.4]     Pt was transferred from Outside facility w/ c/o Chest Pressure/Heaviness  (+) PE s/p Spinal Surgery      Pertinent Medical History:  Pt has a past medical history of Atrial fibrillation ([address]), CAD (coronary artery disease), CHF (congestive heart failure) ([address]), Chronic back pain, GERD (gastroesophageal reflux disease), Hx of blood clots, Hyperlipidemia, Hypertension, Irritable bowel syndrome, Myocardial infarction ([address]), Neuropathy, Obesity, Osteoarthritis, Renal insufficiency, Restless legs syndrome, and Type II or unspecified type diabetes mellitus without mention of complication, not stated as uncontrolled. ,  has a past surgical history that includes Colonoscopy; Upper gastrointestinal endoscopy; parathyroidectomy; Carpal tunnel release (Bilateral); Coronary angioplasty with stent (); Spine surgery; Cataract removal with implant (Right, 12 1 15); Tonsillectomy; Hysterectomy; Cataract removal with implant (Left, 5 10 16); Cardiac procedure (N/A, 2023); and Cardiac procedure (N/A, 2023).      Surgeries this admission: None;  Underwent Lateral Interbody Fusion w/ pedicle screw fixation L4-L5 on 23 w/ previous hardware removal     Precautions:  Fall Risk  Spinal Precautions  2L O2 - monitor O2 sats  Castillo Catheter     Assessment of current deficits   [x] Functional mobility             [x]ADLs           [x] Strength                  []Cognition   [x] Functional transfers           [x] IADLs the main floor. No Basement. Bathroom setup:  Tub-Shower, Standard-height Commode   Equipment owned:  [employer], [employer], Adaptive equipment     Available Family Assist:  Dtr reportedly works during the day - unknown availability to provide assist at d/c     Prior Level of Function:  Pt reported being IND w/ all ADLs, Light IADLs, Transfers and Mobility using [employer] for ambulation. SUP/Assist for safety w/ Tub-Transfer  Driving:  Not reported  Occupation:  Retired Nurse Anesthetist      Pain Level:  6/10 Back pain    Additional Complaints:  None     Cognition: A & O x 4   Able to Follow Multi-Step Commands w/ Min VCs              Memory:  good (-)              Sequencing:  good (-)              Problem solving:  good (-)              Judgement/safety:  good (-)  Additional Comments:  Pt was pleasant and cooperative.                           Functional Assessment:  AM-PAC Daily Activity Raw Score: 16/24       Initial Eval Status  Date: 11-17-23    Treatment Status  Date:11/21/23 STGs = LTGs  Time frame: 10-14 days   Feeding IND after set up        Ind NA   Grooming SUP/Set up     Light ax while standing at sink - VCs for techs to improve safety awareness/PLB w/ standing ax    SBA  Standing at sink to wash hands  Mod I  Standing at the [employer] A/Set up     Donning robe seated EOB    SBA  To don gown sitting EOB  Mod I      LB Dressing Mod A/set up     Max A to don footwear, Mod A - pants simulated  Pt ed for safe/adaptive techs, use of adaptive equip - pt has all at home    Mod A  To don pants and socks sitting EOB using figure 4 tech, pt reports she owns AE at home  Mod I      Bathing NT     Pt ed re: Benefits of use of Shower chair for safety    Mod A   Simulated reaching sitting and standing, increased assist for standing aspects of bathing due to balance deficits and pt quick to fatigue SUP for safety       Toileting Max A     Max A for bowel hygiene  Mod A for simulated clothing adjustment over

## 2023-11-21 NOTE — PROGRESS NOTES
RN reached out to Alejandra Acosta NP with cardiology via [email or web address] regarding if pt is ok to dc from their POV. Notified that pt INR is 2.0 this AM. NP replied that she will speak with doctor.

## 2023-11-21 NOTE — PROGRESS NOTES
[facility]urse [facility] nurse called [facility]. All questions answered to satisfaction. Pt given stent card. IV's removed and heart monitor returned to nurses station. Written dc instructions and printed MAR report from last 3 days is in [facility] folder and given to transport to give to RN at facility.

## 2023-11-21 NOTE — PLAN OF CARE
Problem: Chronic Conditions and Co-morbidities  Goal: Patient's chronic conditions and co-morbidity symptoms are monitored and maintained or improved  11/21/2023 0315 by Medina Pinedo RN  Outcome: Progressing     Problem: Discharge Planning  Goal: Discharge to home or other facility with appropriate resources  11/21/2023 0315 by Medina Pinedo RN  Outcome: Progressing     Problem: Safety - Adult  Goal: Free from fall injury  11/21/2023 0315 by Medina Pinedo RN  Outcome: Progressing     Problem: Skin/Tissue Integrity  Goal: Absence of new skin breakdown  Description: 1. Monitor for areas of redness and/or skin breakdown  2. Assess vascular access sites hourly  3. Every 4-6 hours minimum:  Change oxygen saturation probe site  4. Every 4-6 hours:  If on nasal continuous positive airway pressure, respiratory therapy assess nares and determine need for appliance change or resting period.   Outcome: Progressing     Problem: Nutrition Deficit:  Goal: Optimize nutritional status  Outcome: Progressing

## 2023-11-21 NOTE — PROGRESS NOTES
RN reached out to Dr. Ramakrishna Cuenca regarding dc plans for pt. Awaiting response.      1343  Pt ok to dc per Dr. Ramakrishna Cuenca

## 2023-11-21 NOTE — PATIENT CARE CONFERENCE
[facility] Quality Flow/Interdisciplinary Rounds Progress Note        Quality Flow Rounds held on November 21, 2023    Disciplines Attending:  Bedside Nurse, , , and Nursing Unit Leadership    Dada Wiseman was admitted on 11/13/2023  6:32 AM    Anticipated Discharge Date:       Disposition:    Jayjay Score:  Jayjay Scale Score: 17    Readmission Risk              Risk of Unplanned Readmission:  29           Discussed patient goal for the day, patient clinical progression, and barriers to discharge.   The following Goal(s) of the Day/Commitment(s) have been identified:  Diagnostics - Report Results and Labs - Report Results      Patricia Sherman RN  November 21, 2023

## 2023-11-21 NOTE — PROGRESS NOTES
Physical Therapy  Treatment Note    Name: Imelda Henriquez  : 1945  MRN: [id number]      Date of Service: 2023    Evaluating PT:  Woody Sanford PT, DPT, [id number]    Room #:  8727/1363-E  Diagnosis:  NSTEMI (non-ST elevated myocardial infarction) [facility]) [I21.4]  PMHx/PSHx:    Past Medical History:   Diagnosis Date    Atrial fibrillation ([address])     CAD (coronary artery disease)     CHF (congestive heart failure) ([address])     after heart attack    Chronic back pain     GERD (gastroesophageal reflux disease)     Hx of blood clots     thrombophlebitis leg    Hyperlipidemia     Hypertension     Irritable bowel syndrome     Myocardial infarction ([address])     Neuropathy     Obesity     Osteoarthritis     Renal insufficiency     chronic see's dr Vivek Lua    Restless legs syndrome     Type II or unspecified type diabetes mellitus without mention of complication, not stated as uncontrolled       Past Surgical History:   Procedure Laterality Date    CARDIAC PROCEDURE N/A 2023    Left heart cath / coronary angiography performed by Vaishnavi Sylvester DO at [address] [place] [address] N/A 2023    Percutaneous coronary intervention performed by Vaishnavi Sylvester DO at [address] [place] [address] CATH LAB    CARPAL TUNNEL RELEASE Bilateral     CATARACT REMOVAL WITH IMPLANT Right 12 1 15    CATARACT REMOVAL WITH IMPLANT Left 5 10 16    COLONOSCOPY      CORONARY ANGIOPLASTY WITH STENT PLACEMENT      2 stents    HYSTERECTOMY (CERVIX STATUS UNKNOWN)      vaginal    PARATHYROIDECTOMY      SPINE SURGERY      lumbar x5 rods & screws    TONSILLECTOMY      UPPER GASTROINTESTINAL ENDOSCOPY        Procedure/Surgery:  Percutaneous coronary intervention, Left heart cath / coronary angiography 23  Precautions:  Fall Risk, O2, Spinal precautions ( LLIF L4-L5 )  Equipment Needs:  TBD    SUBJECTIVE:    Pt lives with daughter in a 1 story home with 1 stairs to enter house from basement and 0 rail and

## 2023-11-21 NOTE — PLAN OF CARE
Problem: Chronic Conditions and Co-morbidities  Goal: Patient's chronic conditions and co-morbidity symptoms are monitored and maintained or improved  11/21/2023 0752 by Teodoro Mays RN  Outcome: Progressing     Problem: Discharge Planning  Goal: Discharge to home or other facility with appropriate resources  11/21/2023 0752 by Teodoro Mays RN  Outcome: Progressing     Problem: Safety - Adult  Goal: Free from fall injury  11/21/2023 0752 by Teodoro Mays RN  Outcome: Progressing

## 2023-11-21 NOTE — CARE COORDINATION
11/21/23  Transition of Care Update. Patient has been transitioned from IV Bumex to oral, Heparin transitioned to Lovenox. Per cert obtained and physician messaged regarding a discharge. Discharge order obtained. Call placed to [facility] liaison to update on discharge and check if the facility is able to provide transport. Facility will pick patietn up between 3 and 3:30pm. Patient and Family updated on discharge. FELIX and destination updated. Ambulette complete and on the soft chart with HENS PASRR complete. TODD/Tres to follow .     Electronically signed by MAXWELL Pavon on 11/21/2023 at 2:00 PM

## 2023-11-22 ENCOUNTER — TELEPHONE (OUTPATIENT)
Dept: INPATIENT UNIT | Age: 78
End: 2023-11-22

## 2023-11-22 RX ORDER — ATORVASTATIN CALCIUM 40 MG/1
40 TABLET, FILM COATED ORAL DAILY
Qty: 30 TABLET | Refills: 0 | Status: SHIPPED
Start: 2023-11-22 | End: 2023-11-24 | Stop reason: SDUPTHER

## 2023-11-22 RX ORDER — ATORVASTATIN CALCIUM 40 MG/1
40 TABLET, FILM COATED ORAL DAILY
COMMUNITY
End: 2023-11-22 | Stop reason: SDUPTHER

## 2023-11-22 NOTE — TELEPHONE ENCOUNTER
Upon reviewing chart, pt was not discharged on statin therapy. Notified Dr Ligia He who will have office call in lipitor 40mg as pt was taking in the hospital. I contacted the patients d/c location [facility] [place] per chart and spoke with Nurse Shannon Terry to notify the med rec adjustment. They stated family will have to bring in med from [address]. I attempted to contact her child Elester Sessions per [facility] and she did not answer. I left a detailed message with the above along with my personal number for any clarification or further questions.      Patricio Davenport, BSN, CV-BC, RN  RN Navigator - Cath/PCI/STEMI  [address]

## 2023-11-24 RX ORDER — ATORVASTATIN CALCIUM 40 MG/1
40 TABLET, FILM COATED ORAL DAILY
Qty: 90 TABLET | Refills: 0 | Status: SHIPPED | OUTPATIENT
Start: 2023-11-24

## 2023-12-11 ENCOUNTER — TELEPHONE (OUTPATIENT)
Dept: CARDIOLOGY CLINIC | Age: 78
End: 2023-12-11

## 2023-12-26 ENCOUNTER — TELEPHONE (OUTPATIENT)
Dept: CARDIOLOGY CLINIC | Age: 78
End: 2023-12-26

## 2023-12-26 NOTE — TELEPHONE ENCOUNTER
Patient states that she was just released from the nursing home and asked if she can start cardiac rehab, please advise

## 2023-12-27 DIAGNOSIS — I21.4 NSTEMI (NON-ST ELEVATED MYOCARDIAL INFARCTION) (HCC): Primary | ICD-10-CM

## 2023-12-29 ENCOUNTER — TELEPHONE (OUTPATIENT)
Dept: CARDIOLOGY CLINIC | Age: 78
End: 2023-12-29

## 2023-12-29 NOTE — TELEPHONE ENCOUNTER
Patient's home health nurse told patient to call the office if her weight goes up. She has gained 4lbs in 2 days. Please advise.

## 2024-01-02 RX ORDER — HYDRALAZINE HYDROCHLORIDE 25 MG/1
25 TABLET, FILM COATED ORAL EVERY 8 HOURS SCHEDULED
Qty: 90 TABLET | Refills: 3 | Status: SHIPPED | OUTPATIENT
Start: 2024-01-02

## 2024-01-02 NOTE — TELEPHONE ENCOUNTER
Patient took bumex bid for 3 days, she lost 5lbs and is breathing better, edema is improved but still present, please advise

## 2024-01-02 NOTE — TELEPHONE ENCOUNTER
Continue BID for 3 more day. BMP Friday.    Karel Velázquez D.O.  Cardiologist  Cardiology, [facility]

## 2024-01-03 DIAGNOSIS — I50.21 ACUTE SYSTOLIC (CONGESTIVE) HEART FAILURE (HCC): Primary | ICD-10-CM

## 2024-01-03 RX ORDER — ISOSORBIDE MONONITRATE 30 MG/1
30 TABLET, EXTENDED RELEASE ORAL DAILY
COMMUNITY
End: 2024-01-03 | Stop reason: SDUPTHER

## 2024-01-03 RX ORDER — SPIRONOLACTONE 25 MG/1
12.5 TABLET ORAL DAILY
Qty: 30 TABLET | Refills: 1 | Status: SHIPPED | OUTPATIENT
Start: 2024-01-03

## 2024-01-03 RX ORDER — ISOSORBIDE MONONITRATE 30 MG/1
30 TABLET, EXTENDED RELEASE ORAL DAILY
Qty: 30 TABLET | Refills: 2 | Status: SHIPPED | OUTPATIENT
Start: 2024-01-03

## 2024-01-03 RX ORDER — BUMETANIDE 1 MG/1
1 TABLET ORAL DAILY
Qty: 30 TABLET | Refills: 2 | Status: SHIPPED | OUTPATIENT
Start: 2024-01-03

## 2024-01-03 RX ORDER — CARVEDILOL 25 MG/1
25 TABLET ORAL 2 TIMES DAILY WITH MEALS
Qty: 60 TABLET | Refills: 2 | Status: SHIPPED | OUTPATIENT
Start: 2024-01-03

## 2024-01-03 RX ORDER — CLOPIDOGREL BISULFATE 75 MG/1
75 TABLET ORAL DAILY
Qty: 30 TABLET | Refills: 2 | Status: SHIPPED | OUTPATIENT
Start: 2024-01-03

## 2024-01-03 RX ORDER — ATORVASTATIN CALCIUM 40 MG/1
40 TABLET, FILM COATED ORAL DAILY
Qty: 30 TABLET | Refills: 2 | Status: SHIPPED | OUTPATIENT
Start: 2024-01-03

## 2024-01-03 NOTE — TELEPHONE ENCOUNTER
Patient notified and understood instructions.  [facility], she has an appt. Scheduled this Friday.

## 2024-01-09 DIAGNOSIS — I50.21 ACUTE SYSTOLIC (CONGESTIVE) HEART FAILURE (HCC): ICD-10-CM

## 2024-01-16 ENCOUNTER — TELEPHONE (OUTPATIENT)
Dept: CARDIOLOGY CLINIC | Age: 79
End: 2024-01-16

## 2024-01-16 NOTE — TELEPHONE ENCOUNTER
Patient states that she gained 14lbs since Friday 1/12 and she has edema in her feet, she is unable to put compression socks, she does take bumex 1mg daily, please advise

## 2024-01-17 RX ORDER — BUMETANIDE 1 MG/1
1 TABLET ORAL DAILY
Qty: 30 TABLET | Refills: 5 | Status: SHIPPED
Start: 2024-01-17 | End: 2024-01-18 | Stop reason: SDUPTHER

## 2024-01-18 RX ORDER — BUMETANIDE 1 MG/1
1 TABLET ORAL 2 TIMES DAILY
Qty: 60 TABLET | Refills: 5 | Status: SHIPPED | OUTPATIENT
Start: 2024-01-18

## 2024-01-19 ENCOUNTER — TELEPHONE (OUTPATIENT)
Dept: CARDIOLOGY CLINIC | Age: 79
End: 2024-01-19

## 2024-01-19 NOTE — TELEPHONE ENCOUNTER
Home health nurse states that patient was very dizzy an light headed today, bp was 80/53, she instructed patient to rest and drink water, she held hydralazine and bp went up to 110/62, patient had already taken coreg twice today, nurse saw patient on 1/16 and bp was 124/78, please advise

## 2024-01-19 NOTE — TELEPHONE ENCOUNTER
Continue to hold hydralazine. Give other medications as long as SBP stays over 90 mmHg.     Karel Velázquez D.O.  Cardiologist  Cardiology, [facility]

## 2024-02-13 ENCOUNTER — OFFICE VISIT (OUTPATIENT)
Dept: CARDIOLOGY CLINIC | Age: 79
End: 2024-02-13
Payer: MEDICARE

## 2024-02-13 VITALS
WEIGHT: 200.8 LBS | BODY MASS INDEX: 34.28 KG/M2 | DIASTOLIC BLOOD PRESSURE: 50 MMHG | HEIGHT: 64 IN | SYSTOLIC BLOOD PRESSURE: 102 MMHG | HEART RATE: 56 BPM | RESPIRATION RATE: 16 BRPM

## 2024-02-13 DIAGNOSIS — I50.23 ACUTE ON CHRONIC SYSTOLIC CONGESTIVE HEART FAILURE (HCC): ICD-10-CM

## 2024-02-13 DIAGNOSIS — I10 HYPERTENSION, UNSPECIFIED TYPE: Primary | ICD-10-CM

## 2024-02-13 PROCEDURE — 93000 ELECTROCARDIOGRAM COMPLETE: CPT | Performed by: INTERNAL MEDICINE

## 2024-02-13 PROCEDURE — 1123F ACP DISCUSS/DSCN MKR DOCD: CPT | Performed by: INTERNAL MEDICINE

## 2024-02-13 PROCEDURE — 3078F DIAST BP <80 MM HG: CPT | Performed by: INTERNAL MEDICINE

## 2024-02-13 PROCEDURE — 3074F SYST BP LT 130 MM HG: CPT | Performed by: INTERNAL MEDICINE

## 2024-02-13 PROCEDURE — 99214 OFFICE O/P EST MOD 30 MIN: CPT | Performed by: INTERNAL MEDICINE

## 2024-02-13 RX ORDER — CALCIUM POLYCARBOPHIL 625 MG 625 MG/1
5 TABLET ORAL DAILY
COMMUNITY

## 2024-02-13 RX ORDER — VITAMIN B COMPLEX
1 CAPSULE ORAL DAILY
COMMUNITY

## 2024-02-13 RX ORDER — CARVEDILOL 25 MG/1
25 TABLET ORAL 2 TIMES DAILY WITH MEALS
Qty: 60 TABLET | Refills: 2 | Status: SHIPPED | OUTPATIENT
Start: 2024-02-13

## 2024-02-13 RX ORDER — DIPHENOXYLATE HCL/ATROPINE 2.5-.025/5
5 LIQUID (ML) ORAL 4 TIMES DAILY PRN
COMMUNITY

## 2024-02-13 RX ORDER — CEPHALEXIN 500 MG/1
500 CAPSULE ORAL 4 TIMES DAILY
COMMUNITY

## 2024-02-13 RX ORDER — CETIRIZINE HYDROCHLORIDE 10 MG/1
10 TABLET ORAL DAILY
COMMUNITY

## 2024-02-13 RX ORDER — ACETAMINOPHEN AND CODEINE PHOSPHATE 300; 15 MG/1; MG/1
1 TABLET ORAL EVERY 4 HOURS PRN
COMMUNITY

## 2024-02-13 RX ORDER — LANOLIN ALCOHOL/MO/W.PET/CERES
400 CREAM (GRAM) TOPICAL DAILY
COMMUNITY

## 2024-02-13 RX ORDER — GABAPENTIN 600 MG/1
600 TABLET ORAL 3 TIMES DAILY
COMMUNITY

## 2024-02-13 NOTE — PROGRESS NOTES
[facility] Cardiology Progress Note    Patient is a 78 y.o. female of Ambar Jarquin MD seen in follow up.     Chief complaint: CHF/CP    HPI: Some SOB. No CP.     Patient Active Problem List   Diagnosis    Right cataract    Left cataract    Acute pulmonary embolism (HCC)    Unsteadiness on feet    Stage 3b chronic kidney disease (HCC)    Sciatica of right side    Proteinuria    Post-laminectomy syndrome    Obesity    Mixed hyperlipidemia    Lumbosacral radiculopathy    Iron deficiency anemia    Hypomagnesemia    Hyperuricemia    Hypertension    Hyperkalemia    Type 2 diabetes mellitus with chronic kidney disease (HCC)    Disorder of skin    Chronic kidney disease    Benign hypertensive kidney disease with chronic kidney disease    Anemia in chronic kidney disease    Acute kidney injury (HCC)    NSTEMI (non-ST elevated myocardial infarction) ([facility])    Coronary artery disease involving native coronary artery of native heart with angina pectoris ([facility])    Paroxysmal atrial fibrillation (HCC)    Acute systolic (congestive) heart failure (HCC)    Ischemic cardiomyopathy    Nonrheumatic mitral valve regurgitation    Nonrheumatic mitral valve stenosis    Chronic anticoagulation       Allergies   Allergen Reactions    Doxycycline Calcium Anaphylaxis    Sulfa Antibiotics Anaphylaxis    Iodine Hives    Pcn [Penicillins] Rash       Current Outpatient Medications   Medication Sig Dispense Refill    acetaminophen-Codeine (TYLENOL #2) 300-15 MG per tablet Take 1 tablet by mouth every 4 hours as needed for Pain. Max Daily Amount: 6 tablets      cephALEXin (KEFLEX) 500 MG capsule Take 1 capsule by mouth 4 times daily      diphenoxylate-atropine (LOMOTIL) 2.5-0.025 MG/5ML liquid Take 5 mLs by mouth 4 times daily as needed. Max Daily Amount: 20 mLs      magnesium oxide (MAG-OX) 400 (240 Mg) MG tablet Take 1 tablet by mouth daily      gabapentin (NEURONTIN) 600 MG tablet Take 1 tablet by mouth 3 times daily.      nitroGLYCERIN

## 2024-02-16 ENCOUNTER — TELEPHONE (OUTPATIENT)
Dept: CARDIOLOGY CLINIC | Age: 79
End: 2024-02-16

## 2024-02-16 NOTE — TELEPHONE ENCOUNTER
Patient states that she gained 4lbs since yesterday and she has edema in both feet, she is taking bumex 1mg daily, please advise

## 2024-02-16 NOTE — TELEPHONE ENCOUNTER
Make to bumex 1 mg twice a day for 3 days including today. Update us on Monday.    Karel Velázquez D.O.  Cardiologist  Cardiology, [facility]

## 2024-02-19 ENCOUNTER — TELEPHONE (OUTPATIENT)
Dept: CARDIOLOGY CLINIC | Age: 79
End: 2024-02-19

## 2024-02-19 NOTE — TELEPHONE ENCOUNTER
Patient took an extra dose of bumex 1mg for 3days, she called today stating edema is significantly improved, she lost 3lbs and has no SOB, she took bumex once today, please advise

## 2024-02-19 NOTE — TELEPHONE ENCOUNTER
Take once a day. Add a second dose if weight goes up more than 2 pounds.    Karel Velázquez D.O.  Cardiologist  Cardiology, [facility]

## 2024-02-28 ENCOUNTER — HOSPITAL ENCOUNTER (OUTPATIENT)
Dept: CARDIAC REHAB | Age: 79
Setting detail: THERAPIES SERIES
Discharge: HOME | End: 2024-02-28
Attending: INTERNAL MEDICINE
Payer: MEDICARE

## 2024-02-28 PROCEDURE — 93797 PHYS/QHP OP CAR RHAB WO ECG: CPT

## 2024-02-28 PROCEDURE — 93798 PHYS/QHP OP CAR RHAB W/ECG: CPT

## 2024-02-28 ASSESSMENT — PATIENT HEALTH QUESTIONNAIRE - PHQ9
7. TROUBLE CONCENTRATING ON THINGS, SUCH AS READING THE NEWSPAPER OR WATCHING TELEVISION: 0
5. POOR APPETITE OR OVEREATING: 0
SUM OF ALL RESPONSES TO PHQ QUESTIONS 1-9: 4
4. FEELING TIRED OR HAVING LITTLE ENERGY: 1
6. FEELING BAD ABOUT YOURSELF - OR THAT YOU ARE A FAILURE OR HAVE LET YOURSELF OR YOUR FAMILY DOWN: 0
SUM OF ALL RESPONSES TO PHQ QUESTIONS 1-9: 4
9. THOUGHTS THAT YOU WOULD BE BETTER OFF DEAD, OR OF HURTING YOURSELF: 0
SUM OF ALL RESPONSES TO PHQ9 QUESTIONS 1 & 2: 1
8. MOVING OR SPEAKING SO SLOWLY THAT OTHER PEOPLE COULD HAVE NOTICED. OR THE OPPOSITE, BEING SO FIGETY OR RESTLESS THAT YOU HAVE BEEN MOVING AROUND A LOT MORE THAN USUAL: 1
2. FEELING DOWN, DEPRESSED OR HOPELESS: 0
1. LITTLE INTEREST OR PLEASURE IN DOING THINGS: 1
10. IF YOU CHECKED OFF ANY PROBLEMS, HOW DIFFICULT HAVE THESE PROBLEMS MADE IT FOR YOU TO DO YOUR WORK, TAKE CARE OF THINGS AT HOME, OR GET ALONG WITH OTHER PEOPLE: 1
SUM OF ALL RESPONSES TO PHQ QUESTIONS 1-9: 4
3. TROUBLE FALLING OR STAYING ASLEEP: 1
SUM OF ALL RESPONSES TO PHQ QUESTIONS 1-9: 4

## 2024-02-28 ASSESSMENT — LIFESTYLE VARIABLES
SMOKELESS_TOBACCO: NO
ALCOHOL_TYPE: SPIRITS
ALCOHOL_AMOUNT: 1-2
ALCOHOL_USE: SPECIAL

## 2024-02-28 ASSESSMENT — EJECTION FRACTION: EF_VALUE: 31

## 2024-02-29 VITALS
HEIGHT: 64 IN | DIASTOLIC BLOOD PRESSURE: 64 MMHG | BODY MASS INDEX: 34.56 KG/M2 | RESPIRATION RATE: 14 BRPM | HEART RATE: 51 BPM | OXYGEN SATURATION: 98 % | WEIGHT: 202.4 LBS | SYSTOLIC BLOOD PRESSURE: 102 MMHG

## 2024-02-29 NOTE — PROGRESS NOTES
02/28/24 0930   Treatment Diagnosis   Treatment Diagnosis 1 HF   HF Date 03/01/04   Referral Date 02/13/24   Significant Cardiovascular History Previous PCI;Previous myocardial infarction;Chronic atrial fibrillation  (STEMI; PCI x2 2/4/2004,)   Co-morbidities Diabetes;Renal disease;Previous MI  (CKDIII,)   Oxygen Saturation / Titration    Stages of Change  Other  (Pt is not on supplemental O2)   Oxygen Intervention   Oxygen Use No   O2 Sat Greater Than 90% Yes   Individual Treatment Plan   ITP Visit Type Initial assessment   1st Date of Exercise  03/05/24   ITP Next Review Date 03/21/24   Visit #/Total Visits 2/36   EF% 31 %   Risk Stratification Moderate   ITP Exercise;Psychosocial;Tobacco;Nutrition;Education   Exercise    DASI Total Score 15.45   Stages of Change Preparation   Assisted Devices Walker;Other (comment)  (Pt stated she uses furniture, walls, counters as a steady at home.)   Exercise Prescription   Mode Bike;Stepper;Ergometer   Frequency per week 2-3x/week   Duration Per Session 30-40 mins to start   Intensity METS       2-3 METs   RPE 8-15   Progression increase time on each modality by 3-5-mins as tolerated.   Symptoms with Exercise Other (comment)  (NA)   Target Heart Rate 62-86  ( (-20%))   Resistance Training No   Exercise Blood Pressures   Resting /58   Is BP WDL Yes   Exercise Activity at Home   Type None at the moment   Resistance Training No   Exercise Education   Education Self pulse;Exercise safety;Signs/symptoms to report;RPE scale;Equipment orientation;Warm up/cool down;Physically active   Exercise Target Goal   Target Goal(s) Individual exercise RX   Patient Stated Exercise Goals To improve overall strength and endurance   Psychosocial   Stages of Change Maintenance;Other (comment)  (Pt PHQ=4)   PHQ-9 Total Score 4   Psychosocial Intervention   Interventions No intervention indicated   Currently Taking Psychotropic Meds No   Medication Changes No   Psychosocial Education

## 2024-03-05 ENCOUNTER — APPOINTMENT (OUTPATIENT)
Dept: CARDIAC REHAB | Age: 79
End: 2024-03-05
Payer: MEDICARE

## 2024-03-05 RX ORDER — CLOPIDOGREL BISULFATE 75 MG/1
75 TABLET ORAL DAILY
Qty: 90 TABLET | Refills: 3 | Status: SHIPPED | OUTPATIENT
Start: 2024-03-05

## 2024-03-07 ENCOUNTER — HOSPITAL ENCOUNTER (OUTPATIENT)
Dept: CARDIAC REHAB | Age: 79
Setting detail: THERAPIES SERIES
Discharge: HOME | End: 2024-03-07
Payer: MEDICARE

## 2024-03-07 PROCEDURE — 93798 PHYS/QHP OP CAR RHAB W/ECG: CPT

## 2024-03-07 PROCEDURE — 93797 PHYS/QHP OP CAR RHAB WO ECG: CPT

## 2024-03-07 ASSESSMENT — EXERCISE STRESS TEST
PEAK_BP: 150/88
PEAK_HR: 61
PEAK_RPE: 11
PEAK_RPD: 0

## 2024-03-12 ENCOUNTER — HOSPITAL ENCOUNTER (OUTPATIENT)
Dept: CARDIAC REHAB | Age: 79
Setting detail: THERAPIES SERIES
Discharge: HOME | End: 2024-03-12
Payer: MEDICARE

## 2024-03-12 PROCEDURE — 93798 PHYS/QHP OP CAR RHAB W/ECG: CPT

## 2024-03-14 ENCOUNTER — HOSPITAL ENCOUNTER (OUTPATIENT)
Dept: CARDIAC REHAB | Age: 79
Setting detail: THERAPIES SERIES
Discharge: HOME | End: 2024-03-14
Payer: MEDICARE

## 2024-03-14 PROCEDURE — 93798 PHYS/QHP OP CAR RHAB W/ECG: CPT

## 2024-03-18 RX ORDER — BUMETANIDE 1 MG/1
1 TABLET ORAL 2 TIMES DAILY
Qty: 180 TABLET | Refills: 1 | Status: SHIPPED | OUTPATIENT
Start: 2024-03-18

## 2024-03-19 ENCOUNTER — APPOINTMENT (OUTPATIENT)
Dept: CARDIAC REHAB | Age: 79
End: 2024-03-19
Payer: MEDICARE

## 2024-03-21 ENCOUNTER — HOSPITAL ENCOUNTER (OUTPATIENT)
Dept: CARDIAC REHAB | Age: 79
Setting detail: THERAPIES SERIES
Discharge: HOME | End: 2024-03-21
Payer: MEDICARE

## 2024-03-21 PROCEDURE — 93798 PHYS/QHP OP CAR RHAB W/ECG: CPT

## 2024-03-26 ENCOUNTER — APPOINTMENT (OUTPATIENT)
Dept: CARDIAC REHAB | Age: 79
End: 2024-03-26
Payer: MEDICARE

## 2024-03-28 ENCOUNTER — HOSPITAL ENCOUNTER (OUTPATIENT)
Dept: CARDIAC REHAB | Age: 79
Setting detail: THERAPIES SERIES
Discharge: HOME | End: 2024-03-28
Payer: MEDICARE

## 2024-03-28 PROCEDURE — 93798 PHYS/QHP OP CAR RHAB W/ECG: CPT

## 2024-03-28 ASSESSMENT — PATIENT HEALTH QUESTIONNAIRE - PHQ9
7. TROUBLE CONCENTRATING ON THINGS, SUCH AS READING THE NEWSPAPER OR WATCHING TELEVISION: NOT AT ALL
SUM OF ALL RESPONSES TO PHQ QUESTIONS 1-9: 3
SUM OF ALL RESPONSES TO PHQ QUESTIONS 1-9: 3
5. POOR APPETITE OR OVEREATING: NOT AT ALL
8. MOVING OR SPEAKING SO SLOWLY THAT OTHER PEOPLE COULD HAVE NOTICED. OR THE OPPOSITE, BEING SO FIGETY OR RESTLESS THAT YOU HAVE BEEN MOVING AROUND A LOT MORE THAN USUAL: SEVERAL DAYS
2. FEELING DOWN, DEPRESSED OR HOPELESS: NOT AT ALL
SUM OF ALL RESPONSES TO PHQ QUESTIONS 1-9: 3
3. TROUBLE FALLING OR STAYING ASLEEP: SEVERAL DAYS
1. LITTLE INTEREST OR PLEASURE IN DOING THINGS: NOT AT ALL
SUM OF ALL RESPONSES TO PHQ QUESTIONS 1-9: 3
4. FEELING TIRED OR HAVING LITTLE ENERGY: SEVERAL DAYS
10. IF YOU CHECKED OFF ANY PROBLEMS, HOW DIFFICULT HAVE THESE PROBLEMS MADE IT FOR YOU TO DO YOUR WORK, TAKE CARE OF THINGS AT HOME, OR GET ALONG WITH OTHER PEOPLE: NOT DIFFICULT AT ALL
6. FEELING BAD ABOUT YOURSELF - OR THAT YOU ARE A FAILURE OR HAVE LET YOURSELF OR YOUR FAMILY DOWN: NOT AT ALL
9. THOUGHTS THAT YOU WOULD BE BETTER OFF DEAD, OR OF HURTING YOURSELF: NOT AT ALL
SUM OF ALL RESPONSES TO PHQ9 QUESTIONS 1 & 2: 0

## 2024-03-28 ASSESSMENT — LIFESTYLE VARIABLES
ALCOHOL_USE: SPECIAL
SMOKELESS_TOBACCO: NO
ALCOHOL_AMOUNT: 1-2
ALCOHOL_TYPE: SPIRITS

## 2024-03-28 ASSESSMENT — EJECTION FRACTION: EF_VALUE: 31

## 2024-03-28 ASSESSMENT — EXERCISE STRESS TEST: PEAK_BP: 130/64

## 2024-03-29 NOTE — PROGRESS NOTES
03/28/24 1300   Treatment Diagnosis   Treatment Diagnosis 1 HF   HF Date 03/01/04   Referral Date 02/13/24   Significant Cardiovascular History Previous PCI;Previous myocardial infarction;Chronic atrial fibrillation  (Previous PCI; Previous myocardial infarction; Chronic atrial fibrillation)   Co-morbidities Diabetes;Renal disease;Previous MI  (CKDIII,)   Oxygen Saturation / Titration    Stages of Change  Other  (Pt is not on supplemental O2)   Oxygen Intervention   Oxygen Use No   O2 Sat Greater Than 90% Yes   Individual Treatment Plan   ITP Visit Type Re-assessment   1st Date of Exercise  03/05/24   ITP Next Review Date 04/25/24   Visit #/Total Visits 7/36   EF% 31 %   Risk Stratification Moderate   ITP Exercise;Psychosocial;Tobacco;Nutrition;Education   Exercise    Stages of Change Action   Assisted Devices Walker;Other (comment)  (Pt stated she uses furniture, walls, counters as a steady at home.)   Exercise Prescription   Mode Bike;Stepper;Ergometer   Frequency per week 2-3x/week   Duration Per Session 30-40 mins to start   Intensity METS       2-3 METs   RPE 8-15   Progression increase time on each modality by 3-5-mins as tolerated.   Target Heart Rate 62-86  ( (-20%))   Resistance Training No   Exercise Blood Pressures   Resting /66   Peak /64   Is BP WDL Yes   Exercise Activity at Home   Type None at the moment   Resistance Training No   Exercise Education   Education Self pulse;Exercise safety;Signs/symptoms to report;RPE scale;Equipment orientation;Warm up/cool down;Physically active   Exercise Target Goal   Target Goal(s) Individual exercise RX   Patient Stated Exercise Goals To improve overall strength and endurance   Psychosocial   Stages of Change Maintenance   PHQ-9 Total Score 3   Psychosocial Intervention   Interventions No intervention indicated   Currently Taking Psychotropic Meds No   Medication Changes No   Psychosocial Education   Education Impact self care behaviors on

## 2024-04-02 ENCOUNTER — TELEPHONE (OUTPATIENT)
Dept: CARDIOLOGY CLINIC | Age: 79
End: 2024-04-02

## 2024-04-02 ENCOUNTER — HOSPITAL ENCOUNTER (OUTPATIENT)
Dept: CARDIAC REHAB | Age: 79
Setting detail: THERAPIES SERIES
Discharge: HOME | End: 2024-04-02
Payer: MEDICARE

## 2024-04-02 PROCEDURE — 93798 PHYS/QHP OP CAR RHAB W/ECG: CPT

## 2024-04-04 ENCOUNTER — HOSPITAL ENCOUNTER (OUTPATIENT)
Dept: CARDIAC REHAB | Age: 79
Setting detail: THERAPIES SERIES
Discharge: HOME | End: 2024-04-04
Payer: MEDICARE

## 2024-04-04 PROCEDURE — 93798 PHYS/QHP OP CAR RHAB W/ECG: CPT

## 2024-04-09 ENCOUNTER — HOSPITAL ENCOUNTER (OUTPATIENT)
Dept: CARDIAC REHAB | Age: 79
Setting detail: THERAPIES SERIES
Discharge: HOME | End: 2024-04-09
Payer: MEDICARE

## 2024-04-09 PROCEDURE — 93798 PHYS/QHP OP CAR RHAB W/ECG: CPT

## 2024-04-11 ENCOUNTER — HOSPITAL ENCOUNTER (OUTPATIENT)
Dept: CARDIAC REHAB | Age: 79
Setting detail: THERAPIES SERIES
Discharge: HOME | End: 2024-04-11
Payer: MEDICARE

## 2024-04-11 PROCEDURE — 93798 PHYS/QHP OP CAR RHAB W/ECG: CPT

## 2024-04-11 RX ORDER — SACUBITRIL AND VALSARTAN 24; 26 MG/1; MG/1
1 TABLET, FILM COATED ORAL 2 TIMES DAILY
Qty: 180 TABLET | Refills: 3 | Status: SHIPPED | OUTPATIENT
Start: 2024-04-11

## 2024-04-11 ASSESSMENT — PATIENT HEALTH QUESTIONNAIRE - PHQ9
SUM OF ALL RESPONSES TO PHQ9 QUESTIONS 1 & 2: 0
SUM OF ALL RESPONSES TO PHQ QUESTIONS 1-9: 4
5. POOR APPETITE OR OVEREATING: SEVERAL DAYS
4. FEELING TIRED OR HAVING LITTLE ENERGY: SEVERAL DAYS
SUM OF ALL RESPONSES TO PHQ QUESTIONS 1-9: 4
10. IF YOU CHECKED OFF ANY PROBLEMS, HOW DIFFICULT HAVE THESE PROBLEMS MADE IT FOR YOU TO DO YOUR WORK, TAKE CARE OF THINGS AT HOME, OR GET ALONG WITH OTHER PEOPLE: SOMEWHAT DIFFICULT
9. THOUGHTS THAT YOU WOULD BE BETTER OFF DEAD, OR OF HURTING YOURSELF: NOT AT ALL
1. LITTLE INTEREST OR PLEASURE IN DOING THINGS: NOT AT ALL
SUM OF ALL RESPONSES TO PHQ QUESTIONS 1-9: 4
6. FEELING BAD ABOUT YOURSELF - OR THAT YOU ARE A FAILURE OR HAVE LET YOURSELF OR YOUR FAMILY DOWN: NOT AT ALL
SUM OF ALL RESPONSES TO PHQ QUESTIONS 1-9: 4
3. TROUBLE FALLING OR STAYING ASLEEP: SEVERAL DAYS
7. TROUBLE CONCENTRATING ON THINGS, SUCH AS READING THE NEWSPAPER OR WATCHING TELEVISION: NOT AT ALL
8. MOVING OR SPEAKING SO SLOWLY THAT OTHER PEOPLE COULD HAVE NOTICED. OR THE OPPOSITE, BEING SO FIGETY OR RESTLESS THAT YOU HAVE BEEN MOVING AROUND A LOT MORE THAN USUAL: SEVERAL DAYS
2. FEELING DOWN, DEPRESSED OR HOPELESS: NOT AT ALL

## 2024-04-11 ASSESSMENT — EJECTION FRACTION: EF_VALUE: 31

## 2024-04-11 ASSESSMENT — LIFESTYLE VARIABLES
ALCOHOL_USE: SPECIAL
ALCOHOL_TYPE: SPIRITS
SMOKELESS_TOBACCO: NO
ALCOHOL_AMOUNT: 1-2

## 2024-04-11 ASSESSMENT — EXERCISE STRESS TEST: PEAK_BP: 134/68

## 2024-04-12 NOTE — PROGRESS NOTES
Adherent Yes   ASA Prescribed Yes   ASA Adherent Yes   Antiplatelet Prescribed Yes   Antiplatelet Adherent Yes   BB Prescribed Yes   BB Adherent Yes   Statins Prescribed Yes   Statins Adherent Yes   Statin Intensity High   Education Target Goals   Target Goals Medication compliance;Risk factors;Understand target guidelines for lipids;Understand target guidelines for B/P   Staff Treatment Goals   Goals Exercise;Nutrition   Exercise Goal Pt to improve overall endurance and stamina. Pt to progress to 60 minutes of aerobic exercise by following ExRx and progressions as tolerated.   Exercise Goal Status Progressing   Exercise Goal Comments Pt is very deconditioned.   Exercise Goal Assessment Key functional changes;Problems/barriers;Recommendations;Treatment time frame;Frequency/duration

## 2024-04-16 ENCOUNTER — HOSPITAL ENCOUNTER (OUTPATIENT)
Dept: CARDIAC REHAB | Age: 79
Setting detail: THERAPIES SERIES
Discharge: HOME | End: 2024-04-16
Payer: MEDICARE

## 2024-04-16 PROCEDURE — 93798 PHYS/QHP OP CAR RHAB W/ECG: CPT

## 2024-04-18 ENCOUNTER — APPOINTMENT (OUTPATIENT)
Dept: CARDIAC REHAB | Age: 79
End: 2024-04-18
Payer: MEDICARE

## 2024-04-23 ENCOUNTER — HOSPITAL ENCOUNTER (OUTPATIENT)
Dept: CARDIAC REHAB | Age: 79
Setting detail: THERAPIES SERIES
Discharge: HOME | End: 2024-04-23
Payer: MEDICARE

## 2024-04-23 PROCEDURE — 93798 PHYS/QHP OP CAR RHAB W/ECG: CPT

## 2024-04-25 ENCOUNTER — HOSPITAL ENCOUNTER (OUTPATIENT)
Dept: CARDIAC REHAB | Age: 79
Setting detail: THERAPIES SERIES
Discharge: HOME | End: 2024-04-25
Payer: MEDICARE

## 2024-04-25 PROCEDURE — 93798 PHYS/QHP OP CAR RHAB W/ECG: CPT

## 2024-04-26 ENCOUNTER — TELEPHONE (OUTPATIENT)
Dept: CARDIOLOGY CLINIC | Age: 79
End: 2024-04-26

## 2024-04-26 NOTE — TELEPHONE ENCOUNTER
No need for pre-medication.    If she needs to hold warfarin then okay to hold for 3 days prior.    Karel Velázquez D.O.  Cardiologist  Cardiology, [facility]

## 2024-04-30 ENCOUNTER — HOSPITAL ENCOUNTER (OUTPATIENT)
Dept: CARDIAC REHAB | Age: 79
Setting detail: THERAPIES SERIES
Discharge: HOME | End: 2024-04-30
Payer: MEDICARE

## 2024-04-30 PROCEDURE — 93798 PHYS/QHP OP CAR RHAB W/ECG: CPT

## 2024-05-02 ENCOUNTER — HOSPITAL ENCOUNTER (OUTPATIENT)
Age: 79
Discharge: HOME | End: 2024-05-04
Payer: MEDICARE

## 2024-05-02 ENCOUNTER — HOSPITAL ENCOUNTER (OUTPATIENT)
Dept: CARDIAC REHAB | Age: 79
Setting detail: THERAPIES SERIES
Discharge: HOME | End: 2024-05-02
Payer: MEDICARE

## 2024-05-02 VITALS
DIASTOLIC BLOOD PRESSURE: 64 MMHG | WEIGHT: 202 LBS | BODY MASS INDEX: 34.49 KG/M2 | SYSTOLIC BLOOD PRESSURE: 102 MMHG | HEIGHT: 64 IN

## 2024-05-02 DIAGNOSIS — I50.23 ACUTE ON CHRONIC SYSTOLIC CONGESTIVE HEART FAILURE (HCC): ICD-10-CM

## 2024-05-02 LAB
ECHO AV AREA PEAK VELOCITY: 2.9 CM2
ECHO AV AREA VTI: 2.7 CM2
ECHO AV AREA/BSA PEAK VELOCITY: 1.5 CM2/M2
ECHO AV AREA/BSA VTI: 1.4 CM2/M2
ECHO AV CUSP MM: 1.7 CM
ECHO AV MEAN GRADIENT: 4 MMHG
ECHO AV MEAN VELOCITY: 0.9 M/S
ECHO AV PEAK GRADIENT: 8 MMHG
ECHO AV PEAK VELOCITY: 1.4 M/S
ECHO AV VTI: 36.8 CM
ECHO BSA: 2.03 M2
ECHO EST RA PRESSURE: 3 MMHG
ECHO LA DIAMETER INDEX: 1.99 CM/M2
ECHO LA DIAMETER: 3.9 CM
ECHO LA VOL A-L A2C: 57 ML (ref 22–52)
ECHO LA VOL A-L A4C: 58 ML (ref 22–52)
ECHO LA VOL BP: 55 ML (ref 22–52)
ECHO LA VOL MOD A2C: 55 ML (ref 22–52)
ECHO LA VOL MOD A4C: 56 ML (ref 22–52)
ECHO LA VOL/BSA BIPLANE: 28 ML/M2 (ref 16–34)
ECHO LA VOLUME AREA LENGTH: 59 ML
ECHO LA VOLUME INDEX A-L A2C: 29 ML/M2 (ref 16–34)
ECHO LA VOLUME INDEX A-L A4C: 30 ML/M2 (ref 16–34)
ECHO LA VOLUME INDEX AREA LENGTH: 30 ML/M2 (ref 16–34)
ECHO LA VOLUME INDEX MOD A2C: 28 ML/M2 (ref 16–34)
ECHO LA VOLUME INDEX MOD A4C: 29 ML/M2 (ref 16–34)
ECHO LV EDV A2C: 137 ML
ECHO LV EDV A4C: 147 ML
ECHO LV EDV BP: 144 ML (ref 56–104)
ECHO LV EDV INDEX A4C: 75 ML/M2
ECHO LV EDV INDEX BP: 73 ML/M2
ECHO LV EDV NDEX A2C: 70 ML/M2
ECHO LV EJECTION FRACTION A2C: 46 %
ECHO LV EJECTION FRACTION A4C: 48 %
ECHO LV EJECTION FRACTION BIPLANE: 46 % (ref 55–100)
ECHO LV ESV A2C: 73 ML
ECHO LV ESV A4C: 77 ML
ECHO LV ESV BP: 78 ML (ref 19–49)
ECHO LV ESV INDEX A2C: 37 ML/M2
ECHO LV ESV INDEX A4C: 39 ML/M2
ECHO LV ESV INDEX BP: 40 ML/M2
ECHO LV FRACTIONAL SHORTENING: 17 % (ref 28–44)
ECHO LV INTERNAL DIMENSION DIASTOLE INDEX: 2.76 CM/M2
ECHO LV INTERNAL DIMENSION DIASTOLIC: 5.4 CM (ref 3.9–5.3)
ECHO LV INTERNAL DIMENSION SYSTOLIC INDEX: 2.3 CM/M2
ECHO LV INTERNAL DIMENSION SYSTOLIC: 4.5 CM
ECHO LV IVSD: 1.4 CM (ref 0.6–0.9)
ECHO LV IVSS: 1.4 CM
ECHO LV MASS 2D: 311.7 G (ref 67–162)
ECHO LV MASS INDEX 2D: 159.1 G/M2 (ref 43–95)
ECHO LV POSTERIOR WALL DIASTOLIC: 1.3 CM (ref 0.6–0.9)
ECHO LV POSTERIOR WALL SYSTOLIC: 1.2 CM
ECHO LV RELATIVE WALL THICKNESS RATIO: 0.48
ECHO LVOT AREA: 3.5 CM2
ECHO LVOT AV VTI INDEX: 0.77
ECHO LVOT DIAM: 2.1 CM
ECHO LVOT MEAN GRADIENT: 3 MMHG
ECHO LVOT PEAK GRADIENT: 5 MMHG
ECHO LVOT PEAK GRADIENT: 6 MMHG
ECHO LVOT PEAK VELOCITY: 1.1 M/S
ECHO LVOT PEAK VELOCITY: 1.2 M/S
ECHO LVOT STROKE VOLUME INDEX: 50.3 ML/M2
ECHO LVOT SV: 98.7 ML
ECHO LVOT VTI: 28.5 CM
ECHO MV "A" WAVE DURATION: 186.5 MSEC
ECHO MV A VELOCITY: 1.35 M/S
ECHO MV AREA PHT: 2.1 CM2
ECHO MV AREA VTI: 1.5 CM2
ECHO MV E DECELERATION TIME (DT): 427 MS
ECHO MV E VELOCITY: 1.11 M/S
ECHO MV E/A RATIO: 0.82
ECHO MV LVOT VTI INDEX: 2.25
ECHO MV MAX VELOCITY: 1.5 M/S
ECHO MV MEAN GRADIENT: 2 MMHG
ECHO MV MEAN VELOCITY: 0.6 M/S
ECHO MV PEAK GRADIENT: 9 MMHG
ECHO MV PRESSURE HALF TIME (PHT): 106.5 MS
ECHO MV VTI: 64 CM
ECHO PV MAX VELOCITY: 1 M/S
ECHO PV MEAN GRADIENT: 2 MMHG
ECHO PV MEAN VELOCITY: 0.7 M/S
ECHO PV PEAK GRADIENT: 4 MMHG
ECHO PV VTI: 27.2 CM
ECHO PVEIN A DURATION: 171.3 MS
ECHO PVEIN A VELOCITY: 0.3 M/S
ECHO PVEIN PEAK D VELOCITY: 0.4 M/S
ECHO PVEIN PEAK S VELOCITY: 0.5 M/S
ECHO PVEIN S/D RATIO: 1.3
ECHO RIGHT VENTRICULAR SYSTOLIC PRESSURE (RVSP): 26 MMHG
ECHO RV INTERNAL DIMENSION: 3.4 CM
ECHO RV LONGITUDINAL DIMENSION: 6.3 CM
ECHO RV MID DIMENSION: 2.9 CM
ECHO RVOT MEAN GRADIENT: 1 MMHG
ECHO RVOT PEAK GRADIENT: 1 MMHG
ECHO RVOT PEAK VELOCITY: 0.6 M/S
ECHO RVOT VTI: 12.5 CM
ECHO TV REGURGITANT MAX VELOCITY: 2.41 M/S
ECHO TV REGURGITANT PEAK GRADIENT: 23 MMHG

## 2024-05-02 PROCEDURE — C8924 2D TTE W OR W/O FOL W/CON,FU: HCPCS

## 2024-05-02 PROCEDURE — 93798 PHYS/QHP OP CAR RHAB W/ECG: CPT

## 2024-05-02 PROCEDURE — 6360000004 HC RX CONTRAST MEDICATION: Performed by: INTERNAL MEDICINE

## 2024-05-02 PROCEDURE — 93308 TTE F-UP OR LMTD: CPT | Performed by: INTERNAL MEDICINE

## 2024-05-02 RX ADMIN — PERFLUTREN 1.5 ML: 6.52 INJECTION, SUSPENSION INTRAVENOUS at 10:56

## 2024-05-07 ENCOUNTER — HOSPITAL ENCOUNTER (OUTPATIENT)
Dept: CARDIAC REHAB | Age: 79
Setting detail: THERAPIES SERIES
Discharge: HOME | End: 2024-05-07
Payer: MEDICARE

## 2024-05-07 PROCEDURE — 93798 PHYS/QHP OP CAR RHAB W/ECG: CPT

## 2024-05-07 ASSESSMENT — EJECTION FRACTION: EF_VALUE: 31

## 2024-05-07 ASSESSMENT — LIFESTYLE VARIABLES
ALCOHOL_USE: SPECIAL
ALCOHOL_AMOUNT: 1-2
ALCOHOL_TYPE: SPIRITS
SMOKELESS_TOBACCO: NO

## 2024-05-07 ASSESSMENT — PATIENT HEALTH QUESTIONNAIRE - PHQ9
10. IF YOU CHECKED OFF ANY PROBLEMS, HOW DIFFICULT HAVE THESE PROBLEMS MADE IT FOR YOU TO DO YOUR WORK, TAKE CARE OF THINGS AT HOME, OR GET ALONG WITH OTHER PEOPLE: SOMEWHAT DIFFICULT
SUM OF ALL RESPONSES TO PHQ QUESTIONS 1-9: 4
SUM OF ALL RESPONSES TO PHQ QUESTIONS 1-9: 4
2. FEELING DOWN, DEPRESSED OR HOPELESS: NOT AT ALL
5. POOR APPETITE OR OVEREATING: NOT AT ALL
7. TROUBLE CONCENTRATING ON THINGS, SUCH AS READING THE NEWSPAPER OR WATCHING TELEVISION: SEVERAL DAYS
6. FEELING BAD ABOUT YOURSELF - OR THAT YOU ARE A FAILURE OR HAVE LET YOURSELF OR YOUR FAMILY DOWN: NOT AT ALL
9. THOUGHTS THAT YOU WOULD BE BETTER OFF DEAD, OR OF HURTING YOURSELF: NOT AT ALL
3. TROUBLE FALLING OR STAYING ASLEEP: MORE THAN HALF THE DAYS
8. MOVING OR SPEAKING SO SLOWLY THAT OTHER PEOPLE COULD HAVE NOTICED. OR THE OPPOSITE, BEING SO FIGETY OR RESTLESS THAT YOU HAVE BEEN MOVING AROUND A LOT MORE THAN USUAL: NOT AT ALL
SUM OF ALL RESPONSES TO PHQ QUESTIONS 1-9: 4
SUM OF ALL RESPONSES TO PHQ9 QUESTIONS 1 & 2: 0
4. FEELING TIRED OR HAVING LITTLE ENERGY: SEVERAL DAYS
1. LITTLE INTEREST OR PLEASURE IN DOING THINGS: NOT AT ALL
SUM OF ALL RESPONSES TO PHQ QUESTIONS 1-9: 4

## 2024-05-07 ASSESSMENT — EXERCISE STRESS TEST: PEAK_BP: 126/74

## 2024-05-09 ENCOUNTER — HOSPITAL ENCOUNTER (OUTPATIENT)
Dept: CARDIAC REHAB | Age: 79
Setting detail: THERAPIES SERIES
Discharge: HOME | End: 2024-05-09
Payer: MEDICARE

## 2024-05-09 PROCEDURE — 94626 PHY/QHP OP PULM RHB W/MNTR: CPT

## 2024-05-09 NOTE — PROGRESS NOTES
05/07/24 1100   Treatment Diagnosis   Treatment Diagnosis 1 HF   HF Date 03/01/04   Referral Date 02/13/24   Significant Cardiovascular History Previous PCI;Previous myocardial infarction;Chronic atrial fibrillation  (Previous PCI; Previous myocardial infarction; Chronic atrial fibrillation)   Co-morbidities Diabetes;Renal disease;Previous MI  (CKDIII)   Oxygen Saturation / Titration    Stages of Change  Other  (Pt is not on supplemental O2)   Oxygen Intervention   Oxygen Use No   O2 Sat Greater Than 90% Yes   Individual Treatment Plan   ITP Visit Type Re-assessment   1st Date of Exercise  03/05/24   ITP Next Review Date 05/28/24   Visit #/Total Visits 17/36   EF% 31 %   Risk Stratification Moderate   ITP Exercise;Psychosocial;Tobacco;Nutrition;Education   Exercise    Stages of Change Action   Assisted Devices Walker;Other (comment)  (Pt stated she uses furniture, walls, counters as a steady at home.)   Exercise Prescription   Mode Bike;Stepper;Ergometer   Frequency per week 2-3x/week   Duration Per Session 60 minutes   Intensity METS       2-3 METs   RPE 8-15   Progression increase METs on each modality by 0.3-0.5 as tolerated.   Symptoms with Exercise Other (comment)  (Pt denies any symptoms with exercise)   Target Heart Rate 62-86  ( (-20%)   Resistance Training No   Exercise Blood Pressures   Resting /62   Peak /74   Is BP WDL Yes   Exercise Activity at Home   Type None at the moment   Resistance Training No   Exercise Education   Education Self pulse;Exercise safety;Signs/symptoms to report;RPE scale;Equipment orientation;Warm up/cool down;Physically active   Exercise Target Goal   Target Goal(s) Individual exercise RX   Patient Stated Exercise Goals To improve overall strength and endurance   Psychosocial   Stages of Change Maintenance;Preparation   PHQ-9 Total Score 4   Psychosocial Intervention   Interventions No intervention indicated   Currently Taking Psychotropic Meds No   Medication

## 2024-05-14 ENCOUNTER — HOSPITAL ENCOUNTER (OUTPATIENT)
Dept: CARDIAC REHAB | Age: 79
Setting detail: THERAPIES SERIES
Discharge: HOME | End: 2024-05-14
Payer: MEDICARE

## 2024-05-14 PROCEDURE — 93798 PHYS/QHP OP CAR RHAB W/ECG: CPT

## 2024-05-16 ENCOUNTER — HOSPITAL ENCOUNTER (OUTPATIENT)
Dept: CARDIAC REHAB | Age: 79
Setting detail: THERAPIES SERIES
Discharge: HOME | End: 2024-05-16
Payer: MEDICARE

## 2024-05-16 PROCEDURE — 93798 PHYS/QHP OP CAR RHAB W/ECG: CPT

## 2024-05-16 PROCEDURE — 93797 PHYS/QHP OP CAR RHAB WO ECG: CPT

## 2024-05-16 NOTE — CONSULTS
Initial Assessment      Date: 5/16/24   Time: 1030am   Patient Name: María Macias   Referring Clinician: Ambar Jarquin MD   Fax: [phone]   Reason for Visit: Initial nutrition assessment and diet education   Pt with CHF, DM, obesity, with h/o CAD, MI.     Goals:  Decrease sodium intake through better food choices and label reading  Include better meal balance with consistent meals and snacks for improved BS control.  More closely monitor carbohydrates/added sugars in diet along with portion control for BS control and wt reduction.    Current Eating Pattern:  Pt eating 2 meals and snacks.  Will drink Ensure or [place] Instant Breakfast when doesn't have time to fix breakfast, otherwise will eat cheerios with 2% lactaid milk and fruit.  Lunch is shrimp with ramen noodles or sandwich on [employer] Rye bread with ham, salami, and couple slices of swiss cheese and mustard.  Dinner is generally prepared by her daughter such as pulled pork sliders and carrol slaw.  She snacks on roasted pistachios, salted pretzels and cheese or cheddar crackers.  She does not eat much red meat.  She reports intolerance to sweetners/sugar substitutes and is lactose intolerant.  She drinks 4 - 20 oz bottles of Vitamin water a day = 104gm CHO.      Past Nutrition Hx:  Pt used to drink a 6 pack of Pepsi a day then switched to diet pepsi but now eliminates due to reported intolerance.      Allergies/Food Sensitivities:   Lactose intolerant, other wise NKA    Weight Hx:  Pt reports weighing 275# approx ten years ago but has gradually lost about 75#.  She would like to continue wt loss to a goal of 175#.    IBW = 120# +/- 10%.    %AXJ=030%    Current Exercise Pattern:  Per program 2-3 x/wk    Sex:Female  Age: 78 y.o.  Ht: 5'4\"  CBW: 200#  BMI: 34.3 (Obesity)    Medical Hx:  Past Medical History:   Diagnosis Date    Atrial fibrillation (HCC)     CAD (coronary artery disease)     CHF (congestive heart failure) (HCC) 2004    after heart

## 2024-05-21 ENCOUNTER — APPOINTMENT (OUTPATIENT)
Dept: CARDIAC REHAB | Age: 79
End: 2024-05-21
Payer: MEDICARE

## 2024-05-23 ENCOUNTER — HOSPITAL ENCOUNTER (OUTPATIENT)
Dept: CARDIAC REHAB | Age: 79
Setting detail: THERAPIES SERIES
Discharge: HOME | End: 2024-05-23
Payer: MEDICARE

## 2024-05-23 PROCEDURE — 93798 PHYS/QHP OP CAR RHAB W/ECG: CPT

## 2024-05-28 ENCOUNTER — APPOINTMENT (OUTPATIENT)
Dept: CARDIAC REHAB | Age: 79
End: 2024-05-28
Payer: MEDICARE

## 2024-05-29 ENCOUNTER — APPOINTMENT (OUTPATIENT)
Dept: MRI IMAGING | Age: 79
End: 2024-05-29
Payer: MEDICARE

## 2024-05-29 ENCOUNTER — APPOINTMENT (OUTPATIENT)
Dept: CT IMAGING | Age: 79
End: 2024-05-29
Payer: MEDICARE

## 2024-05-29 ENCOUNTER — HOSPITAL ENCOUNTER (INPATIENT)
Age: 79
LOS: 2 days | Discharge: HOSPICE | End: 2024-05-31
Attending: EMERGENCY MEDICINE | Admitting: INTERNAL MEDICINE
Payer: MEDICARE

## 2024-05-29 DIAGNOSIS — T42.3X1A: ICD-10-CM

## 2024-05-29 DIAGNOSIS — I63.9 ACUTE CVA (CEREBROVASCULAR ACCIDENT) (HCC): ICD-10-CM

## 2024-05-29 DIAGNOSIS — I21.4 NSTEMI (NON-ST ELEVATED MYOCARDIAL INFARCTION) (HCC): ICD-10-CM

## 2024-05-29 DIAGNOSIS — I63.9 CEREBROVASCULAR ACCIDENT (CVA), UNSPECIFIED MECHANISM (HCC): Primary | ICD-10-CM

## 2024-05-29 DIAGNOSIS — R41.82 ALTERED MENTAL STATUS, UNSPECIFIED ALTERED MENTAL STATUS TYPE: ICD-10-CM

## 2024-05-29 DIAGNOSIS — Z71.89 DNR (DO NOT RESUSCITATE) DISCUSSION: ICD-10-CM

## 2024-05-29 PROBLEM — I61.9 CVA (CEREBROVASCULAR ACCIDENT DUE TO INTRACEREBRAL HEMORRHAGE) (HCC): Status: ACTIVE | Noted: 2024-05-29

## 2024-05-29 LAB
AADO2: 514.8 MMHG
ALBUMIN SERPL-MCNC: 4.1 G/DL (ref 3.5–5.2)
ALLEN TEST: POSITIVE
ALP SERPL-CCNC: 86 U/L (ref 35–104)
ALT SERPL-CCNC: 13 U/L (ref 0–32)
AMPHET UR QL SCN: NEGATIVE
ANION GAP SERPL CALCULATED.3IONS-SCNC: 13 MMOL/L (ref 7–16)
APAP SERPL-MCNC: <5 UG/ML (ref 10–30)
AST SERPL-CCNC: 19 U/L (ref 0–31)
B-OH-BUTYR SERPL-MCNC: 0.15 MMOL/L (ref 0.02–0.27)
B.E.: -3 MMOL/L (ref -3–3)
BARBITURATES UR QL SCN: POSITIVE
BASOPHILS # BLD: 0.12 K/UL (ref 0–0.2)
BASOPHILS NFR BLD: 1 % (ref 0–2)
BENZODIAZ UR QL: NEGATIVE
BILIRUB SERPL-MCNC: 0.4 MG/DL (ref 0–1.2)
BILIRUB UR QL STRIP: NEGATIVE
BNP SERPL-MCNC: 977 PG/ML (ref 0–450)
BUN SERPL-MCNC: 50 MG/DL (ref 6–23)
BUPRENORPHINE UR QL: NEGATIVE
CALCIUM SERPL-MCNC: 8.7 MG/DL (ref 8.6–10.2)
CANNABINOIDS UR QL SCN: NEGATIVE
CHLORIDE SERPL-SCNC: 104 MMOL/L (ref 98–107)
CK SERPL-CCNC: 239 U/L (ref 20–180)
CK SERPL-CCNC: 282 U/L (ref 20–180)
CLARITY UR: CLEAR
CO2 SERPL-SCNC: 24 MMOL/L (ref 22–29)
COCAINE UR QL SCN: NEGATIVE
COHB: 0.3 % (ref 0–1.5)
COLOR UR: YELLOW
CORTIS SERPL-MCNC: 25.1 UG/DL (ref 2.7–18.4)
CORTISOL COLLECTION INFO: ABNORMAL
CREAT SERPL-MCNC: 1.4 MG/DL (ref 0.5–1)
CRITICAL: ABNORMAL
DATE ANALYZED: ABNORMAL
DATE OF COLLECTION: ABNORMAL
EKG ATRIAL RATE: 72 BPM
EKG P AXIS: 56 DEGREES
EKG P-R INTERVAL: 170 MS
EKG Q-T INTERVAL: 426 MS
EKG QRS DURATION: 102 MS
EKG QTC CALCULATION (BAZETT): 466 MS
EKG R AXIS: -14 DEGREES
EKG T AXIS: 63 DEGREES
EKG VENTRICULAR RATE: 72 BPM
EOSINOPHIL # BLD: 0.17 K/UL (ref 0.05–0.5)
EOSINOPHILS RELATIVE PERCENT: 1 % (ref 0–6)
EPI CELLS #/AREA URNS HPF: ABNORMAL /HPF
ERYTHROCYTE [DISTWIDTH] IN BLOOD BY AUTOMATED COUNT: 13.2 % (ref 11.5–15)
ETHANOLAMINE SERPL-MCNC: <10 MG/DL (ref 0–0.08)
FENTANYL UR QL: NEGATIVE
FIO2: 100 %
FIO2: 21
GFR, ESTIMATED: 38 ML/MIN/1.73M2
GLUCOSE BLD-MCNC: 335 MG/DL (ref 74–99)
GLUCOSE SERPL-MCNC: 336 MG/DL (ref 74–99)
GLUCOSE UR STRIP-MCNC: >=1000 MG/DL
HCO3: 22.6 MMOL/L (ref 22–26)
HCT VFR BLD AUTO: 34.2 % (ref 34–48)
HGB BLD-MCNC: 11.6 G/DL (ref 11.5–15.5)
HGB UR QL STRIP.AUTO: NEGATIVE
HHB: 1.5 % (ref 0–5)
IMM GRANULOCYTES # BLD AUTO: 0.19 K/UL (ref 0–0.58)
IMM GRANULOCYTES NFR BLD: 1 % (ref 0–5)
INR PPP: 2.1
KETONES UR STRIP-MCNC: NEGATIVE MG/DL
LAB: ABNORMAL
LACTATE BLDV-SCNC: 1.7 MMOL/L (ref 0.5–2.2)
LEUKOCYTE ESTERASE UR QL STRIP: NEGATIVE
LYMPHOCYTES NFR BLD: 1.43 K/UL (ref 1.5–4)
LYMPHOCYTES RELATIVE PERCENT: 9 % (ref 20–42)
Lab: 800
MCH RBC QN AUTO: 30.4 PG (ref 26–35)
MCHC RBC AUTO-ENTMCNC: 33.9 G/DL (ref 32–34.5)
MCV RBC AUTO: 89.5 FL (ref 80–99.9)
METHADONE UR QL: NEGATIVE
METHB: 0.1 % (ref 0–1.5)
MODE: ABNORMAL
MONOCYTES NFR BLD: 0.79 K/UL (ref 0.1–0.95)
MONOCYTES NFR BLD: 5 % (ref 2–12)
NEGATIVE BASE EXCESS, ART: 1.2 MMOL/L
NEUTROPHILS NFR BLD: 83 % (ref 43–80)
NEUTS SEG NFR BLD: 13.35 K/UL (ref 1.8–7.3)
NITRITE UR QL STRIP: NEGATIVE
O2 CONTENT: 19.4 ML/DL
O2 SATURATION: 98.5 % (ref 92–98.5)
O2HB: 98.1 % (ref 94–97)
OPERATOR ID: 274
OPIATES UR QL SCN: NEGATIVE
OXYCODONE UR QL SCN: NEGATIVE
PARTIAL THROMBOPLASTIN TIME: 38.9 SEC (ref 24.5–35.1)
PATIENT TEMP: 37
PATIENT TEMP: 37 C
PCO2: 42.3 MMHG (ref 35–45)
PCP UR QL SCN: NEGATIVE
PFO2: 1.31 MMHG/%
PH BLOOD GAS: 7.35 (ref 7.35–7.45)
PH UR STRIP: 6 [PH] (ref 5–9)
PH VENOUS: 7.29 (ref 7.35–7.45)
PLATELET, FLUORESCENCE: 259 K/UL (ref 130–450)
PMV BLD AUTO: 10.5 FL (ref 7–12)
PO2: 130.9 MMHG (ref 75–100)
POC HCO3: 24.5 MMOL/L (ref 22–26)
POC O2 SATURATION: 88.6 % (ref 92–98.5)
POC PCO2 TEMP: 43.9 MM HG
POC PCO2: 43.9 MM HG (ref 35–45)
POC PH TEMP: 7.36
POC PH: 7.36 (ref 7.35–7.45)
POC PO2 TEMP: 58.6 MM HG
POC PO2: 58.6 MM HG (ref 60–80)
POTASSIUM SERPL-SCNC: 4.6 MMOL/L (ref 3.5–5)
PROT SERPL-MCNC: 7.2 G/DL (ref 6.4–8.3)
PROT UR STRIP-MCNC: NEGATIVE MG/DL
PROTHROMBIN TIME: 24 SEC (ref 9.3–12.4)
RBC # BLD AUTO: 3.82 M/UL (ref 3.5–5.5)
RBC #/AREA URNS HPF: ABNORMAL /HPF
RI(T): 3.93
SALICYLATES SERPL-MCNC: <0.3 MG/DL (ref 0–30)
SAMPLE SITE: ABNORMAL
SODIUM SERPL-SCNC: 141 MMOL/L (ref 132–146)
SOURCE, BLOOD GAS: ABNORMAL
SP GR UR STRIP: 1.01 (ref 1–1.03)
T4 SERPL-MCNC: 7.4 UG/DL (ref 4.5–11.7)
TEST INFORMATION: ABNORMAL
THB: 13.9 G/DL (ref 11.5–16.5)
TIME ANALYZED: 810
TOXIC TRICYCLIC SC,BLOOD: NEGATIVE
TROPONIN I SERPL HS-MCNC: 111 NG/L (ref 0–9)
TROPONIN I SERPL HS-MCNC: 129 NG/L (ref 0–9)
TROPONIN I SERPL HS-MCNC: 276 NG/L (ref 0–9)
TROPONIN I SERPL HS-MCNC: 512 NG/L (ref 0–9)
TSH SERPL DL<=0.05 MIU/L-ACNC: 0.71 UIU/ML (ref 0.27–4.2)
UROBILINOGEN UR STRIP-ACNC: 0.2 EU/DL (ref 0–1)
WBC #/AREA URNS HPF: ABNORMAL /HPF
WBC OTHER # BLD: 16.1 K/UL (ref 4.5–11.5)

## 2024-05-29 PROCEDURE — 70450 CT HEAD/BRAIN W/O DYE: CPT

## 2024-05-29 PROCEDURE — 2580000003 HC RX 258: Performed by: INTERNAL MEDICINE

## 2024-05-29 PROCEDURE — 71275 CT ANGIOGRAPHY CHEST: CPT

## 2024-05-29 PROCEDURE — 85025 COMPLETE CBC W/AUTO DIFF WBC: CPT

## 2024-05-29 PROCEDURE — 93010 ELECTROCARDIOGRAM REPORT: CPT | Performed by: INTERNAL MEDICINE

## 2024-05-29 PROCEDURE — 82533 TOTAL CORTISOL: CPT

## 2024-05-29 PROCEDURE — 80307 DRUG TEST PRSMV CHEM ANLYZR: CPT

## 2024-05-29 PROCEDURE — 2580000003 HC RX 258

## 2024-05-29 PROCEDURE — 82962 GLUCOSE BLOOD TEST: CPT

## 2024-05-29 PROCEDURE — 2580000003 HC RX 258: Performed by: EMERGENCY MEDICINE

## 2024-05-29 PROCEDURE — 93005 ELECTROCARDIOGRAM TRACING: CPT

## 2024-05-29 PROCEDURE — 82803 BLOOD GASES ANY COMBINATION: CPT

## 2024-05-29 PROCEDURE — 96374 THER/PROPH/DIAG INJ IV PUSH: CPT

## 2024-05-29 PROCEDURE — 74177 CT ABD & PELVIS W/CONTRAST: CPT

## 2024-05-29 PROCEDURE — 99285 EMERGENCY DEPT VISIT HI MDM: CPT

## 2024-05-29 PROCEDURE — 99292 CRITICAL CARE ADDL 30 MIN: CPT | Performed by: INTERNAL MEDICINE

## 2024-05-29 PROCEDURE — 84436 ASSAY OF TOTAL THYROXINE: CPT

## 2024-05-29 PROCEDURE — 85730 THROMBOPLASTIN TIME PARTIAL: CPT

## 2024-05-29 PROCEDURE — 87077 CULTURE AEROBIC IDENTIFY: CPT

## 2024-05-29 PROCEDURE — C9113 INJ PANTOPRAZOLE SODIUM, VIA: HCPCS | Performed by: INTERNAL MEDICINE

## 2024-05-29 PROCEDURE — 80179 DRUG ASSAY SALICYLATE: CPT

## 2024-05-29 PROCEDURE — 6360000002 HC RX W HCPCS

## 2024-05-29 PROCEDURE — 80143 DRUG ASSAY ACETAMINOPHEN: CPT

## 2024-05-29 PROCEDURE — 87040 BLOOD CULTURE FOR BACTERIA: CPT

## 2024-05-29 PROCEDURE — 84484 ASSAY OF TROPONIN QUANT: CPT

## 2024-05-29 PROCEDURE — 99223 1ST HOSP IP/OBS HIGH 75: CPT | Performed by: INTERNAL MEDICINE

## 2024-05-29 PROCEDURE — A4216 STERILE WATER/SALINE, 10 ML: HCPCS | Performed by: INTERNAL MEDICINE

## 2024-05-29 PROCEDURE — 82800 BLOOD PH: CPT

## 2024-05-29 PROCEDURE — 82805 BLOOD GASES W/O2 SATURATION: CPT

## 2024-05-29 PROCEDURE — 87086 URINE CULTURE/COLONY COUNT: CPT

## 2024-05-29 PROCEDURE — G0480 DRUG TEST DEF 1-7 CLASSES: HCPCS

## 2024-05-29 PROCEDURE — 82550 ASSAY OF CK (CPK): CPT

## 2024-05-29 PROCEDURE — 6360000002 HC RX W HCPCS: Performed by: INTERNAL MEDICINE

## 2024-05-29 PROCEDURE — 87154 CUL TYP ID BLD PTHGN 6+ TRGT: CPT

## 2024-05-29 PROCEDURE — 36600 WITHDRAWAL OF ARTERIAL BLOOD: CPT

## 2024-05-29 PROCEDURE — 80053 COMPREHEN METABOLIC PANEL: CPT

## 2024-05-29 PROCEDURE — 83880 ASSAY OF NATRIURETIC PEPTIDE: CPT

## 2024-05-29 PROCEDURE — 70496 CT ANGIOGRAPHY HEAD: CPT

## 2024-05-29 PROCEDURE — 81001 URINALYSIS AUTO W/SCOPE: CPT

## 2024-05-29 PROCEDURE — 96375 TX/PRO/DX INJ NEW DRUG ADDON: CPT

## 2024-05-29 PROCEDURE — 99291 CRITICAL CARE FIRST HOUR: CPT | Performed by: INTERNAL MEDICINE

## 2024-05-29 PROCEDURE — 0042T CT BRAIN PERFUSION: CPT

## 2024-05-29 PROCEDURE — 82010 KETONE BODYS QUAN: CPT

## 2024-05-29 PROCEDURE — APPSS60 APP SPLIT SHARED TIME 46-60 MINUTES: Performed by: CLINICAL NURSE SPECIALIST

## 2024-05-29 PROCEDURE — 83605 ASSAY OF LACTIC ACID: CPT

## 2024-05-29 PROCEDURE — 36415 COLL VENOUS BLD VENIPUNCTURE: CPT

## 2024-05-29 PROCEDURE — 70498 CT ANGIOGRAPHY NECK: CPT

## 2024-05-29 PROCEDURE — 85610 PROTHROMBIN TIME: CPT

## 2024-05-29 PROCEDURE — 72125 CT NECK SPINE W/O DYE: CPT

## 2024-05-29 PROCEDURE — 1200000000 HC SEMI PRIVATE

## 2024-05-29 PROCEDURE — 6360000002 HC RX W HCPCS: Performed by: PSYCHIATRY & NEUROLOGY

## 2024-05-29 PROCEDURE — 84443 ASSAY THYROID STIM HORMONE: CPT

## 2024-05-29 PROCEDURE — 6360000004 HC RX CONTRAST MEDICATION: Performed by: RADIOLOGY

## 2024-05-29 RX ORDER — ACETAMINOPHEN 500 MG
1000 TABLET ORAL 2 TIMES DAILY
Status: ON HOLD | COMMUNITY
End: 2024-05-30 | Stop reason: HOSPADM

## 2024-05-29 RX ORDER — DEXAMETHASONE SODIUM PHOSPHATE 10 MG/ML
10 INJECTION INTRAMUSCULAR; INTRAVENOUS EVERY 6 HOURS
Status: DISCONTINUED | OUTPATIENT
Start: 2024-05-29 | End: 2024-05-29

## 2024-05-29 RX ORDER — SODIUM CHLORIDE 0.9 % (FLUSH) 0.9 %
SYRINGE (ML) INJECTION
Status: DISPENSED
Start: 2024-05-29 | End: 2024-05-30

## 2024-05-29 RX ORDER — M-VIT,TX,IRON,MINS/CALC/FOLIC 27MG-0.4MG
1 TABLET ORAL DAILY
Status: ON HOLD | COMMUNITY
End: 2024-05-30 | Stop reason: HOSPADM

## 2024-05-29 RX ORDER — IPRATROPIUM BROMIDE AND ALBUTEROL SULFATE 2.5; .5 MG/3ML; MG/3ML
1 SOLUTION RESPIRATORY (INHALATION)
Status: DISCONTINUED | OUTPATIENT
Start: 2024-05-29 | End: 2024-05-29

## 2024-05-29 RX ORDER — HEPARIN SODIUM 10000 [USP'U]/100ML
5-30 INJECTION, SOLUTION INTRAVENOUS CONTINUOUS
Status: DISCONTINUED | OUTPATIENT
Start: 2024-05-29 | End: 2024-05-29

## 2024-05-29 RX ORDER — HYOSCYAMINE SULFATE 0.38 MG/1
375 TABLET, EXTENDED RELEASE ORAL 2 TIMES DAILY
Status: ON HOLD | COMMUNITY
End: 2024-05-30 | Stop reason: HOSPADM

## 2024-05-29 RX ORDER — THIAMINE HYDROCHLORIDE 100 MG/ML
100 INJECTION, SOLUTION INTRAMUSCULAR; INTRAVENOUS DAILY
Status: DISCONTINUED | OUTPATIENT
Start: 2024-05-29 | End: 2024-05-30

## 2024-05-29 RX ORDER — DIPHENHYDRAMINE HYDROCHLORIDE 50 MG/ML
25 INJECTION INTRAMUSCULAR; INTRAVENOUS ONCE
Status: COMPLETED | OUTPATIENT
Start: 2024-05-29 | End: 2024-05-29

## 2024-05-29 RX ORDER — INSULIN LISPRO 100 [IU]/ML
0-8 INJECTION, SOLUTION INTRAVENOUS; SUBCUTANEOUS EVERY 4 HOURS
Status: DISCONTINUED | OUTPATIENT
Start: 2024-05-29 | End: 2024-05-29

## 2024-05-29 RX ORDER — HEPARIN SODIUM 1000 [USP'U]/ML
2000 INJECTION, SOLUTION INTRAVENOUS; SUBCUTANEOUS PRN
Status: DISCONTINUED | OUTPATIENT
Start: 2024-05-29 | End: 2024-05-29

## 2024-05-29 RX ORDER — 0.9 % SODIUM CHLORIDE 0.9 %
1000 INTRAVENOUS SOLUTION INTRAVENOUS ONCE
Status: COMPLETED | OUTPATIENT
Start: 2024-05-29 | End: 2024-05-29

## 2024-05-29 RX ORDER — WARFARIN SODIUM 6 MG/1
6 TABLET ORAL DAILY
Status: ON HOLD | COMMUNITY
End: 2024-05-30 | Stop reason: HOSPADM

## 2024-05-29 RX ORDER — PANTOPRAZOLE SODIUM 40 MG/1
40 TABLET, DELAYED RELEASE ORAL DAILY
Status: ON HOLD | COMMUNITY
End: 2024-05-30 | Stop reason: HOSPADM

## 2024-05-29 RX ORDER — MORPHINE SULFATE 4 MG/ML
4 INJECTION, SOLUTION INTRAMUSCULAR; INTRAVENOUS ONCE
Status: COMPLETED | OUTPATIENT
Start: 2024-05-29 | End: 2024-05-29

## 2024-05-29 RX ORDER — HEPARIN SODIUM 1000 [USP'U]/ML
4000 INJECTION, SOLUTION INTRAVENOUS; SUBCUTANEOUS ONCE
Status: COMPLETED | OUTPATIENT
Start: 2024-05-29 | End: 2024-05-29

## 2024-05-29 RX ORDER — BUDESONIDE 0.5 MG/2ML
0.5 INHALANT ORAL
Status: DISCONTINUED | OUTPATIENT
Start: 2024-05-29 | End: 2024-05-29

## 2024-05-29 RX ORDER — HEPARIN SODIUM 1000 [USP'U]/ML
4000 INJECTION, SOLUTION INTRAVENOUS; SUBCUTANEOUS PRN
Status: DISCONTINUED | OUTPATIENT
Start: 2024-05-29 | End: 2024-05-29

## 2024-05-29 RX ORDER — DIPHENHYDRAMINE HYDROCHLORIDE 50 MG/ML
50 INJECTION INTRAMUSCULAR; INTRAVENOUS ONCE
Status: COMPLETED | OUTPATIENT
Start: 2024-05-29 | End: 2024-05-29

## 2024-05-29 RX ADMIN — CEFTRIAXONE SODIUM 2000 MG: 2 INJECTION, POWDER, FOR SOLUTION INTRAMUSCULAR; INTRAVENOUS at 11:45

## 2024-05-29 RX ADMIN — DEXAMETHASONE SODIUM PHOSPHATE 10 MG: 10 INJECTION INTRAMUSCULAR; INTRAVENOUS at 11:39

## 2024-05-29 RX ADMIN — HEPARIN SODIUM 4000 UNITS: 1000 INJECTION INTRAVENOUS; SUBCUTANEOUS at 12:11

## 2024-05-29 RX ADMIN — DIPHENHYDRAMINE HYDROCHLORIDE 25 MG: 50 INJECTION INTRAMUSCULAR; INTRAVENOUS at 05:59

## 2024-05-29 RX ADMIN — SODIUM CHLORIDE 1000 ML: 9 INJECTION, SOLUTION INTRAVENOUS at 06:00

## 2024-05-29 RX ADMIN — VANCOMYCIN HYDROCHLORIDE 1500 MG: 10 INJECTION, POWDER, LYOPHILIZED, FOR SOLUTION INTRAVENOUS at 13:46

## 2024-05-29 RX ADMIN — THIAMINE HYDROCHLORIDE 100 MG: 100 INJECTION, SOLUTION INTRAMUSCULAR; INTRAVENOUS at 19:01

## 2024-05-29 RX ADMIN — HYDROCORTISONE SODIUM SUCCINATE 200 MG: 100 INJECTION, POWDER, FOR SOLUTION INTRAMUSCULAR; INTRAVENOUS at 13:40

## 2024-05-29 RX ADMIN — HEPARIN SODIUM AND DEXTROSE 10 UNITS/KG/HR: 10000; 5 INJECTION INTRAVENOUS at 12:19

## 2024-05-29 RX ADMIN — IOPAMIDOL 150 ML: 755 INJECTION, SOLUTION INTRAVENOUS at 14:28

## 2024-05-29 RX ADMIN — DIPHENHYDRAMINE HYDROCHLORIDE 50 MG: 50 INJECTION INTRAMUSCULAR; INTRAVENOUS at 13:31

## 2024-05-29 RX ADMIN — PANTOPRAZOLE SODIUM 40 MG: 40 INJECTION, POWDER, FOR SOLUTION INTRAVENOUS at 14:44

## 2024-05-29 RX ADMIN — MEROPENEM 1000 MG: 1 INJECTION, POWDER, FOR SOLUTION INTRAVENOUS at 11:58

## 2024-05-29 RX ADMIN — MORPHINE SULFATE 4 MG: 4 INJECTION, SOLUTION INTRAMUSCULAR; INTRAVENOUS at 14:51

## 2024-05-29 RX ADMIN — IOPAMIDOL 80 ML: 755 INJECTION, SOLUTION INTRAVENOUS at 05:50

## 2024-05-29 RX ADMIN — WATER 125 MG: 1 INJECTION INTRAMUSCULAR; INTRAVENOUS; SUBCUTANEOUS at 05:59

## 2024-05-29 NOTE — ED NOTES
Radiology Procedure Waiver   Name: María Macias  : 1945  MRN: [id number]    Date:  24    Time: 4:59 AM EDT    Benefits of immediately proceeding with radiology exam(s) without pre-testing outweigh the risks or are not indicated as specified below and therefore the following is/are being waived:    [x] Benefits of immediate radiology exam(s) outweigh any risk.                                               OR    Pre-exam testing is not indicated for the following reason(s):  [] Pregnancy test   [] Patients LMP on-time and regular.   [] Patient had Tubal Ligation or has other Contraception Device.   [] Patient  is Menopausal or Premenarcheal.    [] Patient had Full or Partial Hysterectomy.    [] Protocol for CT contrast allegry   [] Patient has tolerated well previously   [] Patient does not have a true allergy    [] MRI Questionnaire     [] BUN/Creatinine   [] Patient age w/no hx of renal dysfunction.   [] Patient on Dialysis.   [] Recent Normal Labs.  Electronically signed by Fantasma Quintana DO on 24 at 4:59 AM EDT

## 2024-05-29 NOTE — CONSULTS
History Of Present Illness: HPI: 78-year-old female present emerged part complaints of altered mentation.  Patient was reported to be found unresponsive at home by daughter around 4 AM.  Last seen around 8 PM prior to sleep.  Patient is on blood thinners.  Daughter went to check on patient as her diabetic alarm was going off.  Patient on initial exam will arouse to stimulation however no attempted verbal response.  Moving all extremities.No family at bedside initial exam.  EMS reports sugar greater than 100.   As above per ed staff.    The patient is a 78 y.o. female with significant past medical history of see below who presents with above.      The patient has the following symptoms:    Change in level of consciousness: unresponsive    New Weakness: yes    Numbness or Tingling: no    Difficulty Swallowing: yes    Current Medications:   Scheduled Meds:   sodium chloride flush         Continuous Infusions:  PRN Meds:sodium chloride flush    Allergies:  Doxycycline calcium, Sulfa antibiotics, Iodine, and Pcn [penicillins]    Social History:   TOBACCO:   reports that she has quit smoking. She started smoking about 50 years ago. She has never used smokeless tobacco.  ETOH:   reports current alcohol use.    Past Medical History:        Diagnosis Date    Atrial fibrillation (HCC)     CAD (coronary artery disease)     CHF (congestive heart failure) ([facility]) 2004    after heart attack    Chronic back pain     GERD (gastroesophageal reflux disease)     Hx of blood clots 1997    thrombophlebitis leg    Hyperlipidemia     Hypertension     Irritable bowel syndrome     Myocardial infarction (HCC) 2004    Neuropathy     Obesity     Osteoarthritis     Renal insufficiency     chronic see's dr orosco    Restless legs syndrome     Type II or unspecified type diabetes mellitus without mention of complication, not stated as uncontrolled        Past Surgical History:        Procedure Laterality Date    CARDIAC PROCEDURE N/A 11/16/2023

## 2024-05-29 NOTE — PROGRESS NOTES
This patient is on medication that requires renal, weight, and/or indication dose adjustment.      Date Body Weight IBW  Adjusted BW SCr  CrCl Dialysis status   5/29/2024 91.6 kg (201 lb 15.1 oz) Ideal body weight: 54.7 kg (120 lb 9.5 oz)  Adjusted ideal body weight: 69.5 kg (153 lb 2.1 oz) Serum creatinine: 1.4 mg/dL (H) 05/29/24 0500  Estimated creatinine clearance: 38 mL/min (A) N/a       Pharmacy has dose-adjusted the following medication(s):    Date Previous Order Adjusted Order   5/29/2024 Acyclovir 900 mg once Acyclovir 700 mg once     These changes were made per protocol according to the [facility]   Automatic Renal Dose Adjustment Policy.     *Please note this dose may need readjusted if patient's condition changes.  BMI ? 30: Use adjusted body weight for dosing weight    Please contact pharmacy with any questions regarding these changes.    Selina Carmichael, PharmD.  5/29/2024 12:36 PM    SJW: [phone]

## 2024-05-29 NOTE — ED NOTES
Dr Mason here to see the pt. He spoke with Dr Montgomery and now she will call consult interventional radiology.

## 2024-05-29 NOTE — ED NOTES
Petechial rash noted to face and right arm. The daughter says last known well was 2200 last night. Matter found the pt on the floor laying on her right side unresponsive. Daughter heard the pt blood sugar monitor alarming is how she found her. Left eyelid puffy. The pt opens eyes when calling her name. She squeezes with the left hand to request, not the right. She has non purposeful movement to both legs .

## 2024-05-29 NOTE — CONSULTS
CARDIOLOGY ATTENDING ATTESTATION   I spent > 51% of the total time involved with completing the encounter. The total time included the following:  Independently interviewing the patient (HPI, ROS, PMH, PSH, FMH, SH, allergies, and medications)  Independently performing a medically appropriate examination  Reviewing the above documentation completed by the KARMEN  Ordering medications, tests, and/or procedures  Formulating the assessment/plan and reviewing the rationale for the above recommendations  Reviewing available records, results of all previously ordered testing/procedures, and current problem list  Counseling/educating the patient  Coordinating care with other healthcare professionals  Communicating results to the patient's family/caregiver  Documenting clinical information in the patient's electronic health record    Physical Exam   BP (!) 163/87   Pulse 82   Temp 97.5 °F (36.4 °C) (Bladder)   Resp 16   Wt 91.6 kg (201 lb 15.1 oz)   SpO2 100%   BMI 34.66 kg/m²   Constitutional: No distress.    Head: Normocephalic and atraumatic.   Eyes: EOM are normal. Pupils are equal, round, and reactive to light.   Neck: Normal range of motion. Neck supple. No hepatojugular reflux and no JVD present. Carotid bruit is not present. No tracheal deviation present. No thyromegaly present.   Cardiovascular: Normal rate, regular rhythm, normal heart sounds and intact distal pulses.  Exam reveals no gallop and no friction rub.  No murmur heard.  Pulmonary/Chest: Effort normal and breath sounds normal. No respiratory distress. No wheezes. No rales. No tenderness.   Abdominal: Soft. Bowel sounds are normal. No distension and no mass. No tenderness. No rebound and no guarding.   Musculoskeletal: Normal range of motion. No edema and no tenderness.   Lymphadenopathy:   No cervical adenopathy.   Skin: Skin is warm and dry. No rash noted. Not diaphoretic. No erythema.     TTE 5/2/2024 Dr Velázquez:    Image quality is suboptimal.  stenosis just distal to the stent. Mid diffuse 40% stenosis.      Right Posterior Descending Artery   Ostial 100% chronic total occlusion with poor left to right and poor right to right collaterals.      First Right Posterolateral Branch   Mid diffuse 50% stenosis.      Conclusions:  PDA: Ostial chronic total occlusion with poor collaterals.  Mid LAD: 80% stenosis.  Successful stenting with a 2.75 x 15 mm drug-eluting stent.  Excellent results.     See accompanying documentation for full consult.    ASSESSMENT AND PLAN:  Patient Active Problem List   Diagnosis    Right cataract    Left cataract    Acute pulmonary embolism (HCC)    Unsteadiness on feet    Stage 3b chronic kidney disease (HCC)    Sciatica of right side    Proteinuria    Post-laminectomy syndrome    Obesity    Mixed hyperlipidemia    Lumbosacral radiculopathy    Iron deficiency anemia    Hypomagnesemia    Hyperuricemia    Hypertension    Hyperkalemia    Type 2 diabetes mellitus with chronic kidney disease ([facility])    Disorder of skin    Chronic kidney disease    Benign hypertensive kidney disease with chronic kidney disease    Anemia in chronic kidney disease    Acute kidney injury (HCC)    NSTEMI (non-ST elevated myocardial infarction) ([facility])    Coronary artery disease involving native coronary artery of native heart with angina pectoris ([facility])    Paroxysmal atrial fibrillation ([facility])    Acute systolic (congestive) heart failure ([facility])    Ischemic cardiomyopathy    Nonrheumatic mitral valve regurgitation    Nonrheumatic mitral valve stenosis    Chronic anticoagulation     1. NSTEMI/Elevated troponin/CAD:     Chart/labs/EKG/monitor reviewed.     CTA no PE.      Patient reports having MI in 2004 and having a \"clot removed from RCA\". She had one stent to the RCA and is unsure of where the other is located.     Echo 11/13/2023 with severe LV dysfunction, RV okay. Follow up echo 5/2/2024 with recovery of LVEF.      Cath 11/16/2023 revealed significant LAD disease

## 2024-05-29 NOTE — PROGRESS NOTES
Chief Complaint   Patient presents with    Altered Mental Status        Patient signed out to me by: Dr. Quintana    Interval History: Patient is a 78-year-old female from home presenting for altered mental status.  Patient typically alert and oriented x 4.  Last known well at 2000 on 5/28.  Found down at 0400.  Initial imaging largely unrevealing for acute pathology.  Initial blood work suggest possible infection with no clear etiology.  There is concern for meningitis in this patient.        CT CSpine W/O Contrast   Final Result   No acute osseous abnormality of the cervical spine.  Mild/moderate   degenerative changes without significant central stenosis.         CT Head W/O Contrast   Final Result   No acute intracranial abnormality.         CTA PULMONARY W CONTRAST   Final Result   No evidence of pulmonary embolism or acute pulmonary abnormality.         CT ABDOMEN PELVIS W IV CONTRAST Additional Contrast? None   Final Result   1. No acute intra-abdominal or pelvic abnormality.   2. Marked distention of the urinary bladder suggesting nonspecific urinary   retention process.   3. Cholelithiasis.   4. Diverticulosis.         IR INTERVENTIONAL RADIOLOGY PROCEDURE REQUEST    (Results Pending)       Labs Reviewed   CBC WITH AUTO DIFFERENTIAL - Abnormal; Notable for the following components:       Result Value    WBC 16.1 (*)     Neutrophils % 83 (*)     Lymphocytes % 9 (*)     Neutrophils Absolute 13.35 (*)     Lymphocytes Absolute 1.43 (*)     All other components within normal limits   COMPREHENSIVE METABOLIC PANEL - Abnormal; Notable for the following components:    Glucose 336 (*)     BUN 50 (*)     Creatinine 1.4 (*)     Est, Glom Filt Rate 38 (*)     All other components within normal limits   URINE DRUG SCREEN - Abnormal; Notable for the following components:    Barbiturate Screen, Ur POSITIVE (*)     All other components within normal limits   SERUM DRUG SCREEN - Abnormal; Notable for the following components:

## 2024-05-29 NOTE — PROGRESS NOTES
Chief Complaint   Patient presents with    Altered Mental Status        Patient signed out to me by: Dr. Quintana    Interval History: Patient is a 78-year-old female from home presenting for altered mental status.  Patient typically alert and oriented x 4.  Last known well at 2000 on 5/28.  Found down at 0400.  Initial imaging largely unrevealing for acute pathology.  Initial blood work suggest possible infection with no clear etiology.  There is concern for meningitis in this patient.        CT CSpine W/O Contrast   Final Result   No acute osseous abnormality of the cervical spine.  Mild/moderate   degenerative changes without significant central stenosis.         CT Head W/O Contrast   Final Result   No acute intracranial abnormality.         CTA PULMONARY W CONTRAST   Final Result   No evidence of pulmonary embolism or acute pulmonary abnormality.         CT ABDOMEN PELVIS W IV CONTRAST Additional Contrast? None   Final Result   1. No acute intra-abdominal or pelvic abnormality.   2. Marked distention of the urinary bladder suggesting nonspecific urinary   retention process.   3. Cholelithiasis.   4. Diverticulosis.         IR INTERVENTIONAL RADIOLOGY PROCEDURE REQUEST    (Results Pending)       Labs Reviewed   CBC WITH AUTO DIFFERENTIAL - Abnormal; Notable for the following components:       Result Value    WBC 16.1 (*)     Neutrophils % 83 (*)     Lymphocytes % 9 (*)     Neutrophils Absolute 13.35 (*)     Lymphocytes Absolute 1.43 (*)     All other components within normal limits   COMPREHENSIVE METABOLIC PANEL - Abnormal; Notable for the following components:    Glucose 336 (*)     BUN 50 (*)     Creatinine 1.4 (*)     Est, Glom Filt Rate 38 (*)     All other components within normal limits   URINE DRUG SCREEN - Abnormal; Notable for the following components:    Barbiturate Screen, Ur POSITIVE (*)     All other components within normal limits   SERUM DRUG SCREEN - Abnormal; Notable for the following components:  examination, multiple bedside re-evaluations, IV medications, cardiac monitoring, continuous pulse oximetry, and complex medical decision making and emergency management    This patient has deteriorated during their ED course.    Please note that the withdrawal or failure to initiate urgent interventions for this patient would likely result in a life threatening deterioration or permanent disability.      Accordingly this patient received 50 minutes of critical care time, excluding separately billable procedures. Systems at risk for deterioration include: neurology.      Clinical Impression  1. Cerebrovascular accident (CVA), unspecified mechanism (HCC)    2. Altered mental status, unspecified altered mental status type    3. Accidental poisoning by barbiturates, initial encounter    4. NSTEMI (non-ST elevated myocardial infarction) ([facility])    5. DNR (do not resuscitate) discussion    6. Acute CVA (cerebrovascular accident) ([facility])          Disposition  Patient's disposition: Admit to med/surg floor  Patient's condition is critical.

## 2024-05-29 NOTE — H&P
abnormality. CTA HEAD: ANTERIOR CIRCULATION: On the right, there is 50-70% stenosis of the laceration and cavernous segments of the right internal carotid artery.  This provides satisfactory flow to the right MCA.  There is no sign of flow to the right ZAID from the right internal carotid artery. On the left, there is 70-90% narrowing of the petrous segment of the left internal carotid artery extending through the lacerum and clinoid segments. The supraclinoid segment is normal in caliber and has partial occlusion by thrombus. There is partial occlusion of the A1 segment of the left ZAID by thrombus, with short complete occlusion of the A2 segment of the left ZAID beyond the patent anterior communicating artery.  The right ZAID is supplied by the left ICA via the anterior communicating artery. The M1 segment of the left MCA is partially occluded by thrombus.  There is some preservation of flow in the inferior division branches of the left MCA, but the superior division branches are occluded. POSTERIOR CIRCULATION: There is mild calcified plaque of the V4 segments of both codominant vertebral arteries at the level of the foramen magnum resulting in 50-70% stenosis bilaterally with decreased caliber of both vertebral arteries beyond the stenosis. No significant stenosis of the basilar, or posterior cerebral arteries. No aneurysm. I cannot identify either of the posterior communicating arteries, a variant of normal. OTHER: No dural venous sinus thrombosis on this non-dedicated study. CT PERFUSION: EXAM QUALITY: The CT perfusion examination is not diagnostic.  The venous output function curve is unsatisfactory and there is moderate patient motion. There is prominently I discussed the findings with Dr. Fuentes at 4:09 p.m. on 05/29/2024.     1. CT of the head shows new extensive acute nonhemorrhagic infarction throughout the left ZAID and MCA territory, sparing the inferior division of the left MCA and the left PCA. 2.  High-density thrombus in the supraclinoid left ICA, extending through the M1 and A1 segments of the left MCA and ZAID, respectively. 3. Increased swelling of the left cerebral hemisphere with moderate effacement of the left lateral ventricle, but without midline shift. The basal cisterns are widely patent without descending transtentorial herniation, but there is minimal shift of the midbrain towards the right. 4. No sign of any acute infarction in the right cerebral hemisphere. 5. CTA of the neck shows 30% stenosis of the origin of the right internal carotid artery from calcified and noncalcified plaque using NASCET criteria. 6. CTA of the head shows 70-90% narrowing of the petrous segment of the left internal carotid artery extending through the lacerum and clinoid segments. The supraclinoid segment is normal in caliber and has partial occlusion by thrombus. 7. Partial occlusion of the A1 segment of the left ZAID by thrombus, with short complete occlusion of the A2 segment of the left ZAID beyond the patent anterior communicating artery. 8. The M1 segment of the left MCA is partially occluded by thrombus. There is some preservation of flow in the inferior division branches of the left MCA, but the superior division branches are occluded. 9. 50-70% stenosis of the V4 segments of both codominant vertebral arteries at the level of the foramen magnum with decreased caliber of both vertebral arteries beyond the stenosis. 10. Moderate patchy confluent ground-glass infiltrates throughout both upper lobes, consistent with an atypical pneumonia, consider COVID-19. 11. Small bilateral pleural effusions. 12. Nondiagnostic CT perfusion exam.     CTA PULMONARY W CONTRAST    Result Date: 5/29/2024  EXAMINATION: CTA OF THE CHEST 5/29/2024 5:41 am TECHNIQUE: CTA of the chest was performed after the administration of intravenous contrast.  Multiplanar reformatted images are provided for review.  MIP images are provided for review.

## 2024-05-29 NOTE — CONSULTS
[facility]   Division of Pulmonary, Critical Care and Sleep Medicine  H&P Note    Nirmal Mason MD, MS    Patient: María Macias  MRN: [id number]  : 1945    Encounter Time: 1:49 PM     Date of Admission: 2024  4:51 AM    Primary Care Physician: Ambar Jarquin MD    Reason for ADMISSION: Altered mental status     HISTORY OF PRESENT ILLNESS : María Macias 78 y.o. female was seen in consultation regarding the above chief compliant.    Patient with CAD s/p LAD stent ( 2023), CIERRA PE (2023) on Coumadin, HFpEF, diabetes mellitus, hypertension, hyperlipidemia, initially admitted this morning with altered mental status.      Patient lives with her daughter and per daughter she was in her usual state of affairs yesterday.    Daughter by bedside states that her last known well time was around 8 PM yesterday.  This morning around 3 PM daughter heard patient's diabetes alarm go off and found her on the floor.    Patient was seen and examined at 1:00 PM.  She has dense right hemiplegia. Getting stat CTA head and neck, CT perfusion and MRI brain.    Discussed with ED Dr. Montgomery.  Recommended stroke alert activation, consultation to neurology, interventional radiology.    PAST MEDICAL HISTORY:  has a past medical history of Atrial fibrillation (HCC), CAD (coronary artery disease), CHF (congestive heart failure) (HCC), Chronic back pain, GERD (gastroesophageal reflux disease), Hx of blood clots, Hyperlipidemia, Hypertension, Irritable bowel syndrome, Myocardial infarction (HCC), Neuropathy, Obesity, Osteoarthritis, Renal insufficiency, Restless legs syndrome, and Type II or unspecified type diabetes mellitus without mention of complication, not stated as uncontrolled.    SURGICAL HISTORY:  has a past surgical history that includes Colonoscopy; Upper gastrointestinal endoscopy; parathyroidectomy; Carpal tunnel release (Bilateral); Coronary angioplasty with stent (); Spine  no evidence of hydrocephalus. Mild atrophy and periventricular white matter degenerative change. ORBITS: The visualized portion of the orbits demonstrate no acute abnormality. SINUSES: The visualized paranasal sinuses and mastoid air cells demonstrate no acute abnormality. SOFT TISSUES/SKULL:  No acute abnormality of the visualized skull or soft tissues.     No acute intracranial abnormality.     Echo (TTE) Limited    Result Date: 5/2/2024    Image quality is suboptimal. Contrast used: Definity.   Left Ventricle: Normal left ventricular systolic function with a visually estimated EF of 55 - 60%. Left ventricle size is normal. Moderately increased wall thickness. Normal wall motion.   Right Ventricle: Right ventricle size is normal. Normal systolic function.   Left Atrium: Left atrial volume index is normal (16-34 mL/m2).   Mitral Valve: Severe annular calcification of the mitral valve. Trace regurgitation. No stenosis noted.   Tricuspid Valve: Trace regurgitation. The estimated RVSP is 26 mmHg.   Pericardium: No pericardial effusion.       CBC with Differential:    Lab Results   Component Value Date/Time    WBC 16.1 05/29/2024 05:00 AM    RBC 3.82 05/29/2024 05:00 AM    HGB 11.6 05/29/2024 05:00 AM    HCT 34.2 05/29/2024 05:00 AM     11/21/2023 04:36 AM    MCV 89.5 05/29/2024 05:00 AM    MCH 30.4 05/29/2024 05:00 AM    MCHC 33.9 05/29/2024 05:00 AM    RDW 13.2 05/29/2024 05:00 AM    NRBC 1 11/19/2023 07:19 AM    METASPCT 1 11/19/2023 07:19 AM    LYMPHOPCT 9 05/29/2024 05:00 AM    PROMYELOPCT 1 11/20/2023 05:43 AM    MONOPCT 5 05/29/2024 05:00 AM    MYELOPCT 4 11/21/2023 04:36 AM    EOSPCT 1 05/29/2024 05:00 AM    BASOPCT 1 05/29/2024 05:00 AM    MONOSABS 0.79 05/29/2024 05:00 AM    LYMPHSABS 2.45 10/15/2013 11:05 AM    EOSABS 0.17 05/29/2024 05:00 AM    BASOSABS 0.12 05/29/2024 05:00 AM     CMP:    Lab Results   Component Value Date/Time     05/29/2024 05:00 AM    K 4.6 05/29/2024 05:00 AM

## 2024-05-29 NOTE — PROGRESS NOTES
[facility] - [place] Encounter    Name: María Macias                  Referral: Routine Visit    Sacraments  Anointed (Last Rites): Yes  Apostolic [place]: Yes  Confession: No  Communion: No     Assessment:  Patient was unresponsive but daughter was receptive to 's visit.      Intervention:   provided spiritual support and sacramental ministry for patient.     Outcome:  Patient's daughter expressed gratitude for 's visit.    Plan:  Chaplains will remain available to offer spiritual and emotional support as needed.      Electronically signed by Chaplain Lucy, on 5/29/2024 at 3:06 PM.  Spiritual Care Department  [facility]  [phone]

## 2024-05-29 NOTE — CONSULTS
Provider, MD Ary   Cobalamine Combinations (VITAMIN B12-FOLIC ACID PO) Take 1 capsule by mouth daily.    ProviderAry MD   Cholecalciferol (VITAMIN D) 2000 UNITS CAPS capsule Take 1 capsule by mouth daily    ProviderAry MD       Current Medications:    No current facility-administered medications for this encounter.    Allergies:  Doxycycline calcium, Sulfa antibiotics, Iodine, and Pcn [penicillins]    Social History:    Tobacco use /vaping:prior quit 1973   Alcohol:no history of  Marijuana:no history of  Illicit:no history of    Full code  Lives with daughter  No assistive device or home O2      Family History: noncontributory secondary to age      REVIEW OF SYSTEMS:     Unable See HPI     PHYSICAL EXAM:   BP (!) 163/87   Pulse 82   Temp 97.5 °F (36.4 °C) (Bladder)   Resp 16   SpO2 100%   CONST:  Well developed, well nourished obese white female, who appears stated age. Awakens to verbal stimuli does not verbalize  on 15 L NRB   HEENT:   Head- Normocephalic, atraumatic / Face with scattered petechiae noted   Eyes- Conjunctivae pink  Throat- Oral mucosa pink and moist  Neck-  No stridor, trachea midline, no jugular venous distention. No hepatojugular reflex  CHEST: Chest symmetrical and no grimace to palpation. No accessory muscle use or intercostal retractions  RESPIRATORY: Lung sounds - faint wheeze / diminished throughout fields   CARDIOVASCULAR:     No carotid bruit  Heart Inspection- shows no noted pulsations  Heart Palpation- no heaves or thrills  Heart Ausculation- Regular rate and rhythm, no murmur. No s3, s4 or rub   PV: L foot  chronic trace pitting edema (per daughter chronic).  Pedal pulses palpable R arm with Petechiae noted   ABDOMEN: Soft, obese, no grimace to light palpation. Bowel sounds present.   MS: Good  tone. No atrophy or abnormal movements. / Patient unable to move R side.  Able to move L   : Deferred  SKIN: Warm and dry    NEURO / PSYCH: does not verbalize.   11/13/2023 with severe LV dysfunction, RV okay. Follow up echo 5/2/2024 with recovery of LVEF.      Cath 11/16/2023 revealed significant LAD disease that received a NIHARIKA x1.  of PDA noted, which will be medically managed.    Medical management.     Typically on plavix/statin/BB/nitrate. Stop ASA due to warfarin.  Restart medications as able.      2. Chronic systolic CHF with recovery:     Echo 11/13/2023 with severe LV dysfunction, RV okay.      LVEF 5/2/2024 with LVEF 55/60%.       Typically on BB/nitrates/entresto/aldactone/PO bumex, restart as able.       3. PAF:    In sinus.       Monitor.     Typically on warfarin. IV heparin started.    4. PFO: Suggested by 11/13/2023 echo.    Typically on warfarin.    5. Neurological symptoms/Change of MS:    CT head no contrast okay.     Consider neurology evaluation.      For possible LP.     6. Hypoxia/SOB/Wheezing: Per primary service.     7. Leukocytosis: Per primary service.     8. Hx of PE: Typically on warfarin.     9. CKD: Follow labs.      10. Anemia: Follow labs.      11. HTN: Observe.      12. Lipids: Statin.      13. DM: Observe.     14. DJD: Back surgery 11/9/2023.     15. GERD     16. Obesity     Available external charts reviewed.   Available imaging and evaluations independently reviewed.   Interviewed and discussed patient with available family.  Discussed case with referring service and non-cardiology consultants.     Karel Velázquez D.O.  Cardiologist  Cardiology, [facility]

## 2024-05-29 NOTE — ED PROVIDER NOTES
Acetaminophen Level <5 (L) 10.0 - 30.0 ug/mL    Ethanol Lvl <10 <10 mg/dL    Salicylate Lvl <0.3 0.0 - 30.0 mg/dL    Toxic Tricyclic Sc,Blood PENDING NEGATIVE   Troponin   Result Value Ref Range    Troponin, High Sensitivity 111 (H) 0 - 9 ng/L   Brain Natriuretic Peptide   Result Value Ref Range    Pro- (H) 0 - 450 pg/mL   Urinalysis with Microscopic   Result Value Ref Range    Color, UA Yellow Yellow    Turbidity UA Clear Clear    Glucose, Ur >=1000 (A) NEGATIVE mg/dL    Bilirubin, Urine NEGATIVE NEGATIVE    Ketones, Urine NEGATIVE NEGATIVE mg/dL    Specific Gravity, UA 1.010 1.005 - 1.030    Urine Hgb NEGATIVE NEGATIVE    pH, Urine 6.0 5.0 - 9.0    Protein, UA NEGATIVE NEGATIVE mg/dL    Urobilinogen, Urine 0.2 0.0 - 1.0 EU/dL    Nitrite, Urine NEGATIVE NEGATIVE    Leukocyte Esterase, Urine NEGATIVE NEGATIVE    WBC, UA 0 TO 5 0 TO 5 /HPF    RBC, UA 0 TO 2 0 TO 2 /HPF    Epithelial Cells, UA 0 TO 2 /HPF   Beta-Hydroxybutyrate   Result Value Ref Range    Beta-Hydroxybutyrate 0.15 0.02 - 0.27 mmol/L   PH, VENOUS   Result Value Ref Range    pH, Genaro 7.291 (L) 7.350 - 7.450   TSH   Result Value Ref Range    TSH 0.71 0.27 - 4.20 uIU/mL   T4   Result Value Ref Range    T4, Total 7.4 4.5 - 11.7 ug/dL   Protime-INR   Result Value Ref Range    Protime 24.0 (H) 9.3 - 12.4 sec    INR 2.1    Troponin   Result Value Ref Range    Troponin, High Sensitivity 129 (H) 0 - 9 ng/L   POCT Glucose   Result Value Ref Range    POC Glucose 335 (H) 74 - 99 mg/dL   POCT blood gases   Result Value Ref Range    POC pH 7.355 7.350 - 7.450    POC pCO2 43.9 35.0 - 45.0 mm Hg    POC PO2 58.6 (L) 60.0 - 80.0 mm Hg    POC HCO3 24.5 22.0 - 26.0 mmol/L    Negative Base Excess, Art 1.2 mmol/L    POC O2 SAT 88.6 (L) 92.0 - 98.5 %    Adam Test POSITIVE     Sample Site Right Radial Artery     FIO2 21     Pt Temp 37.0     POC pH Temp 7.355     POC pCO2 Temp 43.9 mm Hg    POC pO2 Temp 58.6 mm Hg   EKG 12 Lead   Result Value Ref Range    Ventricular  Rate 72 BPM    Atrial Rate 72 BPM    P-R Interval 170 ms    QRS Duration 102 ms    Q-T Interval 426 ms    QTc Calculation (Bazett) 466 ms    P Axis 56 degrees    R Axis -14 degrees    T Axis 63 degrees       Radiology thus far  CT CSpine W/O Contrast   Final Result   No acute osseous abnormality of the cervical spine.  Mild/moderate   degenerative changes without significant central stenosis.         CT Head W/O Contrast   Final Result   No acute intracranial abnormality.         CTA PULMONARY W CONTRAST   Final Result   No evidence of pulmonary embolism or acute pulmonary abnormality.         CT ABDOMEN PELVIS W IV CONTRAST Additional Contrast? None   Final Result   1. No acute intra-abdominal or pelvic abnormality.   2. Marked distention of the urinary bladder suggesting nonspecific urinary   retention process.   3. Cholelithiasis.   4. Diverticulosis.                 ------------------------- NURSING NOTES AND VITALS REVIEWED ---------------------------  Date / Time Roomed:  5/29/2024  4:51 AM  ED Bed Assignment:  03/03    The nursing notes thus far within the ED encounter and vital signs as below have been reviewed.   Patient Vitals for the past 24 hrs:   BP Temp Temp src Pulse Resp SpO2   05/29/24 0702 -- -- -- 83 25 100 %   05/29/24 0630 (!) 180/126 -- -- 90 28 (!) 86 %   05/29/24 0523 (!) 166/74 97.7 °F (36.5 °C) Axillary 78 19 90 %       Oxygen Saturation Interpretation: Improved after treatment      ------------------------------------------ PROGRESS NOTES ------------------------------------------  Re-evaluation(s):  Patient’s symptoms show no change  Repeat physical examination is not changed    Patient’s symptoms on further revaluation: show no change  Repeat physical examination on further revaluation not changed    Staff have spoken with the  daughter  and discussed today’s results thus far, in addition to providing specific details for the plan of care and counseling regarding the diagnosis and

## 2024-05-30 ENCOUNTER — HOSPITAL ENCOUNTER (OUTPATIENT)
Dept: CARDIAC REHAB | Age: 79
Setting detail: THERAPIES SERIES
End: 2024-05-30
Payer: MEDICARE

## 2024-05-30 VITALS
SYSTOLIC BLOOD PRESSURE: 130 MMHG | OXYGEN SATURATION: 93 % | DIASTOLIC BLOOD PRESSURE: 61 MMHG | RESPIRATION RATE: 18 BRPM | HEART RATE: 74 BPM | WEIGHT: 202.1 LBS | BODY MASS INDEX: 34.5 KG/M2 | HEIGHT: 64 IN | TEMPERATURE: 96.9 F

## 2024-05-30 LAB
MICROORGANISM SPEC CULT: NO GROWTH
SPECIMEN DESCRIPTION: NORMAL

## 2024-05-30 PROCEDURE — 99233 SBSQ HOSP IP/OBS HIGH 50: CPT | Performed by: INTERNAL MEDICINE

## 2024-05-30 PROCEDURE — 1200000000 HC SEMI PRIVATE

## 2024-05-30 PROCEDURE — 6360000002 HC RX W HCPCS: Performed by: STUDENT IN AN ORGANIZED HEALTH CARE EDUCATION/TRAINING PROGRAM

## 2024-05-30 PROCEDURE — 99231 SBSQ HOSP IP/OBS SF/LOW 25: CPT | Performed by: INTERNAL MEDICINE

## 2024-05-30 RX ORDER — MORPHINE SULFATE 20 MG/ML
5 SOLUTION ORAL
Status: DISCONTINUED | OUTPATIENT
Start: 2024-05-30 | End: 2024-05-31 | Stop reason: HOSPADM

## 2024-05-30 RX ORDER — GLYCOPYRROLATE 0.2 MG/ML
0.2 INJECTION INTRAMUSCULAR; INTRAVENOUS EVERY 4 HOURS PRN
Status: DISCONTINUED | OUTPATIENT
Start: 2024-05-30 | End: 2024-05-31 | Stop reason: HOSPADM

## 2024-05-30 RX ORDER — LORAZEPAM 2 MG/ML
1 CONCENTRATE ORAL
Status: DISCONTINUED | OUTPATIENT
Start: 2024-05-30 | End: 2024-05-31 | Stop reason: HOSPADM

## 2024-05-30 RX ORDER — MORPHINE SULFATE 20 MG/ML
5 SOLUTION ORAL
Qty: 15 ML | Refills: 0 | Status: SHIPPED | OUTPATIENT
Start: 2024-05-30 | End: 2024-06-04

## 2024-05-30 RX ORDER — MORPHINE SULFATE 2 MG/ML
2 INJECTION, SOLUTION INTRAMUSCULAR; INTRAVENOUS
Status: DISCONTINUED | OUTPATIENT
Start: 2024-05-30 | End: 2024-05-31 | Stop reason: HOSPADM

## 2024-05-30 RX ORDER — LORAZEPAM 2 MG/ML
1 CONCENTRATE ORAL
Qty: 30 ML | Refills: 0 | Status: SHIPPED | OUTPATIENT
Start: 2024-05-30 | End: 2024-06-13

## 2024-05-30 RX ADMIN — MORPHINE SULFATE 2 MG: 2 INJECTION, SOLUTION INTRAMUSCULAR; INTRAVENOUS at 11:47

## 2024-05-30 RX ADMIN — MORPHINE SULFATE 2 MG: 2 INJECTION, SOLUTION INTRAMUSCULAR; INTRAVENOUS at 18:20

## 2024-05-30 RX ADMIN — MORPHINE SULFATE 2 MG: 2 INJECTION, SOLUTION INTRAMUSCULAR; INTRAVENOUS at 08:23

## 2024-05-30 RX ADMIN — MORPHINE SULFATE 2 MG: 2 INJECTION, SOLUTION INTRAMUSCULAR; INTRAVENOUS at 14:48

## 2024-05-30 ASSESSMENT — LIFESTYLE VARIABLES
HOW OFTEN DO YOU HAVE A DRINK CONTAINING ALCOHOL: NEVER
HOW MANY STANDARD DRINKS CONTAINING ALCOHOL DO YOU HAVE ON A TYPICAL DAY: PATIENT DOES NOT DRINK

## 2024-05-30 ASSESSMENT — PAIN SCALES - WONG BAKER: WONGBAKER_NUMERICALRESPONSE: NO HURT

## 2024-05-30 NOTE — ED NOTES
This RN put in call to Dr. Jarquin's answering service to clarify admission orders for bed. Will wait for updated orders.

## 2024-05-30 NOTE — PROGRESS NOTES
4 Eyes Skin Assessment     NAME:  María Macias  YOB: 1945  MEDICAL RECORD NUMBER:  [id number]    The patient is being assessed for  Admission    I agree that at least one RN has performed a thorough Head to Toe Skin Assessment on the patient. ALL assessment sites listed below have been assessed.      Areas assessed by both nurses:    Head, Face, Ears, Shoulders, Back, Chest, Arms, Elbows, Hands, Sacrum. Buttock, Coccyx, Ischium, and Legs. Feet and Heels        Does the Patient have a Wound? No noted wound(s)       Jayjay Prevention initiated by RN: Yes  Wound Care Orders initiated by RN: No    Pressure Injury (Stage 3,4, Unstageable, DTI, NWPT, and Complex wounds) if present, place Wound referral order by RN under : No    New Ostomies, if present place, Ostomy referral order under : No     Nurse 1 eSignature: Electronically signed by Tarah Estrada RN on 5/30/24 at 6:44 PM EDT    **SHARE this note so that the co-signing nurse can place an eSignature**    Nurse 2 eSignature: {Esignature:[id number]}

## 2024-05-30 NOTE — CONSULTS
[facility]   Division of Pulmonary, Critical Care and Sleep Medicine  H&P Note        Patient: María Macias  MRN: [id number]  : 1945    Encounter Time: 11:30 AM     Date of Admission: 2024  4:51 AM    Primary Care Physician: Ambar Jarquin MD    Reason for ADMISSION: Altered mental status     HISTORY OF PRESENT ILLNESS : María Macias 78 y.o. female was seen in consultation regarding the above chief compliant.    Patient with CAD s/p LAD stent ( 2023), CIERRA PE (2023) on Coumadin, HFpEF, diabetes mellitus, hypertension, hyperlipidemia, initially admitted this morning with altered mental status.      Patient lives with her daughter and per daughter she was in her usual state of affairs yesterday.    Daughter by bedside states that her last known well time was around 8 PM yesterday.  This morning around 3 PM daughter heard patient's diabetes alarm go off and found her on the floor.    Patient was seen and examined at 1:00 PM.  She has dense right hemiplegia. Getting stat CTA head and neck, CT perfusion and MRI brain.    Daily progress:  May 30, 2024: Patient is currently on comfort care medications.  She remains on nonrebreather facemask.  Oxygen supplement will be changed to high flow oxygen for comfort.  Plan of care was discussed with the daughter at the bedside.      PAST MEDICAL HISTORY:  has a past medical history of Atrial fibrillation (HCC), CAD (coronary artery disease), CHF (congestive heart failure) ([facility]), Chronic back pain, GERD (gastroesophageal reflux disease), Hx of blood clots, Hyperlipidemia, Hypertension, Irritable bowel syndrome, Myocardial infarction (HCC), Neuropathy, Obesity, Osteoarthritis, Renal insufficiency, Restless legs syndrome, and Type II or unspecified type diabetes mellitus without mention of complication, not stated as uncontrolled.    SURGICAL HISTORY:  has a past surgical history that includes Colonoscopy; Upper gastrointestinal endoscopy;  pending    CT PE: Negative for PE    INR 2.1, concern for acute stroke, recommend hold heparin GGT  Aspirin, statin when able  Management per cardiology    RESPIRATORY:    Data/Assessment:ABG, CXR / CT chest - Reviewed  Vent Settings: Reviewed    Acute hypoxic respiratory failure  High aspiration risk    Likely related to aspiration pneumonia.  No workup is indicated as patient is on comfort care now.  Possible CHF    Oxygen supplementation to maintain sats above 90%  Bronchodilators  Pulmonary hygiene    INFECTIOUS DISEASE:  Aspiration pneumonia.    RENAL:    CKD 3    Trend renal function and electrolytes, replete per protocol.   Trend urine output     GASTROINTESTINAL    Cholelithiasis    N.p.o.    ENDOCRINE:    Type 2 diabetes mellitus    Every 4 blood glucose checks  Medium dose insulin sliding scale  Target glucose range 140-180    HEME/ONC:    History of pulmonary embolism (Nov 2023)    INR 2.1  Hold anticoagulation pending neurointerventional evaluation    Transfuse PRBC for Hb < 7 gm/dL  Transfuse for Platelets < 10.    PT/OT: Early mobilization as appropriate    PPX: Protonix, patient on Coumadin with INR 2.1    Lines: PIV    Case was discussed with care team - discussed with cardiology and ED.    Patient will benefit from transfer to tertiary care center with 24 x 7 neurology coverage.    Family - updated daughter by bedside

## 2024-05-30 NOTE — CARE COORDINATION
CM note: Pts daughter has spoken with other family members and would now like for patient to remain here as long as able.  Message out to Dr. Jarquin to inform of new request as he originally stated patient could be discharged per daughter's wishes.        ADDENDUM;2:00 PM  Per Dr. Jarquin, plan for patient to discharge home tomorrow with [facility].  Informed Lisa at [facility] of plan and need for home oxygen.  Also informed patient's daughter.

## 2024-05-30 NOTE — CARE COORDINATION
CM note: Spoke with Lisa at [facility], they have accepted patient and will admit to hospice services when patient arrives home.  States daughter denies any DME needs.  Pt is currently on oxygen here so CM has asked for oxygen to be available in the home.  Message to Dr. Jarquin and will await his discharge order to arrange for transportation home.

## 2024-05-30 NOTE — PLAN OF CARE
Problem: Safety - Adult  Goal: Free from fall injury  Outcome: Progressing     Problem: Discharge Planning  Goal: Discharge to home or other facility with appropriate resources  Outcome: Progressing     Problem: ABCDS Injury Assessment  Goal: Absence of physical injury  Outcome: Progressing     Problem: Skin/Tissue Integrity  Goal: Absence of new skin breakdown  Description: 1.  Monitor for areas of redness and/or skin breakdown  2.  Assess vascular access sites hourly  3.  Every 4-6 hours minimum:  Change oxygen saturation probe site  4.  Every 4-6 hours:  If on nasal continuous positive airway pressure, respiratory therapy assess nares and determine need for appliance change or resting period.  Outcome: Progressing     Problem: Pain  Goal: Verbalizes/displays adequate comfort level or baseline comfort level  Outcome: Progressing     Problem: Chronic Conditions and Co-morbidities  Goal: Patient's chronic conditions and co-morbidity symptoms are monitored and maintained or improved  Outcome: Progressing     Problem: Dyspnea Due to End of Life  Goal: Demonstrate understanding of and ability to manage respiratory symptoms at end of life  Description: Patient  and or family/caregiver will verbalize recall of breathing strategies to maintain an effective breathing pattern during the inpatient hospice stay.        Outcome: Progressing     Problem: Fever Due to End of Life  Goal: Demonstrate understanding of and ability to manage fever at end of life  Description: Patient and/or family/caregiver will verbalize recall the ability to manage fever at end of life during the inpatient hospice stay.  Outcome: Progressing     Problem: Communication Deficit  Goal: Effectively communicate symptoms, needs, and concerns  Description: Patient  and/or family/caregiver will be able to communicate symptoms, needs, and concerns as evidenced by the use of language services during the inpatient hospice stay.    Outcome: Progressing

## 2024-05-30 NOTE — PROGRESS NOTES
[facility] Inpatient Progress Note    Patient is a 78 y.o. female of Ambar Jarquin MD seen in hospital follow up.     Chief complaint: CVA/Elevated troponin    HPI: No acute distress.    Patient Active Problem List   Diagnosis    Right cataract    Left cataract    Acute pulmonary embolism (HCC)    Unsteadiness on feet    Stage 3b chronic kidney disease ([facility])    Sciatica of right side    Proteinuria    Post-laminectomy syndrome    Obesity    Mixed hyperlipidemia    Lumbosacral radiculopathy    Iron deficiency anemia    Hypomagnesemia    Hyperuricemia    Hypertension    Hyperkalemia    Type 2 diabetes mellitus with chronic kidney disease ([facility])    Disorder of skin    Chronic kidney disease    Benign hypertensive kidney disease with chronic kidney disease    Anemia in chronic kidney disease    Acute kidney injury ([facility])    NSTEMI (non-ST elevated myocardial infarction) ([facility])    Coronary artery disease involving native coronary artery of native heart with angina pectoris ([facility])    Paroxysmal atrial fibrillation ([facility])    Acute systolic (congestive) heart failure ([facility])    Ischemic cardiomyopathy    Nonrheumatic mitral valve regurgitation    Nonrheumatic mitral valve stenosis    Chronic anticoagulation    Altered mental status    Acute CVA (cerebrovascular accident) ([facility])    CVA (cerebrovascular accident due to intracerebral hemorrhage) ([facility])       Allergies   Allergen Reactions    Doxycycline Calcium Anaphylaxis    Sulfa Antibiotics Anaphylaxis    Iodine Hives    Pcn [Penicillins] Rash       Current Facility-Administered Medications   Medication Dose Route Frequency Provider Last Rate Last Admin    morphine 20MG/ML concentrated solution 5 mg  5 mg Oral Q2H PRN Carlos Jarquin MD        glycopyrrolate (ROBINUL) injection 0.2 mg  0.2 mg IntraVENous Q4H PRN Carlos Jarquin MD        LORazepam (ATIVAN) 2 MG/ML concentrated solution 1 mg  1 mg Oral Q2H PRN Carlos Jarquin MD        morphine (PF) injection 2 mg  2  11/21/2023 04:36 AM     Magnesium:    Lab Results   Component Value Date/Time    MG 2.0 11/16/2023 03:15 AM     Cardiac Enzymes:   Lab Results   Component Value Date    CKTOTAL 282 (H) 05/29/2024    CKTOTAL 239 (H) 05/29/2024    TROPHS 512 (H) 05/29/2024    TROPHS 276 (H) 05/29/2024    TROPHS 129 (H) 05/29/2024      PT/INR:    Lab Results   Component Value Date/Time    PROTIME 24.0 05/29/2024 06:02 AM    INR 2.1 05/29/2024 06:02 AM     TSH:    Lab Results   Component Value Date/Time    TSH 0.71 05/29/2024 05:00 AM     TTE 5/2/2024 Dr Velázquez:    Image quality is suboptimal. Contrast used: Definity.    Left Ventricle: Normal left ventricular systolic function with a visually estimated EF of 55 - 60%. Left ventricle size is normal. Moderately increased wall thickness. Normal wall motion.    Right Ventricle: Right ventricle size is normal. Normal systolic function.    Left Atrium: Left atrial volume index is normal (16-34 mL/m2).    Mitral Valve: Severe annular calcification of the mitral valve. Trace regurgitation. No stenosis noted.    Tricuspid Valve: Trace regurgitation. The estimated RVSP is 26 mmHg.    Pericardium: No pericardial effusion.    Echo Summary 11/13/2023:Dr Velázquez - while patient had PE & Pneumonia     Left Ventricle: Severely reduced left ventricular systolic function. EF by 2D Simpsons Biplane is 30%. Left ventricle size is normal. Normal wall thickness. Severe global hypokinesis present.    Right Ventricle: Normal systolic function.    Mitral Valve: MV mean gradient is 7 mmHg. Moderate annular calcification of the mitral valve. Mild to moderate regurgitation. Mild to moderate stenosis noted. MV mean gradient is 7 mmHg.    Tricuspid Valve: Normal RVSP.    Interatrial Septum: Agitated saline study was positive without provocation. Right to left shunt was noted.    Pericardium: No pericardial effusion.    Contrast used: Definity.      Cath Summary 11/16/2023:Dr Thomason   Left Main   Mild diffuse

## 2024-05-30 NOTE — CARE COORDINATION
CM note: Met with patient's daughter, Deja, at the bedside and discussed Hospice consult.  Reviewed options of home with hospice, SNF with hospice and hospice house.  Daughter states that she would like to take the patient home with hospice if there was a hospice provider that could supply someone to sit with her mother during the day while she was at work as she's not sure how much time she is allowed to have off of work.  CM is not aware of any hospice providers that offer this service but will send out several messages to liaisons.  Second option would be to send patient to a facility if unable to get daily \"sitter\".  Messages out to [facility] and [facility], awaiting responses.          ADDENDUM:10:34 AM  Daughter has spoken to her work and has decided to take some time off.  She would like to get her mother home with hospice as soon as possible.  She has no preference of a provider and would like to use Dr. Jarquin's recommendation.  Referral called to Dr. Jarquin's preference, [facility] hospice.  Faxed facesheet and H&P.  Will await a return call from [facility].  CM available to assist with transportation arrangements once patient has been accepted by hospice and they are ready for her transfer home.

## 2024-05-31 LAB
ACB COMPLEX DNA BLD POS QL NAA+NON-PROBE: NOT DETECTED
B FRAGILIS DNA BLD POS QL NAA+NON-PROBE: NOT DETECTED
BIOFIRE TEST COMMENT: ABNORMAL
C ALBICANS DNA BLD POS QL NAA+NON-PROBE: NOT DETECTED
C AURIS DNA BLD POS QL NAA+NON-PROBE: NOT DETECTED
C GATTII+NEOFOR DNA BLD POS QL NAA+N-PRB: NOT DETECTED
C GLABRATA DNA BLD POS QL NAA+NON-PROBE: NOT DETECTED
C KRUSEI DNA BLD POS QL NAA+NON-PROBE: NOT DETECTED
C PARAP DNA BLD POS QL NAA+NON-PROBE: NOT DETECTED
C TROPICLS DNA BLD POS QL NAA+NON-PROBE: NOT DETECTED
E CLOAC COMP DNA BLD POS NAA+NON-PROBE: NOT DETECTED
E COLI DNA BLD POS QL NAA+NON-PROBE: NOT DETECTED
E FAECALIS DNA BLD POS QL NAA+NON-PROBE: NOT DETECTED
E FAECIUM DNA BLD POS QL NAA+NON-PROBE: NOT DETECTED
ENTEROBACTERALES DNA BLD POS NAA+N-PRB: NOT DETECTED
GP B STREP DNA BLD POS QL NAA+NON-PROBE: NOT DETECTED
HAEM INFLU DNA BLD POS QL NAA+NON-PROBE: NOT DETECTED
K OXYTOCA DNA BLD POS QL NAA+NON-PROBE: NOT DETECTED
KLEBSIELLA SP DNA BLD POS QL NAA+NON-PRB: NOT DETECTED
KLEBSIELLA SP DNA BLD POS QL NAA+NON-PRB: NOT DETECTED
L MONOCYTOG DNA BLD POS QL NAA+NON-PROBE: NOT DETECTED
MECA+MECC ISLT/SPM QL: NOT DETECTED
MICROORGANISM SPEC CULT: ABNORMAL
MICROORGANISM/AGENT SPEC: ABNORMAL
N MEN DNA BLD POS QL NAA+NON-PROBE: NOT DETECTED
P AERUGINOSA DNA BLD POS NAA+NON-PROBE: NOT DETECTED
PROTEUS SP DNA BLD POS QL NAA+NON-PROBE: NOT DETECTED
S AUREUS DNA BLD POS QL NAA+NON-PROBE: NOT DETECTED
S AUREUS+CONS DNA BLD POS NAA+NON-PROBE: DETECTED
S EPIDERMIDIS DNA BLD POS QL NAA+NON-PRB: DETECTED
S LUGDUNENSIS DNA BLD POS QL NAA+NON-PRB: NOT DETECTED
S MALTOPHILIA DNA BLD POS QL NAA+NON-PRB: NOT DETECTED
S MARCESCENS DNA BLD POS NAA+NON-PROBE: NOT DETECTED
S PNEUM DNA BLD POS QL NAA+NON-PROBE: NOT DETECTED
S PYO DNA BLD POS QL NAA+NON-PROBE: NOT DETECTED
SALMONELLA DNA BLD POS QL NAA+NON-PROBE: NOT DETECTED
SERVICE CMNT-IMP: ABNORMAL
SPECIMEN DESCRIPTION: ABNORMAL
STREPTOCOCCUS DNA BLD POS NAA+NON-PROBE: NOT DETECTED

## 2024-05-31 PROCEDURE — 2700000000 HC OXYGEN THERAPY PER DAY

## 2024-05-31 PROCEDURE — 6360000002 HC RX W HCPCS: Performed by: STUDENT IN AN ORGANIZED HEALTH CARE EDUCATION/TRAINING PROGRAM

## 2024-05-31 RX ADMIN — MORPHINE SULFATE 2 MG: 2 INJECTION, SOLUTION INTRAMUSCULAR; INTRAVENOUS at 10:45

## 2024-05-31 ASSESSMENT — PAIN SCALES - WONG BAKER: WONGBAKER_NUMERICALRESPONSE: NO HURT

## 2024-05-31 NOTE — CARE COORDINATION
CM note: transportation arranged with [facility] for 10 AM for patient to return home.  [facility] notified of arrangements, requested a call when patient leaves.  Spoke with daughter, Deja, in patient's room and she was updated.  Oxygen was delivered to the patient's home last evening.

## 2024-05-31 NOTE — PLAN OF CARE
Problem: Safety - Adult  Goal: Free from fall injury  5/31/2024 0229 by Lauren Uribe RN  Outcome: Not Progressing  5/30/2024 1334 by Tarah Estrada RN  Outcome: Progressing     Problem: Discharge Planning  Goal: Discharge to home or other facility with appropriate resources  5/31/2024 0229 by Lauren Uribe RN  Outcome: Not Progressing  5/30/2024 1334 by Tarah Estrada RN  Outcome: Progressing     Problem: ABCDS Injury Assessment  Goal: Absence of physical injury  5/31/2024 0229 by Lauren Uribe RN  Outcome: Not Progressing  5/30/2024 1334 by Tarah Estrada RN  Outcome: Progressing     Problem: Skin/Tissue Integrity  Goal: Absence of new skin breakdown  Description: 1.  Monitor for areas of redness and/or skin breakdown  2.  Assess vascular access sites hourly  3.  Every 4-6 hours minimum:  Change oxygen saturation probe site  4.  Every 4-6 hours:  If on nasal continuous positive airway pressure, respiratory therapy assess nares and determine need for appliance change or resting period.  5/31/2024 0229 by Lauren Uribe RN  Outcome: Not Progressing  5/30/2024 1334 by Tarah Estrada RN  Outcome: Progressing     Problem: Pain  Goal: Verbalizes/displays adequate comfort level or baseline comfort level  5/31/2024 0229 by Lauren Uribe RN  Outcome: Not Progressing  5/30/2024 1334 by Tarah Estrada RN  Outcome: Progressing     Problem: Chronic Conditions and Co-morbidities  Goal: Patient's chronic conditions and co-morbidity symptoms are monitored and maintained or improved  5/31/2024 0229 by Lauren Uribe RN  Outcome: Not Progressing  5/30/2024 1334 by Tarah Estrada RN  Outcome: Progressing     Problem: Dyspnea Due to End of Life  Goal: Demonstrate understanding of and ability to manage respiratory symptoms at end of life  Description: Patient  and or family/caregiver will verbalize recall of breathing strategies to maintain an effective breathing pattern during the  determine need for appliance change or resting period.  5/31/2024 0229 by Lauren Uribe RN  Outcome: Not Progressing  5/30/2024 1334 by Tarah Estrada RN  Outcome: Progressing     Problem: Pain  Goal: Verbalizes/displays adequate comfort level or baseline comfort level  5/31/2024 0229 by Lauren Uribe RN  Outcome: Not Progressing  5/30/2024 1334 by Tarha Estrada RN  Outcome: Progressing     Problem: Chronic Conditions and Co-morbidities  Goal: Patient's chronic conditions and co-morbidity symptoms are monitored and maintained or improved  5/31/2024 0229 by Lauren Uribe RN  Outcome: Not Progressing  5/30/2024 1334 by Tarah Estrada RN  Outcome: Progressing     Problem: Dyspnea Due to End of Life  Goal: Demonstrate understanding of and ability to manage respiratory symptoms at end of life  Description: Patient  and or family/caregiver will verbalize recall of breathing strategies to maintain an effective breathing pattern during the inpatient hospice stay.        5/31/2024 0229 by Lauren Uribe RN  Outcome: Not Progressing  5/30/2024 1334 by Tarah Estrada RN  Outcome: Progressing     Problem: Fever Due to End of Life  Goal: Demonstrate understanding of and ability to manage fever at end of life  Description: Patient and/or family/caregiver will verbalize recall the ability to manage fever at end of life during the inpatient hospice stay.  5/31/2024 0229 by Lauren Uribe RN  Outcome: Not Progressing  5/30/2024 1334 by Tarah Estrada RN  Outcome: Progressing     Problem: Communication Deficit  Goal: Effectively communicate symptoms, needs, and concerns  Description: Patient  and/or family/caregiver will be able to communicate symptoms, needs, and concerns as evidenced by the use of language services during the inpatient hospice stay.    5/31/2024 0229 by Lauren Uribe RN  Outcome: Not Progressing  5/30/2024 1334 by Tarah Estrada RN  Outcome: Progressing

## 2024-05-31 NOTE — PROGRESS NOTES
Subjective:  María was seen and examined at bedside today.   Patient remains comfortable on a morphine prn  Daughter at bedside has no additional questions or complaints    A complete review of systems and social history was completed on admission and remains unchanged unless otherwise noted    Scheduled Meds:  Continuous Infusions:  PRN Meds:morphine 20MG/ML, glycopyrrolate, LORazepam, morphine    Objective:  /61   Pulse 74   Temp 96.9 °F (36.1 °C) (Infrared)   Resp 18   Ht 1.626 m (5' 4\")   Wt 91.7 kg (202 lb 1.6 oz)   SpO2 93%   BMI 34.69 kg/m²   In: -   Out: 400    In: -   Out: 400 [Urine:400]     Comfortable on morphine prn with nasal cannula in place  Pupils remain sluggish  RRR, pos S1, S2  CTA bilaterally, no wheeze, rales or rhonchi  bowel sounds present, nontender, nondistended  No clubbing, cyanosis, or edema  No obvious rashes or lesions.    Recent Labs     05/29/24  0500   WBC 16.1*   HGB 11.6     Recent Labs     05/29/24  0500      K 4.6      CO2 24   BUN 50*   CREATININE 1.4*   GLUCOSE 336*     Recent Labs     05/29/24  0500   BILITOT 0.4   ALKPHOS 86   AST 19   ALT 13     Recent Labs     05/29/24  0602   INR 2.1     Recent Labs     05/29/24  0619 05/29/24  0815   CKTOTAL 239* 282*         CTA HEAD W CONTRAST    Result Date: 5/29/2024  EXAMINATION: CTA OF THE HEAD WITH CONTRAST WITH PERFUSION; CTA OF THE HEAD WITH CONTRAST; CTA OF THE NECK; CT OF THE HEAD WITHOUT CONTRAST 5/29/2024 12:55 pm: TECHNIQUE: CT of the head was performed without the administration of intravenous contrast. CTA of the head/brain was performed with the administration of intravenous contrast. Multiplanar reformatted images are provided for review.  MIP images are provided for review. CTA of the neck was performed with the administration of intravenous contrast. Multiplanar reformatted images are provided for review.  MIP images are provided for review. Stenosis of the internal carotid arteries  ORBITS: The visualized portion of the orbits demonstrate no acute abnormality. SINUSES: The visualized paranasal sinuses and mastoid air cells demonstrate no acute abnormality. SOFT TISSUES/SKULL:  No acute abnormality of the visualized skull or soft tissues.     No acute intracranial abnormality.     Assessment:  María Macias is a 78 y.o. year old female who presented on 5/29/2024 and is being treated for:  Principal Problem:    Acute CVA (cerebrovascular accident) ([facility])  Active Problems:    NSTEMI (non-ST elevated myocardial infarction) ([facility])    Altered mental status    CVA (cerebrovascular accident due to intracerebral hemorrhage) ([facility])  Resolved Problems:    * No resolved hospital problems. *    Plan  DNR CC/comfort measures  Morphine prn - will continue to titrate to comfort  Discussed with and updated daughter at bedside  Plan for home with hospice tomorrow    More than 50% of my time was spent at the bedside counseling/coordinating care with the patient and/or family with face to face contact.  This time was spent reviewing notes and laboratory data as well as instructing and counseling the patient. Time I spent with the family or surrogate(s) is included only if the patient was incapable of providing the necessary information or participating in medical decisions. I also discussed the differential diagnosis and all of the proposed management plans with the patient and individuals accompanying the patient. I am readily available for any further decision-making and intervention.     Carlos Jarquin MD  8:31 PM  5/30/2024

## 2024-05-31 NOTE — DISCHARGE INSTRUCTIONS
Your information:  Name: María Macias  : 1945    Your instructions:  Discharge home with hospice    What to do after you leave the hospital:    Recommended activity: activity as tolerated    The following personal items were collected during your admission and were returned to you:    Belongings  Dental Appliances: Partials  Vision - Corrective Lenses: None  Hearing Aid: None  Clothing: At home  Jewelry: None  Body Piercings Removed: N/A  Electronic Devices: None  Weapons (Notify Protective Services/Security): None  Other Valuables: At home  Home Medications: None  Valuables Given To: Family (Comment)  Provide Name(s) of Who Valuable(s) Were Given To: dtr    Information obtained by:  By signing below, I understand that if any problems occur once I leave the hospital I am to contact Dr. Jarquin.  I understand and acknowledge receipt of the instructions indicated above.

## 2024-05-31 NOTE — PLAN OF CARE
Problem: Safety - Adult  Goal: Free from fall injury  5/31/2024 0229 by Lauren Uribe RN  Outcome: Not Progressing  5/30/2024 1334 by Tarah Estrada RN  Outcome: Progressing     Problem: Discharge Planning  Goal: Discharge to home or other facility with appropriate resources  5/31/2024 0229 by Lauren Uribe RN  Outcome: Not Progressing  5/30/2024 1334 by Tarah Estrada RN  Outcome: Progressing     Problem: ABCDS Injury Assessment  Goal: Absence of physical injury  5/31/2024 0229 by Lauren Uribe RN  Outcome: Not Progressing  5/30/2024 1334 by Tarah Estrada RN  Outcome: Progressing     Problem: Skin/Tissue Integrity  Goal: Absence of new skin breakdown  Description: 1.  Monitor for areas of redness and/or skin breakdown  2.  Assess vascular access sites hourly  3.  Every 4-6 hours minimum:  Change oxygen saturation probe site  4.  Every 4-6 hours:  If on nasal continuous positive airway pressure, respiratory therapy assess nares and determine need for appliance change or resting period.  5/31/2024 0229 by Lauren Uribe RN  Outcome: Not Progressing  5/30/2024 1334 by Tarah Estrada RN  Outcome: Progressing     Problem: Pain  Goal: Verbalizes/displays adequate comfort level or baseline comfort level  5/31/2024 0229 by Lauren Uribe RN  Outcome: Not Progressing  5/30/2024 1334 by Tarah Estrada RN  Outcome: Progressing     Problem: Chronic Conditions and Co-morbidities  Goal: Patient's chronic conditions and co-morbidity symptoms are monitored and maintained or improved  5/31/2024 0229 by Lauren Uribe RN  Outcome: Not Progressing  5/30/2024 1334 by Tarah Estrada RN  Outcome: Progressing     Problem: Dyspnea Due to End of Life  Goal: Demonstrate understanding of and ability to manage respiratory symptoms at end of life  Description: Patient  and or family/caregiver will verbalize recall of breathing strategies to maintain an effective breathing pattern during the

## 2024-05-31 NOTE — DISCHARGE SUMMARY
Multiplanar reformatted images are provided for review.  MIP images are provided for review. CTA of the neck was performed with the administration of intravenous contrast. Multiplanar reformatted images are provided for review.  MIP images are provided for review. Stenosis of the internal carotid arteries measured using NASCET criteria. Automated exposure control, iterative reconstruction, and/or weight based adjustment of the mA/kV was utilized to reduce the radiation dose to as low as reasonably achievable. CT perfusion calculations are performed on a separate workstation. COMPARISON: CT of the head from earlier today at 5:41 a.m.. HISTORY: ORDERING SYSTEM PROVIDED HISTORY: CVA TECHNOLOGIST PROVIDED HISTORY: Reason for exam:->CVA Change in mental status.  Previously found down. Has a \"code stroke\" or \"stroke alert\" been called?->No; FINDINGS: CT HEAD: BRAIN/VENTRICLES:  There is new hypodensity and loss of gray-white junction throughout the majority of the left cerebral hemisphere, sparing the occipital and inferior posterior parietal regions.  Represents extensive acute nonhemorrhagic infarction throughout the left ZAID and MCA territory, sparing the inferior division of the left MCA and the left PCA. There is high-density thrombus in the supraclinoid left ICA, extending through the M1 and A1 segments of the left MCA and ZAID, respectively. There is increased swelling of the left cerebral hemisphere with moderate effacement of the left lateral ventricle, but without midline shift.  The basal cisterns are widely patent without descending transtentorial herniation, but there is minimal shift of the midbrain towards the right. There continues to be prominent cerebral atrophy in the right cerebral hemisphere.  No sign of any acute infarction is evidence in the right cerebral hemisphere. ORBITS: The visualized portion of the orbits demonstrate no acute abnormality. SINUSES:  The visualized paranasal sinuses and mastoid  mental status.  Previously found down. Has a \"code stroke\" or \"stroke alert\" been called?->No; FINDINGS: CT HEAD: BRAIN/VENTRICLES:  There is new hypodensity and loss of gray-white junction throughout the majority of the left cerebral hemisphere, sparing the occipital and inferior posterior parietal regions.  Represents extensive acute nonhemorrhagic infarction throughout the left ZAID and MCA territory, sparing the inferior division of the left MCA and the left PCA. There is high-density thrombus in the supraclinoid left ICA, extending through the M1 and A1 segments of the left MCA and ZAID, respectively. There is increased swelling of the left cerebral hemisphere with moderate effacement of the left lateral ventricle, but without midline shift.  The basal cisterns are widely patent without descending transtentorial herniation, but there is minimal shift of the midbrain towards the right. There continues to be prominent cerebral atrophy in the right cerebral hemisphere.  No sign of any acute infarction is evidence in the right cerebral hemisphere. ORBITS: The visualized portion of the orbits demonstrate no acute abnormality. SINUSES:  The visualized paranasal sinuses and mastoid air cells demonstrate no acute abnormality. SOFT TISSUES/SKULL: No acute abnormality of the visualized skull or soft tissues. CTA NECK: AORTIC ARCH/ARCH VESSELS: No dissection or arterial injury.  No significant stenosis of the brachiocephalic or subclavian arteries. CAROTID ARTERIES: Right carotid: 30% stenosis of the origin of the right internal carotid artery from calcified and noncalcified plaque using NASCET criteria.  The rest of the right carotid system is widely patent. Left carotid: Heavy calcified plaque of the origin of the left internal carotid artery without stenosis using NASCET criteria. There is decreased enhancement of the normal caliber cervical internal carotid artery from decreased flow.  This is seen to be associated with

## 2024-05-31 NOTE — PLAN OF CARE
Please note that this is a late entry for service offered on 5/29/2024.    Briefly, I spoke with the emergency department team at [facility] around 2 to 2:15 PM on 5/29/2024.  I personally reviewed the CT head, CT perfusion and CTA head and neck images.  CT head showed significant hypodensity in the left MCA and left ZAID vasculature with diffuse sulcal effacement and cerebral edema. There was barely any evidence of salvageable ischemic penumbra in the left hemisphere.  Vascular imaging showed tapering beyond the left ICA grazyna-cavernous segment.  More than likely, the patient had a left ICA terminus occlusion with clot burden extending into the left ZAID and the left MCA.    Exclusively from a neurovascular standpoint, there was no role of any endovascular intervention at that time.  Target systolic blood pressure less than 160 mmHg.  Hold off on any blood thinners.  I was informed by the emergency department team that there was consideration for heart cath because of concern for NSTEMI.  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    Yannick Hendricks M.D.  Vascular Neurology & Neurointerventional Surgery

## 2024-06-02 LAB
MICROORGANISM SPEC CULT: NORMAL
SERVICE CMNT-IMP: NORMAL
SPECIMEN DESCRIPTION: NORMAL

## 2024-06-03 LAB
MICROORGANISM SPEC CULT: NORMAL
SERVICE CMNT-IMP: NORMAL
SPECIMEN DESCRIPTION: NORMAL

## (undated) PROCEDURE — 027034Z DILATION OF CORONARY ARTERY, ONE ARTERY WITH DRUG-ELUTING INTRALUMINAL DEVICE, PERCUTANEOUS APPROACH: ICD-10-PCS

## (undated) PROCEDURE — 4A023N8 MEASUREMENT OF CARDIAC SAMPLING AND PRESSURE, BILATERAL, PERCUTANEOUS APPROACH: ICD-10-PCS

## (undated) DEVICE — INTRODUCER SHTH THN WALLED 6 FRX10 CM 22 GA ANGLED RAIN SHTH

## (undated) DEVICE — GUIDEWIRE VASC L260CM DIA0.035IN PTFE MOD S STL COIL PLAT

## (undated) DEVICE — PAD, DEFIB, ADULT, RADIOTRAN, PHYSIO, LO: Brand: MEDLINE

## (undated) DEVICE — CATHETER BLLN SPRINTER OTW 138CM 12MM DIA2.5MM CROSSING PROF

## (undated) DEVICE — RADIFOCUS OPTITORQUE ANGIOGRAPHIC CATHETER: Brand: OPTITORQUE

## (undated) DEVICE — TUBING PRSS MON L12IN PVC RIG NONEXPANDING M TO FEM CONN

## (undated) DEVICE — ANGIOGRAPHIC CATHETER: Brand: EXPO™

## (undated) DEVICE — DEVICE TORQ FLRESCNT PNK FOR HEMSTAS VLV

## (undated) DEVICE — INFLATION DEVICE KIT: Brand: ENCORE™ 26 ADVANTAGE KIT

## (undated) DEVICE — ANGIOPLASTY ADD-ON PACK: Brand: MEDLINE INDUSTRIES, INC.

## (undated) DEVICE — KIT ANGIO W/ AT P65 PREM HND CTRL FOR CNTRST DEL ANGIOTOUCH

## (undated) DEVICE — HEMOSTASIS VALVE PHD SM BORE WITH SPRING

## (undated) DEVICE — CANNULA NSL CANN NSL L25FT TBNG AD O2 SUP SFT UC

## (undated) DEVICE — KIT MFLD ISOLATN NACL CNTRST PRT TBNG SPIK W/ PRSS TRNSDUC

## (undated) DEVICE — BAND COMPR L24CM REG CLR PLAS HEMSTAT EXT HK AND LOOP RETEN

## (undated) DEVICE — GUIDEWIRE WITH ICE™ HYDROPHILIC COATING: Brand: LUGE™

## (undated) DEVICE — CATHETER GUID 6FR L100CM DIA0.071IN NYL SHFT EBU3.5

## (undated) DEVICE — PACK SURG CARDIAC CATH